# Patient Record
Sex: FEMALE | Race: WHITE | NOT HISPANIC OR LATINO | Employment: OTHER | URBAN - METROPOLITAN AREA
[De-identification: names, ages, dates, MRNs, and addresses within clinical notes are randomized per-mention and may not be internally consistent; named-entity substitution may affect disease eponyms.]

---

## 2017-02-20 PROBLEM — M76.829 POSTERIOR TIBIAL TENDON DYSFUNCTION: Status: ACTIVE | Noted: 2017-02-20

## 2019-08-27 ENCOUNTER — TELEPHONE (OUTPATIENT)
Dept: RHEUMATOLOGY | Facility: CLINIC | Age: 59
End: 2019-08-27

## 2019-08-27 NOTE — TELEPHONE ENCOUNTER
----- Message from Kavin Monson sent at 8/27/2019  2:09 PM CDT -----  Type: Needs Medical Advice    Who Called: Dr Genevieve Haywood office- [place]   Symptoms (please be specific):    How long has patient had these symptoms:    Pharmacy name and phone #:    Best Call Back Number: [phone] Additional Information: The office called asking if the doctor's office received the referral for the pt.

## 2019-11-14 ENCOUNTER — HOSPITAL ENCOUNTER (OUTPATIENT)
Dept: RADIOLOGY | Facility: HOSPITAL | Age: 59
Discharge: HOME | End: 2019-11-14
Attending: INTERNAL MEDICINE
Payer: MEDICAID

## 2019-11-14 ENCOUNTER — INITIAL CONSULT (OUTPATIENT)
Dept: RHEUMATOLOGY | Facility: CLINIC | Age: 59
End: 2019-11-14
Payer: MEDICAID

## 2019-11-14 VITALS
BODY MASS INDEX: 37.11 KG/M2 | SYSTOLIC BLOOD PRESSURE: 130 MMHG | HEART RATE: 71 BPM | WEIGHT: 209.44 LBS | DIASTOLIC BLOOD PRESSURE: 72 MMHG | HEIGHT: 63 IN

## 2019-11-14 DIAGNOSIS — R76.8 ANA POSITIVE: ICD-10-CM

## 2019-11-14 DIAGNOSIS — M17.0 PRIMARY OSTEOARTHRITIS OF BOTH KNEES: ICD-10-CM

## 2019-11-14 DIAGNOSIS — R76.8 ANA POSITIVE: Primary | ICD-10-CM

## 2019-11-14 DIAGNOSIS — M25.532 PAIN IN BOTH WRISTS: ICD-10-CM

## 2019-11-14 DIAGNOSIS — L65.9 ALOPECIA: ICD-10-CM

## 2019-11-14 DIAGNOSIS — M25.531 PAIN IN BOTH WRISTS: ICD-10-CM

## 2019-11-14 PROCEDURE — 99999 PR PBB SHADOW E&M-EST. PATIENT-LVL III: CPT | Mod: PBBFAC,,, | Performed by: INTERNAL MEDICINE

## 2019-11-14 PROCEDURE — 77077 JOINT SURVEY SINGLE VIEW: CPT | Mod: 26,,, | Performed by: RADIOLOGY

## 2019-11-14 PROCEDURE — 99205 OFFICE O/P NEW HI 60 MIN: CPT | Mod: 25,S$PBB,, | Performed by: INTERNAL MEDICINE

## 2019-11-14 PROCEDURE — 77077 XR ARTHRITIS SURVEY: ICD-10-PCS | Mod: 26,,, | Performed by: RADIOLOGY

## 2019-11-14 PROCEDURE — 99213 OFFICE O/P EST LOW 20 MIN: CPT | Mod: PBBFAC,25,PO | Performed by: INTERNAL MEDICINE

## 2019-11-14 PROCEDURE — 99999 PR PBB SHADOW E&M-EST. PATIENT-LVL III: ICD-10-PCS | Mod: PBBFAC,,, | Performed by: INTERNAL MEDICINE

## 2019-11-14 PROCEDURE — 96372 THER/PROPH/DIAG INJ SC/IM: CPT | Mod: PBBFAC,PO

## 2019-11-14 PROCEDURE — 99205 PR OFFICE/OUTPT VISIT, NEW, LEVL V, 60-74 MIN: ICD-10-PCS | Mod: 25,S$PBB,, | Performed by: INTERNAL MEDICINE

## 2019-11-14 PROCEDURE — 77077 JOINT SURVEY SINGLE VIEW: CPT | Mod: TC,PO

## 2019-11-14 RX ORDER — BUPROPION HYDROCHLORIDE 150 MG/1
TABLET, EXTENDED RELEASE ORAL
Refills: 1 | COMMUNITY
Start: 2019-10-17 | End: 2020-01-27

## 2019-11-14 RX ORDER — BLOOD SUGAR DIAGNOSTIC
STRIP MISCELLANEOUS
Refills: 2 | COMMUNITY
Start: 2019-10-25 | End: 2020-05-07

## 2019-11-14 RX ORDER — CYANOCOBALAMIN 1000 UG/ML
1000 INJECTION, SOLUTION INTRAMUSCULAR; SUBCUTANEOUS
Status: COMPLETED | OUTPATIENT
Start: 2019-11-14 | End: 2019-11-14

## 2019-11-14 RX ORDER — MEDICAL SUPPLY, MISCELLANEOUS
MISCELLANEOUS MISCELLANEOUS
COMMUNITY
Start: 2017-05-10

## 2019-11-14 RX ORDER — CELECOXIB 200 MG/1
200 CAPSULE ORAL DAILY
Qty: 30 CAPSULE | Refills: 3 | Status: SHIPPED | OUTPATIENT
Start: 2019-11-14 | End: 2019-12-14

## 2019-11-14 RX ORDER — FLUTICASONE PROPIONATE 50 MCG
2 SPRAY, SUSPENSION (ML) NASAL DAILY
Refills: 2 | COMMUNITY
Start: 2019-08-21

## 2019-11-14 RX ORDER — KETOROLAC TROMETHAMINE 30 MG/ML
60 INJECTION, SOLUTION INTRAMUSCULAR; INTRAVENOUS
Status: COMPLETED | OUTPATIENT
Start: 2019-11-14 | End: 2019-11-14

## 2019-11-14 RX ADMIN — KETOROLAC TROMETHAMINE 60 MG: 30 INJECTION, SOLUTION INTRAMUSCULAR at 11:11

## 2019-11-14 RX ADMIN — CYANOCOBALAMIN 1000 MCG: 1000 INJECTION, SOLUTION INTRAMUSCULAR at 11:11

## 2019-11-14 ASSESSMENT — ROUTINE ASSESSMENT OF PATIENT INDEX DATA (RAPID3)
FATIGUE SCORE: 1.1
PSYCHOLOGICAL DISTRESS SCORE: 0
PAIN SCORE: 6
TOTAL RAPID3 SCORE: 4.89
MDHAQ FUNCTION SCORE: 1.1
PATIENT GLOBAL ASSESSMENT SCORE: 5

## 2019-11-14 NOTE — LETTER
November 24, 2019      ERICK Peralta  [address] [place] [address]           [facility] [address] [place] [address]  Phone: [phone]  Fax: [phone]          Patient: Patricia Fournier   MR Number: [id number]   YOB: 1960   Date of Visit: 11/14/2019       Dear Genevieve Haywood:    Thank you for referring Patricia Fournier to me for evaluation. Attached you will find relevant portions of my assessment and plan of care.    If you have questions, please do not hesitate to call me. I look forward to following Patricia Fournier along with you.    Sincerely,    Murali Wilson MD    Enclosure  CC:  No Recipients    If you would like to receive this communication electronically, please contact [email or web address] or [phone] to request more information on [email or web address] access.    For providers and/or their staff who would like to refer a patient to [facility], please contact us through our one-stop-shop provider referral line, [facility] , at [phone].    If you feel you have received this communication in error or would no longer like to receive these types of communications, please e-mail [email or web address]

## 2019-11-14 NOTE — PROGRESS NOTES
Administered 1 cc ( 1000 mcg/ml ) of b12 to the right upper outer gluteal. Informed of s/s to report verbalized understanding. No adverse reactions noted.    Lot # [id number]  Expiration feb 21    Administered 2 cc ( 30 mg/ml ) of toradol to the left upper outer gluteal. Informed of s/s to report verbalized understanding. No adverse reactions noted.    Lot # [id number]  Expiration 07/2021

## 2019-11-14 NOTE — PROGRESS NOTES
Subjective:       Patient ID: Patricia Fournier is a 59 y.o. female.    Chief Complaint: Positive ROHIT     HPI:  60 yo female with a h/o positive rohit joint pain B ankles and  B wrist R> L,   She has neck and lower back pain, pt is  Type 2 DM poorly controlled.  She hair loss am gelling 60 min. Patient complains of arthralgias and myalgias for which has been present for a few years. Pain is located in multiple joints, both shoulder(s), both elbow(s), both wrist(s), both MCP(s): 1st, 2nd, 3rd, 4th and 5th, both PIP(s): 1st, 2nd, 3rd, 4th and 5th, both DIP(s): 1st and 2nd, both hip(s), both knee(s) and both MTP(s): 1st, 2nd, 3rd, 4th and 5th, is described as aching, pulsating, shooting and throbbing, and is constant, moderate .  Associated symptoms include: crepitation, decreased range of motion, edema, effusion, tenderness and warmth.  No family h/o RA or oa longevity in family.            She complains of pain, stiffness, joint swelling and joint warmth. Affected locations include the neck, right wrist, left wrist and left ankle. Associated symptoms include fatigue, pain at night, pain while resting, myalgias and dry eyes. Pertinent negatives include no dysuria, fever, trouble swallowing, pleurisy, headaches, stress or weight loss. She complains of morning stiffness lasting between 30 and 60 minutes after awakening.The right wrist, left wrist, left hand, right hand, left ankle and right ankle presents with Arthralgia.    Past treatments include NSAIDs. Factors aggravating her arthritis include activity.     Review of Systems   Constitutional: Positive for fatigue. Negative for activity change, appetite change, chills, diaphoresis, fever, unexpected weight change and weight loss.   HENT: Negative for congestion, dental problem, ear discharge, ear pain, facial swelling, mouth sores, nosebleeds, postnasal drip, rhinorrhea, sinus pressure, sneezing, sore throat, tinnitus, trouble swallowing and voice change.    Eyes: Negative  "for photophobia, pain, discharge, redness and itching.   Respiratory: Negative for apnea, cough, chest tightness, shortness of breath and wheezing.    Cardiovascular: Positive for leg swelling. Negative for chest pain and palpitations.   Gastrointestinal: Negative for abdominal distention, abdominal pain, constipation, diarrhea, nausea and vomiting.   Endocrine: Negative for cold intolerance, heat intolerance, polydipsia and polyuria.   Genitourinary: Negative for decreased urine volume, difficulty urinating, dysuria, flank pain, frequency, hematuria and urgency.   Musculoskeletal: Positive for arthralgias, back pain, gait problem, joint swelling, myalgias, neck pain, neck stiffness and stiffness.   Skin: Negative for pallor, rash and wound.   Allergic/Immunologic: Negative for immunocompromised state.   Neurological: Negative for dizziness, tremors, weakness, numbness and headaches.   Hematological: Negative for adenopathy. Does not bruise/bleed easily.   Psychiatric/Behavioral: Negative for sleep disturbance. The patient is not nervous/anxious.          Objective:   /72 (BP Location: Left arm, Patient Position: Sitting, BP Method: Medium (Automatic))   Pulse 71   Ht 5' 3" (1.6 m)   Wt 95 kg (209 lb 7 oz)   BMI 37.10 kg/m²      Physical Exam   Vitals reviewed.  Constitutional: She is oriented to person, place, and time. No distress.   HENT:   Head: Normocephalic and atraumatic.   Eyes: EOM are normal. Pupils are equal, round, and reactive to light. Right eye exhibits no discharge. Left eye exhibits no discharge.   Neck: Neck supple. No thyromegaly present.   Cardiovascular: Normal rate, regular rhythm and normal heart sounds.  Exam reveals no gallop and no friction rub.    No murmur heard.  Pulmonary/Chest: Breath sounds normal. She has no wheezes. She has no rales. She exhibits no tenderness.   Abdominal: There is no tenderness. There is no rebound and no guarding.       Right Side Rheumatological Exam "     Examination finds the elbow normal.    The patient is tender to palpation of the shoulder, wrist, knee, 1st PIP, 1st MCP, 2nd PIP, 2nd MCP, 3rd PIP, 3rd MCP, 4th PIP, 4th MCP, 5th PIP and 5th MCP    She has swelling of the 1st PIP, 1st MCP, 2nd PIP, 2nd MCP, 3rd PIP, 3rd MCP, 4th PIP, 4th MCP, 5th PIP and 5th MCP    Shoulder Exam   Tenderness Location: no tenderness    Range of Motion   Active abduction: abnormal   Adduction: abnormal  Sensation: normal    Knee Exam   Patellofemoral Crepitus: positive  Effusion: positive  Sensation: normal    Hip Exam   Tenderness Location: posterior  Sensation: normal    Elbow/Wrist Exam   Tenderness Location: no tenderness  Sensation: normal    Left Side Rheumatological Exam     The patient is tender to palpation of the shoulder, elbow, wrist, knee, 1st PIP, 1st MCP, 2nd PIP, 2nd MCP, 3rd PIP, 3rd MCP, 4th PIP, 4th MCP, 5th PIP and 5th MCP.    She has swelling of the 1st PIP, 1st MCP, 2nd PIP, 2nd MCP, 3rd PIP, 3rd MCP, 4th PIP, 4th MCP, 5th PIP and 5th MCP    Shoulder Exam   Tenderness Location: no tenderness    Range of Motion   Active abduction: abnormal   Sensation: normal    Knee Exam     Patellofemoral Crepitus: positive  Effusion: positive  Sensation: normal    Hip Exam   Tenderness Location: posterior  Sensation: normal    Elbow/Wrist Exam   Sensation: normal      Back/Neck Exam   General Inspection   Gait: normal         Lymphadenopathy:     She has no cervical adenopathy.   Neurological: She is alert and oriented to person, place, and time. Gait normal.   Skin: Skin is dry. No rash noted. No erythema. There is pallor.     Psychiatric: Mood and affect normal.   Musculoskeletal: She exhibits tenderness and deformity.        HEMOGLOBIN A1C (09/26/2019 1:16 PM CDT)  HEMOGLOBIN A1C (09/26/2019 1:16 PM CDT)   Component Value Ref Range Performed At Pathologist Signature   HEMOGLOBIN A1C 9.0 (H) 4.7 - 5.6 % [facility]     EST. AVG GLUCOSE, A1C 212 (H) <=115  mg/dL [facility]       HEMOGLOBIN A1C (09/26/2019 1:16 PM CDT)   Specimen   Blood     HEMOGLOBIN A1C (09/26/2019 1:16 PM CDT)   Narrative Performed At   HGB A1C INTERPRETATION    NORMAL:       <5.7%    PRE-DIABETES: 5.7 - 6.4%    DIABETES:     6.5% OR GREATER        Reference Ranges HgbA1C    4.7%-5.6% Normal     5.7%-6.4% Increased Risk for Diabetes    >6.4% (Confirmed) Diagnostic of Diabetes    <7.0%  Adult Glycemic Control Target    [facility]       HEMOGLOBIN A1C (09/26/2019 1:16 PM CDT)   Performing Organization Address City/[facility]/Zipcode Phone Number   [facility]   CLIA #[id number], [address] [place] [address]      Back to top of Lab Results       C-PEPTIDE (09/26/2019 1:16 PM CDT)  C-PEPTIDE (09/26/2019 1:16 PM CDT)   Component Value Ref Range Performed At Pathologist Signature   C-PEPTIDE 4.3  Comment:   Test Performed by:  [facility]  [address] [place] [address]  : Elvis Morris M.D. Ph.D.; CLIA# [id number] 1.1 - 4.4 ng/mL [facility]       C-PEPTIDE (09/26/2019 1:16 PM CDT)   Specimen   Blood     C-PEPTIDE (09/26/2019 1:16 PM CDT)   Performing Organization Address City/[facility] Phone Number   [facility]        [phone]     [facility]             VITAMIN D 25 HYDROXY (08/21/2019 10:35 AM CDT)  VITAMIN D 25 HYDROXY (08/21/2019 10:35 AM CDT)   Component Value Ref Range Performed At Pathologist Signature   VITAMIN D TOTAL (25OH) 30 30 - 100 ng/mL [facility]       VITAMIN D 25 HYDROXY (08/21/2019 10:35 AM CDT)   Specimen   Blood     VITAMIN D 25 HYDROXY (08/21/2019 10:35 AM CDT)   Narrative Performed At   Interpretive Data Chart:        Deficient:                         0 - 20 ng/mL    Insufficient:                      21 - 29 ng/mL    Sufficient:                        30 - 100 ng/mL     Increased Risk of Hypercalciuria:  >100 ng/ml    Toxic:                             >150 ng/ml                                       [facility]       VITAMIN D 25 HYDROXY (08/21/2019 10:35 AM CDT)   Performing Organization Address City/State/Zipcode Phone Number   [facility]   CLIA #[id number], [address] [place] [address]      Back to top of Lab Results       CONNECTIVE TISSUE DISEASE CASCADE (08/21/2019 10:35 AM CDT)  CONNECTIVE TISSUE DISEASE CASCADE (08/21/2019 10:35 AM CDT)   Component Value Ref Range Performed At Pathologist Signature   GRACIELA 0.5 <=1.0 (Negative) U [facility]     CYCLIC CITRULLINATED PEPTIDE AB IGG <15.6 <20.0 (Negative) U [facility]     CONNECTIVE TISSUE DISEASE INTERP SEE COMMENTS  Comment:   Tests for antibodies to dsDNA and ANAIS antigens are not  performed automatically unless the GRACIELA result is > or =  3.0 U.  Studies performed at [facility] indicate that   positive GRACIELA results <3.0 U are rarely accompanied by   positive second order tests.  Test Performed by:  [facility]  [address]       CONNECTIVE TISSUE DISEASE CASCADE (08/21/2019 10:35 AM CDT)   Specimen   Blood     CONNECTIVE TISSUE DISEASE CASCADE (08/21/2019 10:35 AM CDT)   Performing Organization Address City/State/Zipcode Phone Number   [facility]        [phone]     [facility]             Back to top of Lab Results       SEDIMENTATION RATE (08/21/2019 10:35 AM CDT)  SEDIMENTATION RATE (08/21/2019 10:35 AM CDT)   Component Value Ref Range Performed At Pathologist Signature   ESR (SEDIMENTATION RATE) 26 <40 mm/Hr [facility]       SEDIMENTATION RATE (08/21/2019 10:35 AM CDT)   Specimen   Blood     SEDIMENTATION RATE (08/21/2019 10:35 AM CDT)   Performing Organization Address  City/State/Zipcode Phone Number   [facility]   CLIA #[id number], [address] [place] [address]      Back to top of Lab Results       RHEUMATOID FACTOR (08/21/2019 10:35 AM CDT)  RHEUMATOID FACTOR (08/21/2019 10:35 AM CDT)   Component Value Ref Range Performed At Pathologist Signature   RHEUMATOID FACTOR <15  Comment:   Perform at [facility]  [address] [facility] [place] [address]  [phone] - 30 IU/mL [facility]       RHEUMATOID FACTOR (08/21/2019 10:35 AM CDT)   Specimen   Blood     RHEUMATOID FACTOR (08/21/2019 10:35 AM CDT)   Performing Organization Address City/[facility] Phone [facility]   [facility]             Back to top of Lab Results       ROHIT SCREEN W/REFLEX (08/21/2019 10:35 AM CDT)  ROHIT SCREEN W/REFLEX (08/21/2019 10:35 AM CDT)   Component Value Ref Range Performed At Pathologist Signature   ROHIT Positive 1:320 (A) <1:80 (Negative) [facility]     ROHIT TITER 1:320   [facility]     ROHIT PATTERN Homogeneous  Comment:   Test Performed by:  [facility]  [address]       ROHIT SCREEN W/REFLEX (08/21/2019 10:35 AM CDT)   Specimen   Blood     ROHIT SCREEN W/REFLEX (08/21/2019 10:35 AM CDT)   Performing Organization Address City/[facility] Phone Number   [facility]        [phone]     [facility]             Back to top of Lab Results  Back to top of Lab Results     reviewed labs with patient during this visit   Assessment:       1. ROHIT positive    2. Alopecia    3. Primary osteoarthritis of both knees    4. Pain in both wrists            Plan:       Patricia was seen today for positive rohit.    Diagnoses and all orders for this visit:    ROHIT positive  -     ROHIT Screen w/Reflex; Future  -     Anti Sm/RNP Antibody; Future  -     Anti-DNA antibody,  double-stranded; Future  -     Anti-Histone Antibody; Future  -     Antimitochondrial antibody; Future  -     Anti-scleroderma antibody; Future  -     Anti-Smith antibody; Future  -     Anti-smooth muscle antibody; Future  -     Anti-thyroglobulin antibody; Future  -     CBC auto differential; Future  -     Comprehensive metabolic panel; Future  -     C-reactive protein; Future  -     Sjogrens syndrome-B extractable nuclear antibody; Future  -     Sjogrens syndrome-A extractable nuclear antibody; Future  -     Sedimentation rate; Future  -     Cyclic citrul peptide antibody, IgG; Future  -     PTH, intact; Future  -     TSH; Future  -     Thyroid peroxidase antibody; Future  -     T4, free; Future  -     T3, free; Future  -     Hemoglobin A1c; Future  -     Insulin, random; Future  -     celecoxib (CELEBREX) 200 MG capsule; Take 1 capsule (200 mg total) by mouth once daily.  -     Ambulatory referral/consult to Endocrinology; Future  -     XR Arthritis Survey; Future  -     ketorolac injection 60 mg    Alopecia  -     GRACIELA Screen w/Reflex; Future  -     Anti Sm/RNP Antibody; Future  -     Anti-DNA antibody, double-stranded; Future  -     Anti-Histone Antibody; Future  -     Antimitochondrial antibody; Future  -     Anti-scleroderma antibody; Future  -     Anti-Smith antibody; Future  -     Anti-smooth muscle antibody; Future  -     Anti-thyroglobulin antibody; Future  -     CBC auto differential; Future  -     Comprehensive metabolic panel; Future  -     C-reactive protein; Future  -     Sjogrens syndrome-B extractable nuclear antibody; Future  -     Sjogrens syndrome-A extractable nuclear antibody; Future  -     Sedimentation rate; Future  -     Cyclic citrul peptide antibody, IgG; Future  -     PTH, intact; Future  -     TSH; Future  -     Thyroid peroxidase antibody; Future  -     T4, free; Future  -     T3, free; Future  -     Hemoglobin A1c; Future  -     Insulin, random; Future  -     celecoxib (CELEBREX) 200  MG capsule; Take 1 capsule (200 mg total) by mouth once daily.  -     Ambulatory referral/consult to Endocrinology; Future  -     XR Arthritis Survey; Future  -     ketorolac injection 60 mg    Primary osteoarthritis of both knees  -     GRACIELA Screen w/Reflex; Future  -     Anti Sm/RNP Antibody; Future  -     Anti-DNA antibody, double-stranded; Future  -     Anti-Histone Antibody; Future  -     Antimitochondrial antibody; Future  -     Anti-scleroderma antibody; Future  -     Anti-Smith antibody; Future  -     Anti-smooth muscle antibody; Future  -     Anti-thyroglobulin antibody; Future  -     CBC auto differential; Future  -     Comprehensive metabolic panel; Future  -     C-reactive protein; Future  -     Sjogrens syndrome-B extractable nuclear antibody; Future  -     Sjogrens syndrome-A extractable nuclear antibody; Future  -     Sedimentation rate; Future  -     Cyclic citrul peptide antibody, IgG; Future  -     PTH, intact; Future  -     TSH; Future  -     Thyroid peroxidase antibody; Future  -     T4, free; Future  -     T3, free; Future  -     Hemoglobin A1c; Future  -     Insulin, random; Future  -     celecoxib (CELEBREX) 200 MG capsule; Take 1 capsule (200 mg total) by mouth once daily.  -     Ambulatory referral/consult to Endocrinology; Future  -     XR Arthritis Survey; Future  -     ketorolac injection 60 mg    Pain in both wrists  -     XR Arthritis Survey; Future    Other orders  -     cyanocobalamin injection 1,000 mcg    will discuss with opthof plaquenil is an option

## 2019-12-06 ENCOUNTER — TELEPHONE (OUTPATIENT)
Dept: RHEUMATOLOGY | Facility: CLINIC | Age: 59
End: 2019-12-06

## 2019-12-06 NOTE — TELEPHONE ENCOUNTER
----- Message from Gale Louise sent at 12/6/2019 10:36 AM CST -----  Contact: self  Type:  Test Results    Who Called:  self  Name of Test (Lab/Mammo/Etc):  Labs and Xrays  Date of Test:  11/14/19  Ordering Provider:  Dr Wilson  Where the test was performed:  [facility]  Best Call Back Number:  [phone] (work)  Additional Information:  Patient is still waiting on the results. Please call patient. Thanks!

## 2020-01-27 ENCOUNTER — OFFICE VISIT (OUTPATIENT)
Dept: RHEUMATOLOGY | Facility: CLINIC | Age: 60
End: 2020-01-27
Payer: MEDICAID

## 2020-01-27 VITALS
SYSTOLIC BLOOD PRESSURE: 137 MMHG | RESPIRATION RATE: 18 BRPM | DIASTOLIC BLOOD PRESSURE: 76 MMHG | WEIGHT: 219.25 LBS | HEART RATE: 77 BPM | BODY MASS INDEX: 38.85 KG/M2 | HEIGHT: 63 IN

## 2020-01-27 DIAGNOSIS — M35.00 SJOGREN'S SYNDROME, WITH UNSPECIFIED ORGAN INVOLVEMENT: ICD-10-CM

## 2020-01-27 DIAGNOSIS — M25.531 PAIN IN BOTH WRISTS: ICD-10-CM

## 2020-01-27 DIAGNOSIS — M25.532 PAIN IN BOTH WRISTS: ICD-10-CM

## 2020-01-27 DIAGNOSIS — M06.00 SERONEGATIVE RHEUMATOID ARTHRITIS: Primary | ICD-10-CM

## 2020-01-27 DIAGNOSIS — L65.9 ALOPECIA: ICD-10-CM

## 2020-01-27 DIAGNOSIS — R76.8 ANA POSITIVE: ICD-10-CM

## 2020-01-27 PROCEDURE — 99999 PR PBB SHADOW E&M-EST. PATIENT-LVL IV: ICD-10-PCS | Mod: PBBFAC,,, | Performed by: INTERNAL MEDICINE

## 2020-01-27 PROCEDURE — 99214 PR OFFICE/OUTPT VISIT, EST, LEVL IV, 30-39 MIN: ICD-10-PCS | Mod: S$PBB,,, | Performed by: INTERNAL MEDICINE

## 2020-01-27 PROCEDURE — 99999 PR PBB SHADOW E&M-EST. PATIENT-LVL IV: CPT | Mod: PBBFAC,,, | Performed by: INTERNAL MEDICINE

## 2020-01-27 PROCEDURE — 99214 OFFICE O/P EST MOD 30 MIN: CPT | Mod: S$PBB,,, | Performed by: INTERNAL MEDICINE

## 2020-01-27 PROCEDURE — 99214 OFFICE O/P EST MOD 30 MIN: CPT | Mod: PBBFAC,PO | Performed by: INTERNAL MEDICINE

## 2020-01-27 RX ORDER — HYDROXYCHLOROQUINE SULFATE 200 MG/1
200 TABLET, FILM COATED ORAL 2 TIMES DAILY
Qty: 60 TABLET | Refills: 6 | Status: SHIPPED | OUTPATIENT
Start: 2020-01-27 | End: 2020-02-26

## 2020-01-27 RX ORDER — CELECOXIB 200 MG/1
200 CAPSULE ORAL DAILY
Qty: 90 CAPSULE | Refills: 2 | Status: SHIPPED | OUTPATIENT
Start: 2020-01-27 | End: 2020-01-27 | Stop reason: SDUPTHER

## 2020-01-27 RX ORDER — CELECOXIB 200 MG/1
200 CAPSULE ORAL DAILY
Qty: 90 CAPSULE | Refills: 2 | Status: SHIPPED | OUTPATIENT
Start: 2020-01-27 | End: 2020-04-16

## 2020-01-27 ASSESSMENT — ROUTINE ASSESSMENT OF PATIENT INDEX DATA (RAPID3)
PAIN SCORE: 5
MDHAQ FUNCTION SCORE: .9
PSYCHOLOGICAL DISTRESS SCORE: 1.1
PATIENT GLOBAL ASSESSMENT SCORE: 8
TOTAL RAPID3 SCORE: 5.33

## 2020-01-27 NOTE — PROGRESS NOTES
Subjective:       Patient ID: Patricia Fournier is a 59 y.o. female.    Chief Complaint: Disease Management    Follow up: 60 yo female  rohit joint pain B ankles and  B wrist R> L,  She has neck and lower back pain, pt is  Type 2 DM poorly controlled she still has her pancrease is working..  She hair loss am gelling 60 min. Patient complains of arthralgias and myalgias for which has been present for a few years. Pain is located in multiple joints, both shoulder(s), both elbow(s), both wrist(s), both MCP(s): 1st, 2nd, 3rd, 4th and 5th, both PIP(s): 1st, 2nd, 3rd, 4th and 5th, both DIP(s): 1st and 2nd, both hip(s), both knee(s) and both MTP(s): 1st, 2nd, 3rd, 4th and 5th, is described as aching, pulsating, shooting and throbbing, and is constant, moderate .      Review of Systems   Constitutional: Negative for activity change, appetite change, chills, diaphoresis and unexpected weight change.   HENT: Negative for congestion, dental problem, ear discharge, ear pain, facial swelling, mouth sores, nosebleeds, postnasal drip, rhinorrhea, sinus pressure, sneezing, sore throat, tinnitus and voice change.    Eyes: Negative for photophobia, pain, discharge, redness and itching.   Respiratory: Negative for apnea, cough, chest tightness, shortness of breath and wheezing.    Cardiovascular: Positive for leg swelling. Negative for chest pain and palpitations.   Gastrointestinal: Negative for abdominal distention, abdominal pain, constipation, diarrhea, nausea and vomiting.   Endocrine: Negative for cold intolerance, heat intolerance, polydipsia and polyuria.   Genitourinary: Negative for decreased urine volume, difficulty urinating, flank pain, frequency, hematuria and urgency.   Musculoskeletal: Positive for arthralgias, back pain, gait problem, joint swelling, myalgias, neck pain and neck stiffness.   Skin: Negative for pallor, rash and wound.   Allergic/Immunologic: Negative for immunocompromised state.   Neurological: Negative for  "dizziness, tremors, weakness and numbness.   Hematological: Negative for adenopathy. Does not bruise/bleed easily.   Psychiatric/Behavioral: Negative for sleep disturbance. The patient is not nervous/anxious.          Objective:   /76   Pulse 77   Resp 18   Ht 5' 3" (1.6 m)   Wt 99.5 kg (219 lb 4 oz)   BMI 38.84 kg/m²      Physical Exam   Vitals reviewed.  Constitutional: She is oriented to person, place, and time. No distress.   HENT:   Head: Normocephalic and atraumatic.   Eyes: EOM are normal. Pupils are equal, round, and reactive to light. Right eye exhibits no discharge. Left eye exhibits no discharge.   Neck: Neck supple. No thyromegaly present.   Cardiovascular: Normal rate, regular rhythm and normal heart sounds.  Exam reveals no gallop and no friction rub.    No murmur heard.  Pulmonary/Chest: Breath sounds normal. She has no wheezes. She has no rales. She exhibits no tenderness.   Abdominal: There is no tenderness. There is no rebound and no guarding.       Right Side Rheumatological Exam     Examination finds the elbow normal.    The patient is tender to palpation of the shoulder, wrist, knee, 1st PIP, 1st MCP, 2nd PIP, 2nd MCP, 3rd PIP, 3rd MCP, 4th PIP, 4th MCP, 5th PIP and 5th MCP    She has swelling of the 1st PIP, 1st MCP, 2nd PIP, 2nd MCP, 3rd PIP, 3rd MCP, 4th PIP, 4th MCP, 5th PIP and 5th MCP    Shoulder Exam   Tenderness Location: no tenderness    Range of Motion   Active abduction: abnormal   Adduction: abnormal  Sensation: normal    Knee Exam   Patellofemoral Crepitus: positive  Effusion: positive  Sensation: normal    Hip Exam   Tenderness Location: posterior  Sensation: normal    Elbow/Wrist Exam   Tenderness Location: no tenderness  Sensation: normal    Left Side Rheumatological Exam     The patient is tender to palpation of the shoulder, elbow, wrist, knee, 1st PIP, 1st MCP, 2nd PIP, 2nd MCP, 3rd PIP, 3rd MCP, 4th PIP, 4th MCP, 5th PIP and 5th MCP.    She has swelling of the 1st " PIP, 1st MCP, 2nd PIP, 2nd MCP, 3rd PIP, 3rd MCP, 4th PIP, 4th MCP, 5th PIP and 5th MCP    Shoulder Exam   Tenderness Location: no tenderness    Range of Motion   Active abduction: abnormal   Sensation: normal    Knee Exam     Patellofemoral Crepitus: positive  Effusion: positive  Sensation: normal    Hip Exam   Tenderness Location: posterior  Sensation: normal    Elbow/Wrist Exam   Sensation: normal      Back/Neck Exam   General Inspection   Gait: normal         Lymphadenopathy:     She has no cervical adenopathy.   Neurological: She is alert and oriented to person, place, and time. Gait normal.   Skin: Skin is dry. No rash noted. No erythema. There is pallor.     Psychiatric: Mood and affect normal.   Musculoskeletal: She exhibits tenderness and deformity.        Results for orders placed or performed in visit on 11/14/19   GRACIELA Screen w/Reflex   Result Value Ref Range    GRACIELA Screen Positive (A) Negative <1:160   Anti Sm/RNP Antibody   Result Value Ref Range    Anti Sm/RNP Antibody 2.50 0.00 - 19.99 EU    Anti-Sm/RNP Interpretation Negative Negative   Anti-DNA antibody, double-stranded   Result Value Ref Range    ds DNA Ab Negative 1:10 Negative 1:10   Anti-Histone Antibody   Result Value Ref Range    Anti-Histone Antibody 0.4 0.0 - 0.9 Units   Antimitochondrial antibody   Result Value Ref Range    Anti-Mitochon Ab IFA Negative 1:40 Negative   Anti-scleroderma antibody   Result Value Ref Range    Scleroderma SCL- 5 <20 UNITS   Anti-Smith antibody   Result Value Ref Range    Anti Sm Antibody 2.19 0.00 - 19.99 EU    Anti-Sm Interpretation Negative Negative   Anti-smooth muscle antibody   Result Value Ref Range    Smooth Muscle Ab Negative 1:40 Negative   Anti-thyroglobulin antibody   Result Value Ref Range    Thyroglobulin Ab Screen <4.0 0.0 - 3.9 IU/mL   CBC auto differential   Result Value Ref Range    WBC 4.11 3.90 - 12.70 K/uL    RBC 4.19 4.00 - 5.40 M/uL    Hemoglobin 11.3 (L) 12.0 - 16.0 g/dL    Hematocrit 36.8  (L) 37.0 - 48.5 %    Mean Corpuscular Volume 88 82 - 98 fL    Mean Corpuscular Hemoglobin 27.0 27.0 - 31.0 pg    Mean Corpuscular Hemoglobin Conc 30.7 (L) 32.0 - 36.0 g/dL    RDW 13.8 11.5 - 14.5 %    Platelets 238 150 - 350 K/uL    MPV 11.2 9.2 - 12.9 fL    Immature Granulocytes 0.2 0.0 - 0.5 %    Gran # (ANC) 2.1 1.8 - 7.7 K/uL    Immature Grans (Abs) 0.01 0.00 - 0.04 K/uL    Lymph # 1.4 1.0 - 4.8 K/uL    Mono # 0.3 0.3 - 1.0 K/uL    Eos # 0.3 0.0 - 0.5 K/uL    Baso # 0.03 0.00 - 0.20 K/uL    nRBC 0 0 /100 WBC    Gran% 50.7 38.0 - 73.0 %    Lymph% 34.5 18.0 - 48.0 %    Mono% 7.1 4.0 - 15.0 %    Eosinophil% 6.8 0.0 - 8.0 %    Basophil% 0.7 0.0 - 1.9 %    Differential Method Automated    Comprehensive metabolic panel   Result Value Ref Range    Sodium 139 136 - 145 mmol/L    Potassium 4.4 3.5 - 5.1 mmol/L    Chloride 104 95 - 110 mmol/L    CO2 26 23 - 29 mmol/L    Glucose 166 (H) 70 - 110 mg/dL    BUN, Bld 21 8 - 23 mg/dL    Creatinine 1.0 0.5 - 1.4 mg/dL    Calcium 10.0 8.7 - 10.5 mg/dL    Total Protein 7.9 6.0 - 8.4 g/dL    Albumin 4.2 3.5 - 5.2 g/dL    Total Bilirubin 0.3 0.1 - 1.0 mg/dL    Alkaline Phosphatase 58 55 - 135 U/L    AST 13 10 - 40 U/L    ALT 16 10 - 44 U/L    Anion Gap 9 8 - 16 mmol/L    eGFR if African American >60.0 >60 mL/min/1.73 m^2    eGFR if non African American >60.0 >60 mL/min/1.73 m^2   C-reactive protein   Result Value Ref Range    CRP 1.4 0.0 - 8.2 mg/L   Sjogrens syndrome-B extractable nuclear antibody   Result Value Ref Range    Anti-SSB Antibody 2.78 0.00 - 19.99 EU    Anti-SSB Interpretation Negative Negative   Sjogrens syndrome-A extractable nuclear antibody   Result Value Ref Range    Anti-SSA Antibody 1.17 0.00 - 19.99 EU    Anti-SSA Interpretation Negative Negative   Sedimentation rate   Result Value Ref Range    Sed Rate 18 0 - 20 mm/Hr   Cyclic citrul peptide antibody, IgG   Result Value Ref Range    CCP Antibodies <0.5 <5.0 U/mL   PTH, intact   Result Value Ref Range    PTH,  Intact 48.0 9.0 - 77.0 pg/mL   TSH   Result Value Ref Range    TSH 2.382 0.400 - 4.000 uIU/mL   Thyroid peroxidase antibody   Result Value Ref Range    Thyroperoxidase Antibodies <6.0 <6.0 IU/mL   T4, free   Result Value Ref Range    Free T4 0.91 0.71 - 1.51 ng/dL   T3, free   Result Value Ref Range    T3, Free 2.8 2.3 - 4.2 pg/mL   Hemoglobin A1c   Result Value Ref Range    Hemoglobin A1C 8.7 (H) 4.0 - 5.6 %    Estimated Avg Glucose 203 (H) 68 - 131 mg/dL   Insulin, random   Result Value Ref Range    Insulin 37.5 (H) <25.0 uU/mL    Insulin Collection Interval fasting    GRACIELA Titer   Result Value Ref Range    GRACIELA HEP-2 Titer Positive 1:160 Speckled       reviewed labs with patient during this visit   Assessment:       1. Seronegative rheumatoid arthritis    2. GRACIELA positive    3. Alopecia    4. Pain in both wrists    5. Sjogren's syndrome, with unspecified organ involvement            Plan:       Patricia was seen today for disease management.    Diagnoses and all orders for this visit:    Seronegative rheumatoid arthritis  -     hydroxychloroquine (PLAQUENIL) 200 mg tablet; Take 1 tablet (200 mg total) by mouth 2 (two) times daily. for 60 doses  -     CBC auto differential; Future  -     Comprehensive metabolic panel; Future  -     C-reactive protein; Future  -     Sedimentation rate; Future  -     GRACIELA Screen w/Reflex; Future  -     Vitamin D; Future  -     Discontinue: celecoxib (CELEBREX) 200 MG capsule; Take 1 capsule (200 mg total) by mouth once daily.  -     Discontinue: celecoxib (CELEBREX) 200 MG capsule; Take 1 capsule (200 mg total) by mouth once daily.  -     Discontinue: celecoxib (CELEBREX) 200 MG capsule; Take 1 capsule (200 mg total) by mouth once daily.  -     celecoxib (CELEBREX) 200 MG capsule; Take 1 capsule (200 mg total) by mouth once daily.    GRACIELA positive  -     hydroxychloroquine (PLAQUENIL) 200 mg tablet; Take 1 tablet (200 mg total) by mouth 2 (two) times daily. for 60 doses  -     CBC auto  differential; Future  -     Comprehensive metabolic panel; Future  -     C-reactive protein; Future  -     Sedimentation rate; Future  -     GRACIELA Screen w/Reflex; Future  -     Vitamin D; Future  -     Discontinue: celecoxib (CELEBREX) 200 MG capsule; Take 1 capsule (200 mg total) by mouth once daily.  -     Discontinue: celecoxib (CELEBREX) 200 MG capsule; Take 1 capsule (200 mg total) by mouth once daily.  -     Discontinue: celecoxib (CELEBREX) 200 MG capsule; Take 1 capsule (200 mg total) by mouth once daily.  -     celecoxib (CELEBREX) 200 MG capsule; Take 1 capsule (200 mg total) by mouth once daily.    Alopecia  -     hydroxychloroquine (PLAQUENIL) 200 mg tablet; Take 1 tablet (200 mg total) by mouth 2 (two) times daily. for 60 doses  -     CBC auto differential; Future  -     Comprehensive metabolic panel; Future  -     C-reactive protein; Future  -     Sedimentation rate; Future  -     GRACIELA Screen w/Reflex; Future  -     Vitamin D; Future  -     Discontinue: celecoxib (CELEBREX) 200 MG capsule; Take 1 capsule (200 mg total) by mouth once daily.  -     Discontinue: celecoxib (CELEBREX) 200 MG capsule; Take 1 capsule (200 mg total) by mouth once daily.  -     Discontinue: celecoxib (CELEBREX) 200 MG capsule; Take 1 capsule (200 mg total) by mouth once daily.  -     celecoxib (CELEBREX) 200 MG capsule; Take 1 capsule (200 mg total) by mouth once daily.    Pain in both wrists  -     hydroxychloroquine (PLAQUENIL) 200 mg tablet; Take 1 tablet (200 mg total) by mouth 2 (two) times daily. for 60 doses  -     CBC auto differential; Future  -     Comprehensive metabolic panel; Future  -     C-reactive protein; Future  -     Sedimentation rate; Future  -     GRACIELA Screen w/Reflex; Future  -     Vitamin D; Future  -     Discontinue: celecoxib (CELEBREX) 200 MG capsule; Take 1 capsule (200 mg total) by mouth once daily.  -     Discontinue: celecoxib (CELEBREX) 200 MG capsule; Take 1 capsule (200 mg total) by mouth once  daily.  -     Discontinue: celecoxib (CELEBREX) 200 MG capsule; Take 1 capsule (200 mg total) by mouth once daily.  -     celecoxib (CELEBREX) 200 MG capsule; Take 1 capsule (200 mg total) by mouth once daily.    Sjogren's syndrome, with unspecified organ involvement  -     hydroxychloroquine (PLAQUENIL) 200 mg tablet; Take 1 tablet (200 mg total) by mouth 2 (two) times daily. for 60 doses  -     CBC auto differential; Future  -     Comprehensive metabolic panel; Future  -     C-reactive protein; Future  -     Sedimentation rate; Future  -     GRACIELA Screen w/Reflex; Future  -     Vitamin D; Future  -     Discontinue: celecoxib (CELEBREX) 200 MG capsule; Take 1 capsule (200 mg total) by mouth once daily.  -     Discontinue: celecoxib (CELEBREX) 200 MG capsule; Take 1 capsule (200 mg total) by mouth once daily.  -     Discontinue: celecoxib (CELEBREX) 200 MG capsule; Take 1 capsule (200 mg total) by mouth once daily.  -     celecoxib (CELEBREX) 200 MG capsule; Take 1 capsule (200 mg total) by mouth once daily.      We will start plaqueni keep celebrex she will work on diet

## 2020-04-13 ENCOUNTER — LAB VISIT (OUTPATIENT)
Dept: LAB | Facility: HOSPITAL | Age: 60
End: 2020-04-13
Attending: INTERNAL MEDICINE
Payer: MEDICAID

## 2020-04-13 DIAGNOSIS — M25.531 PAIN IN BOTH WRISTS: ICD-10-CM

## 2020-04-13 DIAGNOSIS — M06.00 SERONEGATIVE RHEUMATOID ARTHRITIS: ICD-10-CM

## 2020-04-13 DIAGNOSIS — M25.532 PAIN IN BOTH WRISTS: ICD-10-CM

## 2020-04-13 DIAGNOSIS — L65.9 ALOPECIA: ICD-10-CM

## 2020-04-13 DIAGNOSIS — R76.8 ANA POSITIVE: ICD-10-CM

## 2020-04-13 DIAGNOSIS — M35.00 SJOGREN'S SYNDROME, WITH UNSPECIFIED ORGAN INVOLVEMENT: ICD-10-CM

## 2020-04-13 LAB
25(OH)D3+25(OH)D2 SERPL-MCNC: 31 NG/ML (ref 30–96)
ALBUMIN SERPL BCP-MCNC: 3.7 G/DL (ref 3.5–5.2)
ALP SERPL-CCNC: 54 U/L (ref 55–135)
ALT SERPL W/O P-5'-P-CCNC: 22 U/L (ref 10–44)
ANION GAP SERPL CALC-SCNC: 9 MMOL/L (ref 8–16)
AST SERPL-CCNC: 16 U/L (ref 10–40)
BASOPHILS # BLD AUTO: 0.03 K/UL (ref 0–0.2)
BASOPHILS NFR BLD: 0.6 % (ref 0–1.9)
BILIRUB SERPL-MCNC: 0.3 MG/DL (ref 0.1–1)
BUN SERPL-MCNC: 24 MG/DL (ref 8–23)
CALCIUM SERPL-MCNC: 9.5 MG/DL (ref 8.7–10.5)
CHLORIDE SERPL-SCNC: 100 MMOL/L (ref 95–110)
CO2 SERPL-SCNC: 28 MMOL/L (ref 23–29)
CREAT SERPL-MCNC: 1.1 MG/DL (ref 0.5–1.4)
CRP SERPL-MCNC: 1.1 MG/L (ref 0–8.2)
DIFFERENTIAL METHOD: NORMAL
EOSINOPHIL # BLD AUTO: 0.4 K/UL (ref 0–0.5)
EOSINOPHIL NFR BLD: 6.9 % (ref 0–8)
ERYTHROCYTE [DISTWIDTH] IN BLOOD BY AUTOMATED COUNT: 12.7 % (ref 11.5–14.5)
ERYTHROCYTE [SEDIMENTATION RATE] IN BLOOD BY WESTERGREN METHOD: 8 MM/HR (ref 0–20)
EST. GFR  (AFRICAN AMERICAN): >60 ML/MIN/1.73 M^2
EST. GFR  (NON AFRICAN AMERICAN): 54.3 ML/MIN/1.73 M^2
GLUCOSE SERPL-MCNC: 284 MG/DL (ref 70–110)
HCT VFR BLD AUTO: 37.6 % (ref 37–48.5)
HGB BLD-MCNC: 12.1 G/DL (ref 12–16)
IMM GRANULOCYTES # BLD AUTO: 0.01 K/UL (ref 0–0.04)
IMM GRANULOCYTES NFR BLD AUTO: 0.2 % (ref 0–0.5)
LYMPHOCYTES # BLD AUTO: 1.4 K/UL (ref 1–4.8)
LYMPHOCYTES NFR BLD: 27.3 % (ref 18–48)
MCH RBC QN AUTO: 29.6 PG (ref 27–31)
MCHC RBC AUTO-ENTMCNC: 32.2 G/DL (ref 32–36)
MCV RBC AUTO: 92 FL (ref 82–98)
MONOCYTES # BLD AUTO: 0.6 K/UL (ref 0.3–1)
MONOCYTES NFR BLD: 10.5 % (ref 4–15)
NEUTROPHILS # BLD AUTO: 2.9 K/UL (ref 1.8–7.7)
NEUTROPHILS NFR BLD: 54.5 % (ref 38–73)
NRBC BLD-RTO: 0 /100 WBC
PLATELET # BLD AUTO: 189 K/UL (ref 150–350)
PMV BLD AUTO: 11.1 FL (ref 9.2–12.9)
POTASSIUM SERPL-SCNC: 4.1 MMOL/L (ref 3.5–5.1)
PROT SERPL-MCNC: 7.2 G/DL (ref 6–8.4)
RBC # BLD AUTO: 4.09 M/UL (ref 4–5.4)
SODIUM SERPL-SCNC: 137 MMOL/L (ref 136–145)
WBC # BLD AUTO: 5.24 K/UL (ref 3.9–12.7)

## 2020-04-13 PROCEDURE — 85025 COMPLETE CBC W/AUTO DIFF WBC: CPT

## 2020-04-13 PROCEDURE — 85651 RBC SED RATE NONAUTOMATED: CPT | Mod: PO

## 2020-04-13 PROCEDURE — 36415 COLL VENOUS BLD VENIPUNCTURE: CPT | Mod: PO

## 2020-04-13 PROCEDURE — 86235 NUCLEAR ANTIGEN ANTIBODY: CPT | Mod: 59

## 2020-04-13 PROCEDURE — 86140 C-REACTIVE PROTEIN: CPT

## 2020-04-13 PROCEDURE — 86038 ANTINUCLEAR ANTIBODIES: CPT

## 2020-04-13 PROCEDURE — 86039 ANTINUCLEAR ANTIBODIES (ANA): CPT

## 2020-04-13 PROCEDURE — 82306 VITAMIN D 25 HYDROXY: CPT

## 2020-04-13 PROCEDURE — 80053 COMPREHEN METABOLIC PANEL: CPT

## 2020-04-14 LAB
ANA SER QL IF: POSITIVE
ANA TITR SER IF: NORMAL {TITER}

## 2020-04-16 ENCOUNTER — OFFICE VISIT (OUTPATIENT)
Dept: RHEUMATOLOGY | Facility: CLINIC | Age: 60
End: 2020-04-16
Payer: MEDICAID

## 2020-04-16 VITALS — BODY MASS INDEX: 38.8 KG/M2 | HEIGHT: 63 IN | WEIGHT: 219 LBS

## 2020-04-16 DIAGNOSIS — M06.00 SERONEGATIVE RHEUMATOID ARTHRITIS: Primary | ICD-10-CM

## 2020-04-16 DIAGNOSIS — M47.812 CERVICAL ARTHRITIS: ICD-10-CM

## 2020-04-16 DIAGNOSIS — R76.8 ANA POSITIVE: ICD-10-CM

## 2020-04-16 DIAGNOSIS — M35.00 SJOGREN'S SYNDROME, WITH UNSPECIFIED ORGAN INVOLVEMENT: ICD-10-CM

## 2020-04-16 LAB
ANTI SM ANTIBODY: 0.14 RATIO (ref 0–0.99)
ANTI SM/RNP ANTIBODY: 0.16 RATIO (ref 0–0.99)
ANTI-SM INTERPRETATION: NEGATIVE
ANTI-SM/RNP INTERPRETATION: NEGATIVE
ANTI-SSA ANTIBODY: 0.06 RATIO (ref 0–0.99)
ANTI-SSA INTERPRETATION: NEGATIVE
ANTI-SSB ANTIBODY: 0.07 RATIO (ref 0–0.99)
ANTI-SSB INTERPRETATION: NEGATIVE
DSDNA AB SER-ACNC: NORMAL [IU]/ML

## 2020-04-16 PROCEDURE — 99443 PR PHYSICIAN TELEPHONE EVALUATION 21-30 MIN: ICD-10-PCS | Mod: 95,,, | Performed by: PHYSICIAN ASSISTANT

## 2020-04-16 PROCEDURE — 99443 PR PHYSICIAN TELEPHONE EVALUATION 21-30 MIN: CPT | Mod: 95,,, | Performed by: PHYSICIAN ASSISTANT

## 2020-04-16 RX ORDER — CHLORTHALIDONE 50 MG/1
50 TABLET ORAL DAILY
COMMUNITY
Start: 2020-03-11 | End: 2021-11-18 | Stop reason: SDUPTHER

## 2020-04-16 RX ORDER — PRAVASTATIN SODIUM 40 MG/1
40 TABLET ORAL DAILY
COMMUNITY
Start: 2020-02-27 | End: 2021-04-06 | Stop reason: SDUPTHER

## 2020-04-16 RX ORDER — CYCLOBENZAPRINE HCL 5 MG
5 TABLET ORAL DAILY PRN
Qty: 10 TABLET | Refills: 0 | Status: SHIPPED | OUTPATIENT
Start: 2020-04-16 | End: 2020-04-26

## 2020-04-16 RX ORDER — FLUOXETINE HYDROCHLORIDE 20 MG/1
20 CAPSULE ORAL
COMMUNITY
Start: 2020-02-19 | End: 2023-08-14 | Stop reason: SDUPTHER

## 2020-04-16 RX ORDER — HYDROXYCHLOROQUINE SULFATE 200 MG/1
TABLET, FILM COATED ORAL
COMMUNITY
Start: 2020-02-28 | End: 2020-07-17 | Stop reason: SDUPTHER

## 2020-04-16 RX ORDER — MELOXICAM 7.5 MG/1
7.5 TABLET ORAL DAILY
Qty: 30 TABLET | Refills: 2 | Status: SHIPPED | OUTPATIENT
Start: 2020-04-16 | End: 2020-05-16

## 2020-04-16 NOTE — PROGRESS NOTES
The patient location is: home  The chief complaint leading to consultation is: RA, Sjogren's  Visit type: audio only  Total time spent with patient: 21 minutes  Each patient to whom he or she provides medical services by telemedicine is:  (1) informed of the relationship between the physician and patient and the respective role of any other health care provider with respect to management of the patient; and (2) notified that he or she may decline to receive medical services by telemedicine and may withdraw from such care at any time.  Notes:   Subjective:       Patient ID: Patricia Fournier is a 59 y.o. female.    Chief Complaint: Disease Management and Rheumatoid Arthritis    Mrs. Fournier is a 59 year old female who presents to audio visit for follow up on seronegative RA, osteoarthritis, sjogren's syndrome. She is a new patient to me. She has been doing fair since her last visit and has noticed a significant improvement in her hand and wrist pain with addition of plaquenil. Her main concern is constant, aching, neck pain. No radicular sx or weakness. She never started celebrex because it was not covered by her insurance company. She complains of fatigue and is sleeping well overall with exception of the last few days. Uses a CPAP. Mood is good on fluoxetine. Recent cardiac work up including nuclear stress was negative for ischemia. She is followed by Pulmonary for pulmonary nodules and recently had PFTs, but is awaiting results. We reviewed recent labs.    Current tx:  1.  mg bid    Review of Systems   Constitutional: Positive for fatigue. Negative for activity change, appetite change, chills and fever.   Eyes: Negative for visual disturbance.   Respiratory: Negative for cough and shortness of breath.    Cardiovascular: Negative for chest pain, palpitations and leg swelling.   Gastrointestinal: Negative for abdominal pain, constipation, diarrhea, nausea and vomiting.   Musculoskeletal: Positive for arthralgias,  joint swelling and neck pain.   Neurological: Negative for dizziness, weakness, light-headedness and headaches.         Objective:   There were no vitals filed for this visit.    Past Medical History:   Diagnosis Date    Diabetes mellitus, type 2     Hypertension      Past Surgical History:   Procedure Laterality Date     SECTION      gallbladder removed      TUBAL LIGATION            Physical Exam   Constitutional: She is oriented to person, place, and time.   Neurological: She is alert and oriented to person, place, and time.       Recent labs reviewed:  Component      Latest Ref Rng & Units 2020   WBC      3.90 - 12.70 K/uL 5.24   RBC      4.00 - 5.40 M/uL 4.09   Hemoglobin      12.0 - 16.0 g/dL 12.1   Hematocrit      37.0 - 48.5 % 37.6   MCV      82 - 98 fL 92   MCH      27.0 - 31.0 pg 29.6   MCHC      32.0 - 36.0 g/dL 32.2   RDW      11.5 - 14.5 % 12.7   Platelets      150 - 350 K/uL 189   MPV      9.2 - 12.9 fL 11.1   Immature Granulocytes      0.0 - 0.5 % 0.2   Gran # (ANC)      1.8 - 7.7 K/uL 2.9   Immature Grans (Abs)      0.00 - 0.04 K/uL 0.01   Lymph #      1.0 - 4.8 K/uL 1.4   Mono #      0.3 - 1.0 K/uL 0.6   Eos #      0.0 - 0.5 K/uL 0.4   Baso #      0.00 - 0.20 K/uL 0.03   nRBC      0 /100 WBC 0   Gran%      38.0 - 73.0 % 54.5   Lymph%      18.0 - 48.0 % 27.3   Mono%      4.0 - 15.0 % 10.5   Eosinophil%      0.0 - 8.0 % 6.9   Basophil%      0.0 - 1.9 % 0.6   Differential Method       Automated   Sodium      136 - 145 mmol/L 137   Potassium      3.5 - 5.1 mmol/L 4.1   Chloride      95 - 110 mmol/L 100   CO2      23 - 29 mmol/L 28   Glucose      70 - 110 mg/dL 284 (H)   BUN, Bld      8 - 23 mg/dL 24 (H)   Creatinine      0.5 - 1.4 mg/dL 1.1   Calcium      8.7 - 10.5 mg/dL 9.5   PROTEIN TOTAL      6.0 - 8.4 g/dL 7.2   Albumin      3.5 - 5.2 g/dL 3.7   BILIRUBIN TOTAL      0.1 - 1.0 mg/dL 0.3   Alkaline Phosphatase      55 - 135 U/L 54 (L)   AST      10 - 40 U/L 16   ALT      10 - 44 U/L  22   Anion Gap      8 - 16 mmol/L 9   eGFR if African American      >60 mL/min/1.73 m:2 >60.0   eGFR if non African American      >60 mL/min/1.73 m:2 54.3 (A)   CRP      0.0 - 8.2 mg/L 1.1   Sed Rate      0 - 20 mm/Hr 8   GRACIELA Screen      Negative <1:80 Positive (A)   Vit D, 25-Hydroxy      30 - 96 ng/mL 31   GRACIELA HEP-2 Titer       Positive 1:320 Homogeneous   ds DNA Ab      Negative 1:10 Negative 1:10     CT chest reviewed:  Interface, Ev[facility] Incoming Radiology Results - 03/05/2020  9:45 AM CST  CT of the chest without contrast only    Clinical history is nodules    This is compared to a previous study from 8/27/2019    There are scattered small nodes in the mediastinum. The thoracic aorta appears intact. There is a lap   band in place. There is fullness of the left adrenal gland stable from the prior study.    There is again a small bleb in the right upper lobe stable from the prior study there are 2 small nodules   in the superior segment of the right lower lobe stable from the prior exam there are faint increased   markings in the lingula felt to represent scarring there is a faint density in the superior segment of   the left lower lobe also stable from the previous study seen best on series 3 image 23      IMPRESSION: Small nodules bilaterally stable when compared to the previous study. No acute abnormalities   are seen.  Assessment:       1. Seronegative rheumatoid arthritis    2. Sjogren's syndrome, with unspecified organ involvement    3. GRACIELA positive    4. Cervical arthritis            Plan:       Seronegative rheumatoid arthritis    Sjogren's syndrome, with unspecified organ involvement    GRACIELA positive    Cervical arthritis  -     meloxicam (MOBIC) 7.5 MG tablet; Take 1 tablet (7.5 mg total) by mouth once daily.  Dispense: 30 tablet; Refill: 2  -     cyclobenzaprine (FLEXERIL) 5 MG tablet; Take 1 tablet (5 mg total) by mouth daily as needed for Muscle spasms.  Dispense: 10 tablet; Refill: 0         Assessment:  59 year old female with  Seronegative RA, Sjogren's syndrome, GRACIELA 1:320 homogenous on plaquenil  --osteoarthritis  --renal insufficiency  --uncontrolled type 2 diabetes with hyperglycemia, last HgbA1c 8%-endocrine eval scheduled  --pulmonary nodules, restrictive lung disease followed by Pulmonary  --hx of lap band    Plan:  1. Cont. Plaquenil 200 mg bid  2. Add mobic 7.5 mg daily. Avoid additional otc nsaids and follow renal function in 12 weeks if impaired then consider d/c nsaids all together  3. Add flexeril 5 mg daily PRN for neck pain. Consider PT once covid is controlled  4. Follow GRACIELA profile once resulted    The patient is aware that there is a COVID-19 pandemic and that the immunosuppressants being taken to treat arthritis can increased the  risk for infection/Viruses.  This patient understands  to hold (not to take) all DMARD/Immunsuppressants with the exception of Plaquenil,  if having fever chills, cough, shortness of breath loss of sense of smell and or myalgias.  It is understood that the patient is to call the office as well as call their primary care physician and observe  social isolation    Follow up:  3 months w/routine labs prior

## 2020-05-07 ENCOUNTER — OFFICE VISIT (OUTPATIENT)
Dept: ENDOCRINOLOGY | Facility: CLINIC | Age: 60
End: 2020-05-07
Payer: MEDICAID

## 2020-05-07 DIAGNOSIS — N18.30 CKD (CHRONIC KIDNEY DISEASE) STAGE 3, GFR 30-59 ML/MIN: ICD-10-CM

## 2020-05-07 DIAGNOSIS — D35.02 ADENOMA OF LEFT ADRENAL GLAND: ICD-10-CM

## 2020-05-07 DIAGNOSIS — M17.0 PRIMARY OSTEOARTHRITIS OF BOTH KNEES: ICD-10-CM

## 2020-05-07 DIAGNOSIS — I10 ESSENTIAL HYPERTENSION: ICD-10-CM

## 2020-05-07 DIAGNOSIS — E78.2 MIXED HYPERLIPIDEMIA: ICD-10-CM

## 2020-05-07 PROCEDURE — 99204 PR OFFICE/OUTPT VISIT, NEW, LEVL IV, 45-59 MIN: ICD-10-PCS | Mod: 95,,, | Performed by: INTERNAL MEDICINE

## 2020-05-07 PROCEDURE — 99204 OFFICE O/P NEW MOD 45 MIN: CPT | Mod: 95,,, | Performed by: INTERNAL MEDICINE

## 2020-05-07 RX ORDER — BLOOD SUGAR DIAGNOSTIC
STRIP MISCELLANEOUS
Qty: 400 STRIP | Refills: 3 | Status: SHIPPED | OUTPATIENT
Start: 2020-05-07 | End: 2021-07-06

## 2020-05-07 RX ORDER — METFORMIN HYDROCHLORIDE EXTENDED-RELEASE TABLETS 1000 MG/1
1000 TABLET, FILM COATED, EXTENDED RELEASE ORAL 2 TIMES DAILY WITH MEALS
Qty: 180 TABLET | Refills: 3 | Status: SHIPPED | OUTPATIENT
Start: 2020-05-07 | End: 2020-10-23

## 2020-05-07 RX ORDER — LISINOPRIL 20 MG/1
20 TABLET ORAL DAILY
Qty: 90 TABLET | Refills: 3
Start: 2020-05-07 | End: 2021-04-06 | Stop reason: SDUPTHER

## 2020-05-07 NOTE — LETTER
May 7, 2020      Murali Wilson MD  [address] [facility] [address] [place] [address]           [facility]  [address] [place] [address]  Phone: [phone]  Fax: [phone]          Patient: Patricia Fournier   MR Number: [id number]   YOB: 1960   Date of Visit: 5/7/2020       Dear Dr. Murali Wilson:    Thank you for referring Patricia Fournier to me for evaluation. Attached you will find relevant portions of my assessment and plan of care.    If you have questions, please do not hesitate to call me. I look forward to following Patricia Fournier along with you.    Sincerely,    Juliette L. Sandifer Kum-Nji, MD    Enclosure  CC:  No Recipients    If you would like to receive this communication electronically, please contact [email or web address] or [phone] to request more information on [employer] access.    For providers and/or their staff who would like to refer a patient to [facility], please contact us through our one-stop-shop provider referral line, [facility], at [phone].    If you feel you have received this communication in error or would no longer like to receive these types of communications, please e-mail [email or web address]

## 2020-05-07 NOTE — ASSESSMENT & PLAN NOTE
"Reviewed outside records CT from our [facility] on May 7, 2019 with left benign adrenal adenoma."  The report did not describe the size or characteristics of the nodule.  Will attempt to reach out to their radiology to get more information.  Check biochemical workup.  "

## 2020-05-07 NOTE — PROGRESS NOTES
Subjective:    Patient ID:  Patricia Fournier is a 59 y.o. female.    Chief Complaint:  Diabetes and Adrenal Adenoma      Pt presents to establish care for T2 DM and L adrenal adenoma. Visit conducted over telephone. Start time 8:28 am.  End time 9:04 am.    The patient location is: [place], [place] (home)  Visit type: audio only  Each patient to whom he or she provides medical services by telemedicine is:  (1) informed of the relationship between the physician and patient and the respective role of any other health care provider with respect to management of the patient; and (2) notified that he or she may decline to receive medical services by telemedicine and may withdraw from such care at any time.    With regards to the diabetes:    Onset of Type 2 diabetes mellitus was 20 years ago. Started off with Metformin. Also has RA. No recent steroids.    Known diabetic complications: none  History of DKA: no  Cardiovascular risk factors: advanced age (older than 55 for men, 65 for women), diabetes mellitus, dyslipidemia and hypertension  Currently takin) Novolog 12 with dinner only  2) Lantus 56 daily  3) Januvia 100 mg q d  4) Metfomin 1000 mg BID. Has loose stool     PCP is Dr. Genevieve Haywood. Took Trulicity in the past and had hypoglycemia. BG was 40. She passed out, had ? seizure and went to the hospital. She was also taking insulin at the time. No history of pancreatitis.    Home blood sugar records:   Fasting 132-327 (on average around 200's)  Pre-lunch does not check  Pre-dinner does not check  Bedtime does not check    Current diet: on average, 2-3 meals per day. Sometimes skips breakfast. Notes she has bad cravings for sugars. Smoked in the past and now has cravings for sugar, especially at night. Snacks a lot at night- sandwich on white bread, fruit (grapes). Notes when her morning sugars are high, she was baking cookies. Craves chocolate. Has bad habit of eating both bread and sugar.  Current exercise: no  regular exercise, sometimes does yard work  Any episodes of hypoglycemia? no  Last eye exam: 2-3 months ago. No DR per pt.    Patient notes she was having GI symptoms and had CT of her abdomen May of last year.  She was found to have a left adrenal adenoma.  The report does not describe the size or characteristics of the nodule.  She does have hypertension but symptoms are well controlled with 2 medications lisinopril and hydrochlorothiazide.  No violaceous striae or buffalo hump. She denies voice deepening, clitoralmegaly episodic hypertension/diaphoresis/ flushing.            Diabetes Management Status    Statin: Taking  ACE/ARB: Not taking    Screening or Prevention Patient's value Goal Complete/Controlled?   HgA1C Testing and Control   Lab Results   Component Value Date    HGBA1C 8.7 (H) 11/14/2019      Annually/Less than 8% No   Lipid profile Most Recent Lipid Panel Health Maintenance Topic Completion: Not Found Annually No   LDL control Lab Results   Component Value Date    LDLCALC 117 08/25/2015    Annually/Less than 100 mg/dl  No   Nephropathy screening Lab Results   Component Value Date    LABMICR < 0.6 08/25/2015     No results found for: PROTEINUA Annually No   Blood pressure BP Readings from Last 1 Encounters:   01/27/20 137/76    Less than 140/90 Yes   Dilated retinal exam Most Recent Eye Exam Date: Not Found Annually No   Foot exam   Most Recent Foot Exam Date: Not Found Annually No       Review of Systems   Constitutional: Negative for unexpected weight change.   Eyes: Negative for visual disturbance.   Respiratory: Positive for shortness of breath.    Cardiovascular: Positive for leg swelling. Negative for chest pain.   Gastrointestinal: Negative for abdominal pain, constipation and diarrhea.   Endocrine: Negative for polydipsia, polyphagia and polyuria.   Musculoskeletal: Negative for myalgias.   Skin: Negative for wound.   Neurological: Positive for numbness. Negative for headaches.        In  toes     Psychiatric/Behavioral: Negative for sleep disturbance.        Past Medical History:   Diagnosis Date    Diabetes mellitus, type 2     Hypertension       Social History     Tobacco Use    Smoking status: Former Smoker     Packs/day: 1.50     Years: 20.00     Pack years: 30.00    Smokeless tobacco: Former User     Quit date: 2002   Substance Use Topics    Alcohol use: No     Alcohol/week: 0.0 standard drinks    Drug use: No     Family History   Problem Relation Age of Onset    Heart disease Mother     Diabetes Mother     Cancer Father     Diabetes Sister     Diabetes Brother     Diabetes Maternal Grandmother       Past Surgical History:   Procedure Laterality Date     SECTION      gallbladder removed      TUBAL LIGATION            Current Outpatient Medications:     ACCU-CHEK GUIDE Strp, Check blood sugar four times daily., Disp: 400 strip, Rfl: 3    chlorthalidone (HYGROTEN) 50 MG Tab, Take 50 mg by mouth once daily. , Disp: , Rfl:     FLUoxetine 20 MG capsule, Take 20 mg by mouth., Disp: , Rfl:     fluticasone propionate (FLONASE) 50 mcg/actuation nasal spray, 2 sprays by Each Nostril route once daily., Disp: , Rfl: 2    hydroxychloroquine (PLAQUENIL) 200 mg tablet, TAKE 1 TABLET BY MOUTH TWICE DAILY, Disp: , Rfl:     insulin glargine (LANTUS) 100 unit/mL injection, 20 units daily in AM, Disp: , Rfl:     IRON-MFOLATE-C-Q-P77-[id number]-SWAY-CUTZ ORAL, Take 1 tablet by mouth once daily., Disp: , Rfl:     liraglutide 0.6 mg/0.1 mL, 18 mg/3 mL, subq PNIJ (VICTOZA 2-BENJAMIN) 0.6 mg/0.1 mL (18 mg/3 mL) PnIj, Inject 0.6 mg into the skin daily for 2 weeks. If tolerating medication without side effects, increase to 1.2 mg thereafter., Disp: 3 mL, Rfl: 11    lisinopriL (PRINIVIL,ZESTRIL) 20 MG tablet, Take 1 tablet (20 mg total) by mouth once daily., Disp: 90 tablet, Rfl: 3    meloxicam (MOBIC) 7.5 MG tablet, Take 1 tablet (7.5 mg total) by mouth once daily., Disp: 30 tablet, Rfl: 2     metFORMIN (FORTAMET) 1,000 mg 24hr tablet, Take 1 tablet (1,000 mg total) by mouth 2 (two) times daily with meals., Disp: 180 tablet, Rfl: 3    miscellaneous medical supply (C-TUB) Misc, Diabetic shoes with full-length semi-rigid accommadative  Orthotics posting medial heel DX E11.9, [place]. Pes planus., Disp: , Rfl:     pravastatin (PRAVACHOL) 40 MG tablet, Take 40 mg by mouth once daily. , Disp: , Rfl:      Review of patient's allergies indicates:   Allergen Reactions    Allspice Hives     Pt states allergic to pimento( which is part of allspice family)    Trulicity [dulaglutide] Other (See Comments)     Severe hypoglycemic and hospitalized        Objective:   There were no vitals taken for this visit.  BP Readings from Last 3 Encounters:   01/27/20 137/76   11/14/19 130/72   12/30/15 (!) 141/70     Wt Readings from Last 3 Encounters:   04/16/20 0706 99.3 kg (219 lb)   01/27/20 1325 99.5 kg (219 lb 4 oz)   11/14/19 0922 95 kg (209 lb 7 oz)          Physical Exam  - not performed    Lab Results   Component Value Date     04/13/2020     (L) 08/25/2015    K 4.1 04/13/2020    K 4.3 08/25/2015     04/13/2020     08/25/2015    CO2 28 04/13/2020    BUN 24 (H) 04/13/2020    CREATININE 1.1 04/13/2020    CREATININE 0.52 08/25/2015     (H) 04/13/2020    HGBA1C 8.7 (H) 11/14/2019    AST 16 04/13/2020    ALT 22 04/13/2020    ALBUMIN 3.7 04/13/2020    ALBUMIN 3.9 08/25/2015    PROT 7.2 04/13/2020    BILITOT 0.3 04/13/2020    WBC 5.24 04/13/2020    HGB 12.1 04/13/2020    HCT 37.6 04/13/2020    MCV 92 04/13/2020    MCH 29.6 04/13/2020     04/13/2020    MPV 11.1 04/13/2020    GRAN 2.9 04/13/2020    GRAN 54.5 04/13/2020    LYMPH 1.4 04/13/2020    LYMPH 27.3 04/13/2020    CHOL 211 08/25/2015    HDL 43 08/25/2015    LDLCALC 117 08/25/2015    TRIG 256 (H) 08/25/2015       Lab Results   Component Value Date    TSH 2.382 11/14/2019    FREET4 0.91 11/14/2019        Thyroid Labs Latest Ref Rng &  Units 8/25/2015 11/14/2019 4/13/2020   TSH 0.400 - 4.000 uIU/mL 1.540 2.382 -   Free T4 0.71 - 1.51 ng/dL - 0.91 -   Sodium 136 - 145 mmol/L 135(L) 139 137   Potassium 3.5 - 5.1 mmol/L 4.3 4.4 4.1   Chloride 95 - 110 mmol/L 100 104 100   Carbon Dioxide 23 - 29 mmol/L 26 26 28   Glucose 70 - 110 mg/dL 346(H) 166(H) 284(H)   Blood Urea Nitrogen 8 - 23 mg/dL 16 21 24(H)   Creatinine 0.5 - 1.4 mg/dL 0.52 1.0 1.1   Calcium 8.7 - 10.5 mg/dL 9.3 10.0 9.5   Total Protein 6.0 - 8.4 g/dL 7.0 7.9 7.2   Albumin 3.5 - 5.2 g/dL 3.9 4.2 3.7   Total Bilirubin 0.1 - 1.0 mg/dL - 0.3 0.3   AST 10 - 40 U/L 20 13 16   ALT 10 - 44 U/L 35 16 22   Anion Gap 8 - 16 mmol/L 9 9 9   eGFR (African American) >60 mL/min/1.73 m:2 - >60.0 >60.0   eGFR (Non-African American) >60 mL/min/1.73 m:2 - >60.0 54.3(A)   WBC 3.90 - 12.70 K/uL - 4.11 5.24   RBC 4.00 - 5.40 M/uL - 4.19 4.09   Hemoglobin 12.0 - 16.0 g/dL - 11.3(L) 12.1   Hematocrit 37.0 - 48.5 % - 36.8(L) 37.6   MCV 82 - 98 fL - 88 92   MCH 27.0 - 31.0 pg - 27.0 29.6   MCHC 32.0 - 36.0 g/dL - 30.7(L) 32.2   RDW 11.5 - 14.5 % - 13.8 12.7   Platelets 150 - 350 K/uL - 238 189   MPV 9.2 - 12.9 fL - 11.2 11.1   Gran # 1.8 - 7.7 K/uL - 2.1 2.9   Lymph # 1.0 - 4.8 K/uL - 1.4 1.4   Mono # 0.3 - 1.0 K/uL - 0.3 0.6   Eos # 0.0 - 0.5 K/uL - 0.3 0.4   Baso # 0.00 - 0.20 K/uL - 0.03 0.03   Gran % 38.0 - 73.0 % - 50.7 54.5   Lymph % 18.0 - 48.0 % - 34.5 27.3   Mono% 4.0 - 15.0 % - 7.1 10.5   Eos % 0.0 - 8.0 % - 6.8 6.9   Baso % 0.0 - 1.9 % - 0.7 0.6   Thyroglobulin, Antibody Screen 0.0 - 3.9 IU/mL - <4.0 -   Thyroperoxidase Antibodies <6.0 IU/mL - <6.0 -           Hemoglobin A1C   Date Value Ref Range Status   11/14/2019 8.7 (H) 4.0 - 5.6 % Final     Comment:     ADA Screening Guidelines:  5.7-6.4%  Consistent with prediabetes  >or=6.5%  Consistent with diabetes  High levels of fetal hemoglobin interfere with the HbA1C  assay. Heterozygous hemoglobin variants (HbS, HgC, etc)do  not significantly interfere  "with this assay.   However, presence of multiple variants may affect accuracy.     08/25/2015 12.8 (H) 0.0 - 5.6 % Final     Comment:     Reference Interval:_5.0 - 5.6 Normal  5.7 - 6.4 High Risk  > 6.5 Diabetic  Hgb A1c results are standardized based on the (NGSP) National  Glycohemoglobin Standardization Program.  Hemoglobin A1C levels are related to mean serum/plasma glucose during the  preceding 2-3 months.     01/23/2004 11.7 (H) 4.5 - 6.2 % Final         Assessment and plan:       Problem List Items Addressed This Visit        Cardiac/Vascular    Hyperlipidemia    Current Assessment & Plan     Had lipid profile done with PCP. Bring record to next visit.  Continue statin.         Essential hypertension    Current Assessment & Plan     Continue lisinopril and hydrochlorothiazide.  Notify MD if blood pressure greater than 140/90.         Relevant Medications    lisinopriL (PRINIVIL,ZESTRIL) 20 MG tablet       Renal/    CKD (chronic kidney disease) stage 3, GFR 30-59 ml/min    Current Assessment & Plan     Most recent kidney function slightly reduced at GFR of 54.  Continue to monitor.  Avoid nephrotoxics.         Relevant Orders    Basic metabolic panel       Oncology    Adenoma of left adrenal gland    Current Assessment & Plan     Reviewed outside records CT from [facility] on May 7, 2019 with left benign adrenal adenoma."  The report did not describe the size or characteristics of the nodule.  Will attempt to reach out to their radiology to get more information.  Check biochemical workup.         Relevant Orders    Aldosterone    DHEA-sulfate    Metanephrines, Plasma Free    Renin    Cortisol, Saliva    Cortisol, Saliva       Endocrine    Diabetes mellitus type 2 with complications, uncontrolled - Primary    Current Assessment & Plan     BG not at goal.  HbA1C 8.7% Nov 2019  Continue Lantus 56, Novolog 12 with dinner.  Change Metformin to XR. Continue 1000 mg BID, take with largest 2 " meals.  Resume GLP-1. Will try Victoza. Pt to notify MD if hypoglycemia occurs. Stop Januvia once starting Victoza.  Check BG before meals and bedtime.   Call MD in 2 weeks with blood glucose log.  Up to date with eye exam   On Ace.  Discussed proper foot care.  Counseled pt on low carb/low fat diet and to increase physical activity. Really needs to reduce snacking as this causes vast fluctuations in her blood sugars.  On statin           Relevant Medications    liraglutide 0.6 mg/0.1 mL, 18 mg/3 mL, subq PNIJ (VICTOZA 2-BENJAMIN) 0.6 mg/0.1 mL (18 mg/3 mL) PnIj    metFORMIN (FORTAMET) 1,000 mg 24hr tablet    ACCU-CHEK GUIDE Strp    lisinopriL (PRINIVIL,ZESTRIL) 20 MG tablet    Other Relevant Orders    Hemoglobin A1C      Other Visit Diagnoses     Primary osteoarthritis of both knees                RTC in 3 months with all labs prior to visit  Get latest lipid panel and urine microalbumin from PCP (see HPI)

## 2020-05-07 NOTE — ASSESSMENT & PLAN NOTE
BG not at goal.  HbA1C 8.7% Nov 2019  Continue Lantus 56, Novolog 12 with dinner.  Change Metformin to XR. Continue 1000 mg BID, take with largest 2 meals.  Resume GLP-1. Will try Victoza. Pt to notify MD if hypoglycemia occurs. Stop Januvia once starting Victoza.  Check BG before meals and bedtime.   Call MD in 2 weeks with blood glucose log.  Up to date with eye exam   On Ace.  Discussed proper foot care.  Counseled pt on low carb/low fat diet and to increase physical activity. Really needs to reduce snacking as this causes vast fluctuations in her blood sugars.  On statin

## 2020-05-07 NOTE — ASSESSMENT & PLAN NOTE
Most recent kidney function slightly reduced at GFR of 54.  Continue to monitor.  Avoid nephrotoxics.

## 2020-05-14 ENCOUNTER — LAB VISIT (OUTPATIENT)
Dept: LAB | Facility: HOSPITAL | Age: 60
End: 2020-05-14
Payer: MEDICAID

## 2020-05-14 DIAGNOSIS — D35.02 ADENOMA OF LEFT ADRENAL GLAND: ICD-10-CM

## 2020-05-14 PROCEDURE — 82533 TOTAL CORTISOL: CPT

## 2020-05-19 LAB
CORTIS 12 AM SAL-MCNC: <50 NG/DL
CORTIS 8 AM SAL-MCNC: NORMAL NG/ML
CORTIS 8 PM SAL-MCNC: NORMAL NG/ML
CORTIS SAL-MCNC: NORMAL UG/DL

## 2020-05-19 PROCEDURE — 82533 TOTAL CORTISOL: CPT

## 2020-05-20 ENCOUNTER — TELEPHONE (OUTPATIENT)
Dept: ENDOCRINOLOGY | Facility: CLINIC | Age: 60
End: 2020-05-20

## 2020-05-20 ENCOUNTER — LAB VISIT (OUTPATIENT)
Dept: LAB | Facility: HOSPITAL | Age: 60
End: 2020-05-20
Attending: INTERNAL MEDICINE
Payer: MEDICAID

## 2020-05-20 DIAGNOSIS — D35.02 ADENOMA OF LEFT ADRENAL GLAND: ICD-10-CM

## 2020-05-20 NOTE — TELEPHONE ENCOUNTER
Please inform pt the first salivary cortisol test was normal. Still waiting on the second one to come back.

## 2020-05-27 ENCOUNTER — TELEPHONE (OUTPATIENT)
Dept: ENDOCRINOLOGY | Facility: CLINIC | Age: 60
End: 2020-05-27

## 2020-05-27 NOTE — TELEPHONE ENCOUNTER
S/w pt, informed of Dr. Sandifer's message below. Verbalized understanding.    Dr. Sandifer- pt states she has been tolerating the 1.2mg of Victoza. Please write new Rx for the 1.2mg if appropriate.

## 2020-06-25 DIAGNOSIS — M06.00 SERONEGATIVE RHEUMATOID ARTHRITIS: Primary | ICD-10-CM

## 2020-06-25 RX ORDER — MELOXICAM 7.5 MG/1
7.5 TABLET ORAL DAILY
Qty: 30 TABLET | Refills: 5 | Status: SHIPPED | OUTPATIENT
Start: 2020-06-25 | End: 2020-07-17

## 2020-07-09 ENCOUNTER — LAB VISIT (OUTPATIENT)
Dept: LAB | Facility: HOSPITAL | Age: 60
End: 2020-07-09
Attending: PHYSICIAN ASSISTANT
Payer: MEDICAID

## 2020-07-09 DIAGNOSIS — M35.00 SJOGREN'S SYNDROME, WITH UNSPECIFIED ORGAN INVOLVEMENT: ICD-10-CM

## 2020-07-09 DIAGNOSIS — R76.8 ANA POSITIVE: ICD-10-CM

## 2020-07-09 DIAGNOSIS — M06.00 SERONEGATIVE RHEUMATOID ARTHRITIS: ICD-10-CM

## 2020-07-09 LAB
ALBUMIN SERPL BCP-MCNC: 3.9 G/DL (ref 3.5–5.2)
ALP SERPL-CCNC: 57 U/L (ref 55–135)
ALT SERPL W/O P-5'-P-CCNC: 25 U/L (ref 10–44)
ANION GAP SERPL CALC-SCNC: 11 MMOL/L (ref 8–16)
AST SERPL-CCNC: 20 U/L (ref 10–40)
BASOPHILS # BLD AUTO: 0.03 K/UL (ref 0–0.2)
BASOPHILS NFR BLD: 0.5 % (ref 0–1.9)
BILIRUB SERPL-MCNC: 0.3 MG/DL (ref 0.1–1)
BUN SERPL-MCNC: 24 MG/DL (ref 8–23)
CALCIUM SERPL-MCNC: 10.1 MG/DL (ref 8.7–10.5)
CHLORIDE SERPL-SCNC: 103 MMOL/L (ref 95–110)
CO2 SERPL-SCNC: 23 MMOL/L (ref 23–29)
CREAT SERPL-MCNC: 1.1 MG/DL (ref 0.5–1.4)
CRP SERPL-MCNC: 2.2 MG/L (ref 0–8.2)
DIFFERENTIAL METHOD: ABNORMAL
EOSINOPHIL # BLD AUTO: 0.5 K/UL (ref 0–0.5)
EOSINOPHIL NFR BLD: 7.7 % (ref 0–8)
ERYTHROCYTE [DISTWIDTH] IN BLOOD BY AUTOMATED COUNT: 12.4 % (ref 11.5–14.5)
ERYTHROCYTE [SEDIMENTATION RATE] IN BLOOD BY WESTERGREN METHOD: 3 MM/HR (ref 0–20)
EST. GFR  (AFRICAN AMERICAN): >60 ML/MIN/1.73 M^2
EST. GFR  (NON AFRICAN AMERICAN): 53.9 ML/MIN/1.73 M^2
GLUCOSE SERPL-MCNC: 291 MG/DL (ref 70–110)
HCT VFR BLD AUTO: 37.7 % (ref 37–48.5)
HGB BLD-MCNC: 12 G/DL (ref 12–16)
IMM GRANULOCYTES # BLD AUTO: 0.03 K/UL (ref 0–0.04)
IMM GRANULOCYTES NFR BLD AUTO: 0.5 % (ref 0–0.5)
LYMPHOCYTES # BLD AUTO: 1.3 K/UL (ref 1–4.8)
LYMPHOCYTES NFR BLD: 22.3 % (ref 18–48)
MCH RBC QN AUTO: 30.5 PG (ref 27–31)
MCHC RBC AUTO-ENTMCNC: 31.8 G/DL (ref 32–36)
MCV RBC AUTO: 96 FL (ref 82–98)
MONOCYTES # BLD AUTO: 0.5 K/UL (ref 0.3–1)
MONOCYTES NFR BLD: 9 % (ref 4–15)
NEUTROPHILS # BLD AUTO: 3.6 K/UL (ref 1.8–7.7)
NEUTROPHILS NFR BLD: 60 % (ref 38–73)
NRBC BLD-RTO: 0 /100 WBC
PLATELET # BLD AUTO: 184 K/UL (ref 150–350)
PMV BLD AUTO: 11.8 FL (ref 9.2–12.9)
POTASSIUM SERPL-SCNC: 4.6 MMOL/L (ref 3.5–5.1)
PROT SERPL-MCNC: 7.3 G/DL (ref 6–8.4)
RBC # BLD AUTO: 3.93 M/UL (ref 4–5.4)
SODIUM SERPL-SCNC: 137 MMOL/L (ref 136–145)
WBC # BLD AUTO: 5.97 K/UL (ref 3.9–12.7)

## 2020-07-09 PROCEDURE — 86140 C-REACTIVE PROTEIN: CPT

## 2020-07-09 PROCEDURE — 85651 RBC SED RATE NONAUTOMATED: CPT | Mod: PO

## 2020-07-09 PROCEDURE — 80053 COMPREHEN METABOLIC PANEL: CPT

## 2020-07-09 PROCEDURE — 36415 COLL VENOUS BLD VENIPUNCTURE: CPT | Mod: PO

## 2020-07-09 PROCEDURE — 85025 COMPLETE CBC W/AUTO DIFF WBC: CPT

## 2020-07-14 ENCOUNTER — TELEPHONE (OUTPATIENT)
Dept: NEUROLOGY | Facility: CLINIC | Age: 60
End: 2020-07-14

## 2020-07-14 NOTE — TELEPHONE ENCOUNTER
----- Message from Areli Angulo PA-C sent at 7/10/2020  4:27 PM CDT -----  Blood sugar is elevated. Kidney function is down slightly. Inflammatory markers are normal. Please keep your follow up visit with Dr. Wilson as scheduled to review results in further details.

## 2020-07-14 NOTE — TELEPHONE ENCOUNTER
Contacted patient regarding lab results. Patient stated she was fasting for labs and blood sugar has been being elevated. Patient has appointment on 7/17 with Dr Wilson. Patient will keep this appointment

## 2020-07-17 ENCOUNTER — HOSPITAL ENCOUNTER (OUTPATIENT)
Dept: RADIOLOGY | Facility: HOSPITAL | Age: 60
Discharge: HOME | End: 2020-07-17
Attending: INTERNAL MEDICINE
Payer: MEDICAID

## 2020-07-17 ENCOUNTER — OFFICE VISIT (OUTPATIENT)
Dept: RHEUMATOLOGY | Facility: CLINIC | Age: 60
End: 2020-07-17
Payer: MEDICAID

## 2020-07-17 VITALS
DIASTOLIC BLOOD PRESSURE: 77 MMHG | HEART RATE: 82 BPM | BODY MASS INDEX: 38.79 KG/M2 | SYSTOLIC BLOOD PRESSURE: 120 MMHG | HEIGHT: 63 IN

## 2020-07-17 DIAGNOSIS — M17.0 PRIMARY OSTEOARTHRITIS OF BOTH KNEES: ICD-10-CM

## 2020-07-17 DIAGNOSIS — M54.9 DORSALGIA, UNSPECIFIED: ICD-10-CM

## 2020-07-17 DIAGNOSIS — M35.00 SJOGREN'S SYNDROME, WITH UNSPECIFIED ORGAN INVOLVEMENT: ICD-10-CM

## 2020-07-17 DIAGNOSIS — M54.50 LUMBAR BACK PAIN: ICD-10-CM

## 2020-07-17 DIAGNOSIS — M06.00 SERONEGATIVE RHEUMATOID ARTHRITIS: Primary | ICD-10-CM

## 2020-07-17 DIAGNOSIS — M06.00 SERONEGATIVE RHEUMATOID ARTHRITIS: ICD-10-CM

## 2020-07-17 PROCEDURE — 99999 PR PBB SHADOW E&M-EST. PATIENT-LVL III: CPT | Mod: PBBFAC,,, | Performed by: INTERNAL MEDICINE

## 2020-07-17 PROCEDURE — 99213 OFFICE O/P EST LOW 20 MIN: CPT | Mod: PBBFAC,25,PO | Performed by: INTERNAL MEDICINE

## 2020-07-17 PROCEDURE — 99214 OFFICE O/P EST MOD 30 MIN: CPT | Mod: S$PBB,,, | Performed by: INTERNAL MEDICINE

## 2020-07-17 PROCEDURE — 72100 X-RAY EXAM L-S SPINE 2/3 VWS: CPT | Mod: 26,,, | Performed by: RADIOLOGY

## 2020-07-17 PROCEDURE — 72100 XR LUMBAR SPINE AP AND LATERAL: ICD-10-PCS | Mod: 26,,, | Performed by: RADIOLOGY

## 2020-07-17 PROCEDURE — 72100 X-RAY EXAM L-S SPINE 2/3 VWS: CPT | Mod: TC,FY,PO

## 2020-07-17 PROCEDURE — 99214 PR OFFICE/OUTPT VISIT, EST, LEVL IV, 30-39 MIN: ICD-10-PCS | Mod: S$PBB,,, | Performed by: INTERNAL MEDICINE

## 2020-07-17 PROCEDURE — 99999 PR PBB SHADOW E&M-EST. PATIENT-LVL III: ICD-10-PCS | Mod: PBBFAC,,, | Performed by: INTERNAL MEDICINE

## 2020-07-17 RX ORDER — MELOXICAM 15 MG/1
15 TABLET ORAL DAILY
Qty: 30 TABLET | Refills: 5 | Status: SHIPPED | OUTPATIENT
Start: 2020-07-17 | End: 2020-08-16

## 2020-07-17 RX ORDER — CYCLOBENZAPRINE HCL 5 MG
5 TABLET ORAL 3 TIMES DAILY PRN
COMMUNITY
End: 2020-11-18

## 2020-07-17 RX ORDER — HYDROXYCHLOROQUINE SULFATE 200 MG/1
200 TABLET, FILM COATED ORAL 2 TIMES DAILY
Qty: 60 TABLET | Refills: 5 | Status: SHIPPED | OUTPATIENT
Start: 2020-07-17 | End: 2020-08-16

## 2020-07-17 ASSESSMENT — ROUTINE ASSESSMENT OF PATIENT INDEX DATA (RAPID3)
TOTAL RAPID3 SCORE: 4.05
MDHAQ FUNCTION SCORE: 1.1
PSYCHOLOGICAL DISTRESS SCORE: 0
PATIENT GLOBAL ASSESSMENT SCORE: 4
FATIGUE SCORE: 1.1
PAIN SCORE: 4.5

## 2020-07-17 NOTE — PROGRESS NOTES
Subjective:       Patient ID: Patricia Fournier is a 60 y.o. female.    Chief Complaint: Disease Management and Rheumatoid Arthritis (seronegative rheumatoid arthritis )    Follow up:  59 year old female  seronegative RA, osteoarthritis, sjogren's syndrome. New onset lumbar pain radiates around her waste. Started having back pain added flexeril.,Her main concern is constant, aching, neck pain. No radicular sx or weakness. She complains of fatigue and is sleeping well overall with exception of the last few days. Uses a CPAP. Mood is good on fluoxetine. Current tx:  1.  mg bid            She complains of joint swelling. Associated symptoms include fatigue and myalgias. Pertinent negatives include no dysuria, fever, trouble swallowing or headaches.         Review of Systems   Constitutional: Positive for activity change and fatigue. Negative for appetite change, chills, diaphoresis, fever and unexpected weight change.   HENT: Negative for congestion, dental problem, ear discharge, ear pain, facial swelling, mouth sores, nosebleeds, postnasal drip, rhinorrhea, sinus pressure, sneezing, sore throat, tinnitus, trouble swallowing and voice change.    Eyes: Negative for photophobia, pain, discharge, redness, itching and visual disturbance.   Respiratory: Negative for apnea, cough, chest tightness, shortness of breath and wheezing.    Cardiovascular: Negative for chest pain, palpitations and leg swelling.   Gastrointestinal: Negative for abdominal distention, abdominal pain, constipation, diarrhea, nausea and vomiting.   Endocrine: Negative for cold intolerance, heat intolerance, polydipsia and polyuria.   Genitourinary: Negative for decreased urine volume, difficulty urinating, dysuria, flank pain, frequency, hematuria and urgency.   Musculoskeletal: Positive for arthralgias, back pain, gait problem, joint swelling, myalgias, neck pain and neck stiffness.   Skin: Negative for pallor, rash and wound.    Allergic/Immunologic: Negative for immunocompromised state.   Neurological: Negative for dizziness, tremors, weakness, light-headedness, numbness and headaches.   Hematological: Negative for adenopathy. Does not bruise/bleed easily.   Psychiatric/Behavioral: Negative for sleep disturbance. The patient is not nervous/anxious.          Objective:     Vitals:    20 0934   BP: 120/77   Pulse: 82       Past Medical History:   Diagnosis Date    Diabetes mellitus, type 2     Hypertension      Past Surgical History:   Procedure Laterality Date     SECTION      gallbladder removed      TUBAL LIGATION            Physical Exam   Vitals reviewed.  Constitutional: She is oriented to person, place, and time and well-developed, well-nourished, and in no distress.   HENT:   Head: Normocephalic and atraumatic.   Mouth/Throat: Oropharynx is clear and moist.   Eyes: EOM are normal. Pupils are equal, round, and reactive to light.   Neck: Neck supple. No thyromegaly present.   Cardiovascular: Normal rate, regular rhythm and normal heart sounds.  Exam reveals no gallop and no friction rub.    No murmur heard.  Pulmonary/Chest: Breath sounds normal. She has no wheezes. She has no rales. She exhibits no tenderness.   Abdominal: There is no abdominal tenderness. There is no rebound and no guarding.       Right Side Rheumatological Exam     Examination finds the elbow normal.    The patient is tender to palpation of the shoulder, wrist, knee, 1st PIP, 1st MCP, 2nd PIP, 2nd MCP, 3rd PIP, 3rd MCP, 4th PIP, 4th MCP, 5th PIP and 5th MCP    Shoulder Exam   Tenderness Location: acromion    Range of Motion   Active abduction: abnormal   Adduction: abnormal  Sensation: normal    Knee Exam   Tenderness Location: medial joint line and LCL  Patellofemoral Crepitus: positive  Effusion: positive  Sensation: normal    Hip Exam   Tenderness Location: posterior and lateral  Sensation: normal    Elbow/Wrist Exam   Tenderness Location: no  tenderness  Sensation: normal    Left Side Rheumatological Exam     The patient is tender to palpation of the shoulder, elbow, wrist, knee, 1st PIP, 1st MCP, 2nd PIP, 2nd MCP, 3rd PIP, 3rd MCP, 4th PIP, 4th MCP, 5th PIP and 5th MCP.    Shoulder Exam   Tenderness Location: acromion    Range of Motion   Active abduction: abnormal   Sensation: normal    Knee Exam   Tenderness Location: lateral joint line and medial joint line    Patellofemoral Crepitus: positive  Effusion: positive  Sensation: normal    Hip Exam   Tenderness Location: posterior and lateral  Sensation: normal    Elbow/Wrist Exam   Sensation: normal      Back/Neck Exam   Tenderness Right paramedian tenderness of the Lower L-Spine.Left paramedian tenderness of the Lower L-Spine.    Back Range of Motion   Extension: abnormal  Flexion: abnormal    Neck Range of Motion   Flexion: Moderate  Extension: Moderate  Lymphadenopathy:     She has no cervical adenopathy.   Neurological: She is alert and oriented to person, place, and time. Gait normal.   Skin: Rash noted. There is erythema. There is pallor.     Psychiatric: Mood, memory, affect and judgment normal.   Musculoskeletal: Deformity present.         Results for orders placed or performed in visit on 07/09/20   CBC auto differential   Result Value Ref Range    WBC 5.97 3.90 - 12.70 K/uL    RBC 3.93 (L) 4.00 - 5.40 M/uL    Hemoglobin 12.0 12.0 - 16.0 g/dL    Hematocrit 37.7 37.0 - 48.5 %    Mean Corpuscular Volume 96 82 - 98 fL    Mean Corpuscular Hemoglobin 30.5 27.0 - 31.0 pg    Mean Corpuscular Hemoglobin Conc 31.8 (L) 32.0 - 36.0 g/dL    RDW 12.4 11.5 - 14.5 %    Platelets 184 150 - 350 K/uL    MPV 11.8 9.2 - 12.9 fL    Immature Granulocytes 0.5 0.0 - 0.5 %    Gran # (ANC) 3.6 1.8 - 7.7 K/uL    Immature Grans (Abs) 0.03 0.00 - 0.04 K/uL    Lymph # 1.3 1.0 - 4.8 K/uL    Mono # 0.5 0.3 - 1.0 K/uL    Eos # 0.5 0.0 - 0.5 K/uL    Baso # 0.03 0.00 - 0.20 K/uL    nRBC 0 0 /100 WBC    Gran% 60.0 38.0 - 73.0 %     Lymph% 22.3 18.0 - 48.0 %    Mono% 9.0 4.0 - 15.0 %    Eosinophil% 7.7 0.0 - 8.0 %    Basophil% 0.5 0.0 - 1.9 %    Differential Method Automated    Comprehensive metabolic panel   Result Value Ref Range    Sodium 137 136 - 145 mmol/L    Potassium 4.6 3.5 - 5.1 mmol/L    Chloride 103 95 - 110 mmol/L    CO2 23 23 - 29 mmol/L    Glucose 291 (H) 70 - 110 mg/dL    BUN, Bld 24 (H) 8 - 23 mg/dL    Creatinine 1.1 0.5 - 1.4 mg/dL    Calcium 10.1 8.7 - 10.5 mg/dL    Total Protein 7.3 6.0 - 8.4 g/dL    Albumin 3.9 3.5 - 5.2 g/dL    Total Bilirubin 0.3 0.1 - 1.0 mg/dL    Alkaline Phosphatase 57 55 - 135 U/L    AST 20 10 - 40 U/L    ALT 25 10 - 44 U/L    Anion Gap 11 8 - 16 mmol/L    eGFR if African American >60.0 >60 mL/min/1.73 m^2    eGFR if non  53.9 (A) >60 mL/min/1.73 m^2   C-Reactive Protein   Result Value Ref Range    CRP 2.2 0.0 - 8.2 mg/L   Sedimentation rate   Result Value Ref Range    Sed Rate 3 0 - 20 mm/Hr         Assessment:       1. Seronegative rheumatoid arthritis    2. Sjogren's syndrome, with unspecified organ involvement    3. Lumbar back pain    4. Dorsalgia, unspecified    5. Primary osteoarthritis of both knees            Plan:       Seronegative rheumatoid arthritis  -     meloxicam (MOBIC) 15 MG tablet; Take 1 tablet (15 mg total) by mouth once daily.  Dispense: 30 tablet; Refill: 5  -     hydroxychloroquine (PLAQUENIL) 200 mg tablet; Take 1 tablet (200 mg total) by mouth 2 (two) times daily.  Dispense: 60 tablet; Refill: 5  -     X-Ray Lumbar Spine AP And Lateral; Future; Expected date: 07/17/2020  -     GRACIELA Screen w/Reflex; Future; Expected date: 07/17/2020  -     Sjogrens syndrome-A extractable nuclear antibody; Future; Expected date: 07/17/2020  -     Sjogrens syndrome-B extractable nuclear antibody; Future; Expected date: 07/17/2020  -     TSH; Future; Expected date: 07/17/2020  -     T4, free; Future; Expected date: 07/17/2020  -     T3, free; Future; Expected date: 07/17/2020  -      CBC auto differential; Future; Expected date: 07/17/2020  -     Comprehensive metabolic panel; Future; Expected date: 07/17/2020  -     C-Reactive Protein; Future; Expected date: 07/17/2020  -     Sedimentation rate; Future; Expected date: 07/17/2020    Sjogren's syndrome, with unspecified organ involvement  -     meloxicam (MOBIC) 15 MG tablet; Take 1 tablet (15 mg total) by mouth once daily.  Dispense: 30 tablet; Refill: 5  -     hydroxychloroquine (PLAQUENIL) 200 mg tablet; Take 1 tablet (200 mg total) by mouth 2 (two) times daily.  Dispense: 60 tablet; Refill: 5  -     X-Ray Lumbar Spine AP And Lateral; Future; Expected date: 07/17/2020  -     GRACIELA Screen w/Reflex; Future; Expected date: 07/17/2020  -     Sjogrens syndrome-A extractable nuclear antibody; Future; Expected date: 07/17/2020  -     Sjogrens syndrome-B extractable nuclear antibody; Future; Expected date: 07/17/2020  -     TSH; Future; Expected date: 07/17/2020  -     T4, free; Future; Expected date: 07/17/2020  -     T3, free; Future; Expected date: 07/17/2020  -     CBC auto differential; Future; Expected date: 07/17/2020  -     Comprehensive metabolic panel; Future; Expected date: 07/17/2020  -     C-Reactive Protein; Future; Expected date: 07/17/2020  -     Sedimentation rate; Future; Expected date: 07/17/2020    Lumbar back pain  -     meloxicam (MOBIC) 15 MG tablet; Take 1 tablet (15 mg total) by mouth once daily.  Dispense: 30 tablet; Refill: 5  -     hydroxychloroquine (PLAQUENIL) 200 mg tablet; Take 1 tablet (200 mg total) by mouth 2 (two) times daily.  Dispense: 60 tablet; Refill: 5  -     X-Ray Lumbar Spine AP And Lateral; Future; Expected date: 07/17/2020  -     GRACIELA Screen w/Reflex; Future; Expected date: 07/17/2020  -     Sjogrens syndrome-A extractable nuclear antibody; Future; Expected date: 07/17/2020  -     Sjogrens syndrome-B extractable nuclear antibody; Future; Expected date: 07/17/2020  -     TSH; Future; Expected date:  07/17/2020  -     T4, free; Future; Expected date: 07/17/2020  -     T3, free; Future; Expected date: 07/17/2020  -     CBC auto differential; Future; Expected date: 07/17/2020  -     Comprehensive metabolic panel; Future; Expected date: 07/17/2020  -     C-Reactive Protein; Future; Expected date: 07/17/2020  -     Sedimentation rate; Future; Expected date: 07/17/2020    Dorsalgia, unspecified  -     X-Ray Lumbar Spine AP And Lateral; Future; Expected date: 07/17/2020  -     GRACIELA Screen w/Reflex; Future; Expected date: 07/17/2020  -     Sjogrens syndrome-A extractable nuclear antibody; Future; Expected date: 07/17/2020  -     Sjogrens syndrome-B extractable nuclear antibody; Future; Expected date: 07/17/2020  -     TSH; Future; Expected date: 07/17/2020  -     T4, free; Future; Expected date: 07/17/2020  -     T3, free; Future; Expected date: 07/17/2020  -     CBC auto differential; Future; Expected date: 07/17/2020  -     Comprehensive metabolic panel; Future; Expected date: 07/17/2020  -     C-Reactive Protein; Future; Expected date: 07/17/2020  -     Sedimentation rate; Future; Expected date: 07/17/2020    Primary osteoarthritis of both knees  -     GRACIELA Screen w/Reflex; Future; Expected date: 07/17/2020  -     Sjogrens syndrome-A extractable nuclear antibody; Future; Expected date: 07/17/2020  -     Sjogrens syndrome-B extractable nuclear antibody; Future; Expected date: 07/17/2020  -     TSH; Future; Expected date: 07/17/2020  -     T4, free; Future; Expected date: 07/17/2020  -     T3, free; Future; Expected date: 07/17/2020  -     CBC auto differential; Future; Expected date: 07/17/2020  -     Comprehensive metabolic panel; Future; Expected date: 07/17/2020  -     C-Reactive Protein; Future; Expected date: 07/17/2020  -     Sedimentation rate; Future; Expected date: 07/17/2020          Adding mobic 15 from 7.5 increase water 3 bottles a day  Adding tylenol 1000 mg twice a day  Continue her plaquenil

## 2020-07-17 NOTE — PATIENT INSTRUCTIONS
Increased her mobic 15 mg daily that is your Nsaid  Add tylenol extra strength twice a day ok to take 1000 mg twice a day as needed  Flexeril at night. Xray.

## 2020-08-06 ENCOUNTER — LAB VISIT (OUTPATIENT)
Dept: LAB | Facility: HOSPITAL | Age: 60
End: 2020-08-06
Attending: INTERNAL MEDICINE
Payer: MEDICAID

## 2020-08-06 DIAGNOSIS — N18.30 CKD (CHRONIC KIDNEY DISEASE) STAGE 3, GFR 30-59 ML/MIN: ICD-10-CM

## 2020-08-06 DIAGNOSIS — D35.02 ADENOMA OF LEFT ADRENAL GLAND: ICD-10-CM

## 2020-08-06 LAB
ANION GAP SERPL CALC-SCNC: 5 MMOL/L (ref 8–16)
BUN SERPL-MCNC: 30 MG/DL (ref 8–23)
CALCIUM SERPL-MCNC: 9.5 MG/DL (ref 8.7–10.5)
CHLORIDE SERPL-SCNC: 106 MMOL/L (ref 95–110)
CO2 SERPL-SCNC: 28 MMOL/L (ref 23–29)
CREAT SERPL-MCNC: 1.2 MG/DL (ref 0.5–1.4)
EST. GFR  (AFRICAN AMERICAN): 56 ML/MIN/1.73 M^2
EST. GFR  (NON AFRICAN AMERICAN): 48.6 ML/MIN/1.73 M^2
GLUCOSE SERPL-MCNC: 189 MG/DL (ref 70–110)
POTASSIUM SERPL-SCNC: 4.9 MMOL/L (ref 3.5–5.1)
SODIUM SERPL-SCNC: 139 MMOL/L (ref 136–145)

## 2020-08-06 PROCEDURE — 80048 BASIC METABOLIC PNL TOTAL CA: CPT

## 2020-08-06 PROCEDURE — 84244 ASSAY OF RENIN: CPT

## 2020-08-06 PROCEDURE — 83835 ASSAY OF METANEPHRINES: CPT

## 2020-08-06 PROCEDURE — 82627 DEHYDROEPIANDROSTERONE: CPT

## 2020-08-06 PROCEDURE — 82088 ASSAY OF ALDOSTERONE: CPT

## 2020-08-06 PROCEDURE — 36415 COLL VENOUS BLD VENIPUNCTURE: CPT | Mod: PO

## 2020-08-06 PROCEDURE — 83036 HEMOGLOBIN GLYCOSYLATED A1C: CPT

## 2020-08-07 ENCOUNTER — TELEPHONE (OUTPATIENT)
Dept: ENDOCRINOLOGY | Facility: CLINIC | Age: 60
End: 2020-08-07

## 2020-08-07 LAB
DHEA-S SERPL-MCNC: 36.5 UG/DL (ref 29.7–182.2)
ESTIMATED AVG GLUCOSE: 194 MG/DL (ref 68–131)
HBA1C MFR BLD HPLC: 8.4 % (ref 4–5.6)

## 2020-08-07 NOTE — TELEPHONE ENCOUNTER
S/w pt, informed of Dr. Sandifer's message below. Verbalized understanding. Pt will bring BG at appt on Thursday.

## 2020-08-07 NOTE — TELEPHONE ENCOUNTER
Please inform patient of recent labs:    Her most recent hemoglobin A1c was 8.4%.  Eight months ago it was 8.7%.  Patient should send me her blood sugar log to review so that I can see how we can further optimize her blood sugars.    Her kidney function has trended down slightly from gfr of 50's to now 48. She should have her PCP closely monitor her renal function or get referral to nephrology if not already seeing one. Needs better bg control which might also help to slow down further decline in her kidney function.     The blood test looking for excess hormones due to the adrenal nodule was normal.

## 2020-08-10 LAB — ALDOST SERPL-MCNC: 7.6 NG/DL

## 2020-08-11 LAB — RENIN PLAS-CCNC: 2.8 NG/ML/H

## 2020-08-13 LAB
METANEPH FREE SERPL-MCNC: <25 PG/ML
METANEPHS SERPL-MCNC: 40 PG/ML
NORMETANEPH FREE SERPL-MCNC: 40 PG/ML

## 2020-08-17 ENCOUNTER — TELEPHONE (OUTPATIENT)
Dept: RHEUMATOLOGY | Facility: CLINIC | Age: 60
End: 2020-08-17

## 2020-08-17 NOTE — TELEPHONE ENCOUNTER
Incoming return call from pt, reviewed results with pt. No questions, pt verbalized understanding.

## 2020-08-17 NOTE — TELEPHONE ENCOUNTER
----- Message from Murali Wilson MD sent at 8/8/2020  1:36 PM CDT -----  oa changes of lumbar spine mostly near the lower spine

## 2020-09-05 ENCOUNTER — OFFICE VISIT (OUTPATIENT)
Dept: URGENT CARE | Facility: CLINIC | Age: 60
End: 2020-09-05
Payer: MEDICAID

## 2020-09-05 VITALS
RESPIRATION RATE: 17 BRPM | WEIGHT: 219 LBS | BODY MASS INDEX: 38.8 KG/M2 | HEIGHT: 63 IN | DIASTOLIC BLOOD PRESSURE: 57 MMHG | TEMPERATURE: 99 F | SYSTOLIC BLOOD PRESSURE: 104 MMHG | OXYGEN SATURATION: 98 % | HEART RATE: 81 BPM

## 2020-09-05 DIAGNOSIS — R11.0 NAUSEA: ICD-10-CM

## 2020-09-05 DIAGNOSIS — R51.9 NONINTRACTABLE HEADACHE, UNSPECIFIED CHRONICITY PATTERN, UNSPECIFIED HEADACHE TYPE: Primary | ICD-10-CM

## 2020-09-05 DIAGNOSIS — R50.9 FEVER, UNSPECIFIED FEVER CAUSE: ICD-10-CM

## 2020-09-05 LAB
BILIRUB UR QL STRIP: POSITIVE
CTP QC/QA: YES
CTP QC/QA: YES
FLUAV AG NPH QL: NEGATIVE
FLUBV AG NPH QL: NEGATIVE
GLUCOSE UR QL STRIP: POSITIVE
KETONES UR QL STRIP: NEGATIVE
LEUKOCYTE ESTERASE UR QL STRIP: NEGATIVE
PH, POC UA: 7 (ref 5–8)
POC BLOOD, URINE: NEGATIVE
POC NITRATES, URINE: NEGATIVE
PROT UR QL STRIP: NEGATIVE
SARS-COV-2 RDRP RESP QL NAA+PROBE: NEGATIVE
SP GR UR STRIP: 1.01 (ref 1–1.03)
UROBILINOGEN UR STRIP-ACNC: ABNORMAL (ref 0.1–1.1)

## 2020-09-05 PROCEDURE — U0003 INFECTIOUS AGENT DETECTION BY NUCLEIC ACID (DNA OR RNA); SEVERE ACUTE RESPIRATORY SYNDROME CORONAVIRUS 2 (SARS-COV-2) (CORONAVIRUS DISEASE [COVID-19]), AMPLIFIED PROBE TECHNIQUE, MAKING USE OF HIGH THROUGHPUT TECHNOLOGIES AS DESCRIBED BY CMS-2020-01-R: HCPCS

## 2020-09-05 PROCEDURE — 87804 INFLUENZA ASSAY W/OPTIC: CPT | Mod: QW,S$GLB,, | Performed by: NURSE PRACTITIONER

## 2020-09-05 PROCEDURE — 99203 PR OFFICE/OUTPT VISIT, NEW, LEVL III, 30-44 MIN: ICD-10-PCS | Mod: 25,S$GLB,, | Performed by: NURSE PRACTITIONER

## 2020-09-05 PROCEDURE — 87635: ICD-10-PCS | Mod: QW,S$GLB,, | Performed by: NURSE PRACTITIONER

## 2020-09-05 PROCEDURE — 87635 SARS-COV-2 COVID-19 AMP PRB: CPT | Mod: QW,S$GLB,, | Performed by: NURSE PRACTITIONER

## 2020-09-05 PROCEDURE — 81003 POCT URINALYSIS, DIPSTICK, AUTOMATED, W/O SCOPE: ICD-10-PCS | Mod: QW,S$GLB,, | Performed by: NURSE PRACTITIONER

## 2020-09-05 PROCEDURE — 99203 OFFICE O/P NEW LOW 30 MIN: CPT | Mod: 25,S$GLB,, | Performed by: NURSE PRACTITIONER

## 2020-09-05 PROCEDURE — 87804 POCT INFLUENZA A/B: ICD-10-PCS | Mod: QW,S$GLB,, | Performed by: NURSE PRACTITIONER

## 2020-09-05 PROCEDURE — 81003 URINALYSIS AUTO W/O SCOPE: CPT | Mod: QW,S$GLB,, | Performed by: NURSE PRACTITIONER

## 2020-09-05 RX ORDER — ONDANSETRON 4 MG/1
4 TABLET, ORALLY DISINTEGRATING ORAL EVERY 6 HOURS PRN
Qty: 30 TABLET | Refills: 0 | Status: SHIPPED | OUTPATIENT
Start: 2020-09-05 | End: 2023-06-05

## 2020-09-05 RX ORDER — ONDANSETRON 2 MG/ML
4 INJECTION INTRAMUSCULAR; INTRAVENOUS
Status: DISCONTINUED | OUTPATIENT
Start: 2020-09-05 | End: 2020-09-05

## 2020-09-05 RX ORDER — ONDANSETRON 2 MG/ML
4 INJECTION INTRAMUSCULAR; INTRAVENOUS
Status: COMPLETED | OUTPATIENT
Start: 2020-09-05 | End: 2020-09-05

## 2020-09-05 RX ORDER — MELOXICAM 7.5 MG/1
7.5 TABLET ORAL DAILY
COMMUNITY
End: 2020-11-18 | Stop reason: SDUPTHER

## 2020-09-05 RX ORDER — HYDROXYCHLOROQUINE SULFATE 200 MG/1
200 TABLET, FILM COATED ORAL DAILY
COMMUNITY
End: 2020-11-18

## 2020-09-05 RX ADMIN — ONDANSETRON 4 MG: 2 INJECTION INTRAMUSCULAR; INTRAVENOUS at 02:09

## 2020-09-05 NOTE — PATIENT INSTRUCTIONS
PLEASE READ YOUR DISCHARGE INSTRUCTIONS ENTIRELY AS IT CONTAINS IMPORTANT INFORMATION.    Take the zofran for nausea (it dissolves under your tongue).    Use gatorade/pedialyte or rehydration packets to help stay hydrated. Vitamin water and plain water do not contain rehydrating electrolytes.  Increase clear liquids (water, gatorade, pedialyte, broths, jello, etc) Hold off on solids for 12-18 hours. Then advance to BRAT diet (banana, rice, applesauce, tea, toast/crackers), then advance further as tolerated. Avoid spicy or fatty foods.     Use Peptobismol to help alleviate your diarrhea symptoms.     Avoid imodium unless you have more than 6 loose stools in 24 hours.     Wash hands frequently while sick. Avoid ibuprofen or other NSAIDS until you are well.     Please go to the ER if you experience worsening pain, blood in your vomit or stool, high fever, dizziness, fainting, swelling of your abdomen, inability to pass gas or stool.       Please arrange follow up with your primary medical clinic as soon as possible. You must understand that you've received an Urgent Care treatment only and that you may be released before all of your medical problems are known or treated. You, the patient, will arrange for follow up as instructed. If your symptoms worsen or fail to improve you should go to the Emergency Room.  WE CANNOT RULE OUT ALL POSSIBLE CAUSES OF YOUR SYMPTOMS IN THE URGENT CARE SETTING PLEASE GO TO THE ER IF YOU FEELS YOUR CONDITION IS WORSENING OR YOU WOULD LIKE EMERGENT EVALUATION.      Viral Gastroenteritis (Adult)    Gastroenteritis is commonly called the stomach flu. It is most often caused by a virus that affects the stomach and intestinal tract and usually lasts from 2 to 7 days. Common viruses causing gastroenteritis include norovirus, rotavirus, and hepatitis A. Non-viral causes of gastroenteritis include bacteria, parasites, and toxins.  The danger from repeated vomiting or diarrhea is dehydration. This  is the loss of too much fluid from the body. When this occurs, body fluids must be replaced. Antibiotics do not help with this illness because it is usually viral.Simple home treatment will be helpful.  Symptoms of viral gastroenteritis may include:  · Watery, loose stools  · Stomach pain or abdominal cramps  · Fever and chills  · Nausea and vomiting  · Loss of bowel control  · Headache  Home care  Gastroenteritis is transmitted by contact with the stool or vomit of an infected person. This can occur from person to person or from contact with a contaminated surface.  Follow these guidelines when caring for yourself at home:  · If symptoms are severe, rest at home for the next 24 hours or until you are feeling better.  · Wash your hands with soap and water or use alcohol-based  to prevent the spread of infection. Wash your hands after touching anyone who is sick.  · Wash your hands or use alcohol-based  after using the toilet and before meals. Clean the toilet after each use.  Remember these tips when preparing food:  · People with diarrhea should not prepare or serve food to others. When preparing foods, wash your hands before and after.  · Wash your hands after using cutting boards, countertops, knives, or utensils that have been in contact with raw food.  · Keep uncooked meats away from cooked and ready-to-eat foods.  Medicine  You may use acetaminophen or NSAID medicines like ibuprofen or naproxen to control fever unless another medicine was given. If you have chronic liver or kidney disease, talk with your healthcare provider before using these medicines. Also talk with your provider if you've had a stomach ulcer or gastrointestinal bleeding. Don't give aspirin to anyone under 18 years of age who is ill with a fever. It may cause severe liver damage. Don't use NSAIDS is you are already taking one for another condition (like arthritis) or are on aspirin (such as for heart disease or after a  stroke).  If medicine for vomiting or diarrhea are prescribed, take these only as directed. Do not take over-the-counter medicines for vomiting or diarrhea unless instructed by your healthcare provider.  Diet  Follow these guidelines for food:  · Water and liquids are important so you don't get dehydrated. Drink a small amount at a time or suck on ice chips if you are vomiting.  · If you eat, avoid fatty, greasy, spicy, or fried foods.  · Don't eat dairy if you have diarrhea. This can make diarrhea worse.  · Avoid tobacco, alcohol, and caffeine which may worsen symptoms.  During the first 24 hours (the first full day), follow the diet below:  · Beverages. Sports drinks, soft drinks without caffeine, ginger ale, mineral water (plain or flavored), decaffeinated tea and coffee. If you are very dehydrated, sports drinks aren't a good choice. They have too much sugar and not enough electrolytes. In this case, commercially available products called oral rehydration solutions, are best.  · Soups. Eat clear broth, consommé, and bouillon.  · Desserts. Eat gelatin, popsicles, and fruit juice bars.  During the next 24 hours (the second day), you may add the following to the above:  · Hot cereal, plain toast, bread, rolls, and crackers  · Plain noodles, rice, mashed potatoes, chicken noodle or rice soup  · Unsweetened canned fruit (avoid pineapple), bananas  · Limit fat intake to less than 15 grams per day. Do this by avoiding margarine, butter, oils, mayonnaise, sauces, gravies, fried foods, peanut butter, meat, poultry, and fish.  · Limit fiber and avoid raw or cooked vegetables, fresh fruits (except bananas), and bran cereals.  · Limit caffeine and chocolate. Don't use spices or seasonings other than salt.  · Limit dairy products.  · Avoid alcohol.  During the next 24 hours:  · Gradually resume a normal diet as you feel better and your symptoms improve.  · If at any time it starts getting worse again, go back to clear  liquids until you feel better.  Follow-up care  Follow up with your healthcare provider, or as advised. Call your provider if you don't get better within 24 hours or if diarrhea lasts more than a week. Also follow up if you are unable to keep down liquids and get dehydrated. If a stool (diarrhea) sample was taken, call as directed for the results.  Call 911  Call 911 if any of these occur:  · Trouble breathing  · Chest pain  · Confused  · Severe drowsiness or trouble awakening  · Fainting or loss of consciousness  · Rapid heart rate  · Seizure  · Stiff neck  When to seek medical advice  Call your healthcare provider right away if any of these occur:  · Abdominal pain that gets worse  · Continued vomiting (unable to keep liquids down)  · Frequent diarrhea (more than 5 times a day)  · Blood in vomit or stool (black or red color)  · Dark urine, reduced urine output, or extreme thirst  · Weakness or dizziness  · Drowsiness  · Fever of 100.4°F (38°C) oral or higher that does not get better with fever medicine  · New rash  Date Last Reviewed: 1/3/2016  © 3128-4236 [employer]. [address] [place] [id number]. All rights reserved. This information is not intended as a substitute for professional medical care. Always follow your healthcare professional's instructions.

## 2020-09-05 NOTE — PROGRESS NOTES
"Subjective:       Patient ID: Patricia Fournier is a 60 y.o. female.    Vitals:  height is 5' 3" (1.6 m) and weight is 99.3 kg (219 lb). Her temperature is 99.3 °F (37.4 °C). Her blood pressure is 104/57 (abnormal) and her pulse is 81. Her respiration is 17 and oxygen saturation is 98%.     Chief Complaint: Headache    Pt presents to clinic today for evaluation of headache, fever (tmax 102), nausea, chills, and fatigue x 6 days. Pt states symptoms have improved as well as fever, however she feels like she has the flu. She is tolerating PO fluids and following bland diet including bananas, toast, and ramen noodles. Last BM 2 days ago was formed and brown. PMHx significant for DM II. Pt states her CBG has been elevated recently.    Headache   This is a new problem. The current episode started in the past 7 days. The problem has been gradually worsening. The pain is located in the frontal region. The pain does not radiate. The pain quality is not similar to prior headaches. The quality of the pain is described as throbbing. The pain is at a severity of 5/10. The pain is mild. Associated symptoms include a fever, muscle aches, nausea and vomiting. Pertinent negatives include no abdominal pain, abnormal behavior, anorexia, back pain, blurred vision, coughing, dizziness, drainage, ear pain, eye pain, eye redness, eye watering, facial sweating, hearing loss, insomnia, loss of balance, neck pain, numbness, phonophobia, photophobia, rhinorrhea, scalp tenderness, seizures, sinus pressure, sore throat, swollen glands, tingling, tinnitus, visual change, weakness or weight loss. The symptoms are aggravated by activity. She has tried NSAIDs and acetaminophen for the symptoms. The treatment provided no relief. There is no history of cancer, cluster headaches, hypertension, immunosuppression, migraine headaches, migraines in the family, obesity, pseudotumor cerebri, recent head traumas, sinus disease or TMJ.       Constitution: Positive " for appetite change, chills, fatigue and fever. Negative for sweating and generalized weakness.   HENT: Negative for ear pain, tinnitus, hearing loss, sinus pressure and sore throat.    Neck: Negative for neck pain.   Cardiovascular: Negative for chest pain.   Eyes: Negative for eye pain, eye redness, photophobia and blurred vision.   Respiratory: Negative for chest tightness, cough, shortness of breath, stridor and wheezing.    Gastrointestinal: Positive for nausea and vomiting. Negative for abdominal pain, constipation and diarrhea.   Musculoskeletal: Negative for back pain.   Neurological: Positive for light-headedness and headaches. Negative for dizziness, loss of balance, history of migraines, numbness and seizures.   Psychiatric/Behavioral: The patient does not have insomnia.        Objective:      Physical Exam   Constitutional: She is oriented to person, place, and time. She appears well-developed.  Non-toxic appearance. She appears ill. No distress.      Comments:Pt calm and cooperative though ill appearing in clinic today. NAD noted. No skin tenting noted.   HENT:   Head: Normocephalic and atraumatic.   Ears:   Right Ear: External ear normal.   Left Ear: External ear normal.   Nose: Nose normal.   Mouth/Throat: Oropharynx is clear and moist and mucous membranes are normal.   Mucous membranes moist.      Comments: Mucous membranes moist.  Eyes: Conjunctivae and lids are normal.   Neck: Trachea normal and full passive range of motion without pain. Neck supple.   Cardiovascular: Normal rate, regular rhythm and normal heart sounds.   Pulmonary/Chest: Effort normal and breath sounds normal. No stridor. No respiratory distress. She has no decreased breath sounds. She has no wheezes. She has no rhonchi. She has no rales.   Respirations even and unlabored. No s/sx of respiratory distress noted.    Comments: Respirations even and unlabored. No s/sx of respiratory distress noted.    Abdominal: Soft. Normal  appearance and bowel sounds are normal. She exhibits no distension, no abdominal bruit, no pulsatile midline mass and no mass. There is no abdominal tenderness. There is no rebound, no guarding, no left CVA tenderness and no right CVA tenderness.      Comments: Obese abdomen is nondistended and nontender to palpation. No CVA tenderness. Pt with emesis x1 while in clinic today. Symptoms alleviated with Zofran IM x1.   Musculoskeletal: Normal range of motion.   Neurological: She is alert and oriented to person, place, and time. She has normal strength.   Skin: Skin is warm, dry, intact, not diaphoretic and not pale. Psychiatric: Her speech is normal and behavior is normal. Judgment and thought content normal.   Nursing note and vitals reviewed.        Results for orders placed or performed in visit on 09/05/20   POCT COVID-19 Rapid Screening   Result Value Ref Range    POC Rapid COVID Negative Negative     Acceptable Yes    POCT Influenza A/B   Result Value Ref Range    Rapid Influenza A Ag Negative Negative    Rapid Influenza B Ag Negative Negative     Acceptable Yes    POCT Urinalysis, Dipstick, Automated, W/O Scope   Result Value Ref Range    POC Blood, Urine Negative Negative    POC Bilirubin, Urine Positive (A) Negative    POC Urobilinogen, Urine norm 0.1 - 1.1    POC Ketones, Urine Negative Negative    POC Protein, Urine Negative Negative    POC Nitrates, Urine Negative Negative    POC Glucose, Urine Positive (A) Negative    pH, UA 7.0 5 - 8    POC Specific Gravity, Urine 1.015 1.003 - 1.029    POC Leukocytes, Urine Negative Negative     Assessment:       1. Nonintractable headache, unspecified chronicity pattern, unspecified headache type    2. Nausea    3. Fever, unspecified fever cause        Plan:         Nonintractable headache, unspecified chronicity pattern, unspecified headache type  -     POCT COVID-19 Rapid Screening  -     POCT Influenza A/B  -     COVID-19 Routine  Screening    Nausea  -     POCT COVID-19 Rapid Screening  -     POCT Influenza A/B  -     POCT Urinalysis, Dipstick, Automated, W/O Scope  -     Discontinue: ondansetron injection 4 mg  -     ondansetron injection 4 mg  -     ondansetron (ZOFRAN-ODT) 4 MG TbDL; Take 1 tablet (4 mg total) by mouth every 6 (six) hours as needed.  Dispense: 30 tablet; Refill: 0  -     COVID-19 Routine Screening    Fever, unspecified fever cause  -     POCT COVID-19 Rapid Screening  -     POCT Influenza A/B  -     COVID-19 Routine Screening    Discussed negative COVID-19 and Influenza A/B test results, urinalysis findings, diagnosis, and treatment plan today. Advised strict ER precautions should symptoms fail to improve or worsen. Advised on importance of rest and hydration with oral electrolyte solution as well as BRAT diet when feeling better. Will send COVID-19 PCR test to lab to confirm negative COVID-19 test result. All applicable EKG, medical records, labs, imaging reviewed in Epic and discussed with patient. Instructed to follow up with PCP or go to ER if symptoms fail to improve or worsen. Pt verbalizes understanding.       Patient Instructions   PLEASE READ YOUR DISCHARGE INSTRUCTIONS ENTIRELY AS IT CONTAINS IMPORTANT INFORMATION.    Take the zofran for nausea (it dissolves under your tongue).    Use gatorade/pedialyte or rehydration packets to help stay hydrated. Vitamin water and plain water do not contain rehydrating electrolytes.  Increase clear liquids (water, gatorade, pedialyte, broths, jello, etc) Hold off on solids for 12-18 hours. Then advance to BRAT diet (banana, rice, applesauce, tea, toast/crackers), then advance further as tolerated. Avoid spicy or fatty foods.     Use Peptobismol to help alleviate your diarrhea symptoms.     Avoid imodium unless you have more than 6 loose stools in 24 hours.     Wash hands frequently while sick. Avoid ibuprofen or other NSAIDS until you are well.     Please go to the ER if you  experience worsening pain, blood in your vomit or stool, high fever, dizziness, fainting, swelling of your abdomen, inability to pass gas or stool.       Please arrange follow up with your primary medical clinic as soon as possible. You must understand that you've received an Urgent Care treatment only and that you may be released before all of your medical problems are known or treated. You, the patient, will arrange for follow up as instructed. If your symptoms worsen or fail to improve you should go to the Emergency Room.  WE CANNOT RULE OUT ALL POSSIBLE CAUSES OF YOUR SYMPTOMS IN THE URGENT CARE SETTING PLEASE GO TO THE ER IF YOU FEELS YOUR CONDITION IS WORSENING OR YOU WOULD LIKE EMERGENT EVALUATION.      Viral Gastroenteritis (Adult)    Gastroenteritis is commonly called the stomach flu. It is most often caused by a virus that affects the stomach and intestinal tract and usually lasts from 2 to 7 days. Common viruses causing gastroenteritis include norovirus, rotavirus, and hepatitis A. Non-viral causes of gastroenteritis include bacteria, parasites, and toxins.  The danger from repeated vomiting or diarrhea is dehydration. This is the loss of too much fluid from the body. When this occurs, body fluids must be replaced. Antibiotics do not help with this illness because it is usually viral.Simple home treatment will be helpful.  Symptoms of viral gastroenteritis may include:  · Watery, loose stools  · Stomach pain or abdominal cramps  · Fever and chills  · Nausea and vomiting  · Loss of bowel control  · Headache  Home care  Gastroenteritis is transmitted by contact with the stool or vomit of an infected person. This can occur from person to person or from contact with a contaminated surface.  Follow these guidelines when caring for yourself at home:  · If symptoms are severe, rest at home for the next 24 hours or until you are feeling better.  · Wash your hands with soap and water or use alcohol-based   to prevent the spread of infection. Wash your hands after touching anyone who is sick.  · Wash your hands or use alcohol-based  after using the toilet and before meals. Clean the toilet after each use.  Remember these tips when preparing food:  · People with diarrhea should not prepare or serve food to others. When preparing foods, wash your hands before and after.  · Wash your hands after using cutting boards, countertops, knives, or utensils that have been in contact with raw food.  · Keep uncooked meats away from cooked and ready-to-eat foods.  Medicine  You may use acetaminophen or NSAID medicines like ibuprofen or naproxen to control fever unless another medicine was given. If you have chronic liver or kidney disease, talk with your healthcare provider before using these medicines. Also talk with your provider if you've had a stomach ulcer or gastrointestinal bleeding. Don't give aspirin to anyone under 18 years of age who is ill with a fever. It may cause severe liver damage. Don't use NSAIDS is you are already taking one for another condition (like arthritis) or are on aspirin (such as for heart disease or after a stroke).  If medicine for vomiting or diarrhea are prescribed, take these only as directed. Do not take over-the-counter medicines for vomiting or diarrhea unless instructed by your healthcare provider.  Diet  Follow these guidelines for food:  · Water and liquids are important so you don't get dehydrated. Drink a small amount at a time or suck on ice chips if you are vomiting.  · If you eat, avoid fatty, greasy, spicy, or fried foods.  · Don't eat dairy if you have diarrhea. This can make diarrhea worse.  · Avoid tobacco, alcohol, and caffeine which may worsen symptoms.  During the first 24 hours (the first full day), follow the diet below:  · Beverages. Sports drinks, soft drinks without caffeine, ginger ale, mineral water (plain or flavored), decaffeinated tea and coffee. If you are very  dehydrated, sports drinks aren't a good choice. They have too much sugar and not enough electrolytes. In this case, commercially available products called oral rehydration solutions, are best.  · Soups. Eat clear broth, consommé, and bouillon.  · Desserts. Eat gelatin, popsicles, and fruit juice bars.  During the next 24 hours (the second day), you may add the following to the above:  · Hot cereal, plain toast, bread, rolls, and crackers  · Plain noodles, rice, mashed potatoes, chicken noodle or rice soup  · Unsweetened canned fruit (avoid pineapple), bananas  · Limit fat intake to less than 15 grams per day. Do this by avoiding margarine, butter, oils, mayonnaise, sauces, gravies, fried foods, peanut butter, meat, poultry, and fish.  · Limit fiber and avoid raw or cooked vegetables, fresh fruits (except bananas), and bran cereals.  · Limit caffeine and chocolate. Don't use spices or seasonings other than salt.  · Limit dairy products.  · Avoid alcohol.  During the next 24 hours:  · Gradually resume a normal diet as you feel better and your symptoms improve.  · If at any time it starts getting worse again, go back to clear liquids until you feel better.  Follow-up care  Follow up with your healthcare provider, or as advised. Call your provider if you don't get better within 24 hours or if diarrhea lasts more than a week. Also follow up if you are unable to keep down liquids and get dehydrated. If a stool (diarrhea) sample was taken, call as directed for the results.  Call 911  Call 911 if any of these occur:  · Trouble breathing  · Chest pain  · Confused  · Severe drowsiness or trouble awakening  · Fainting or loss of consciousness  · Rapid heart rate  · Seizure  · Stiff neck  When to seek medical advice  Call your healthcare provider right away if any of these occur:  · Abdominal pain that gets worse  · Continued vomiting (unable to keep liquids down)  · Frequent diarrhea (more than 5 times a day)  · Blood in vomit  or stool (black or red color)  · Dark urine, reduced urine output, or extreme thirst  · Weakness or dizziness  · Drowsiness  · Fever of 100.4°F (38°C) oral or higher that does not get better with fever medicine  · New rash  Date Last Reviewed: 1/3/2016  © 2863-1899 [employer]. [address] [place] [address], [place] [address]. All rights reserved. This information is not intended as a substitute for professional medical care. Always follow your healthcare professional's instructions.

## 2020-09-06 LAB — SARS-COV-2 RNA RESP QL NAA+PROBE: NOT DETECTED

## 2020-09-08 ENCOUNTER — TELEPHONE (OUTPATIENT)
Dept: URGENT CARE | Facility: CLINIC | Age: 60
End: 2020-09-08

## 2020-09-08 NOTE — TELEPHONE ENCOUNTER
2/3 attempts    ----- Message from Cm Esteban MD sent at 9/7/2020  8:07 AM CDT -----  Please call the patient regarding her normal result. If patient has been tested for COVID19:    --IF Test results are NEGATIVE and NO known high risk exposure to covid-19 virus, can exclude from work/school until:  o MINIMUM OF 24-48 hours fever-free without the use of fever-reducing medications AND  o Improvement in symptoms (e.g. cough, shortness of breath, fatigue, GI symptoms, etc)     --IF YOU ARE BEING TESTED BECAUSE OF A HIGH RISK EXPOSURE, which the CDC defines as direct contact 6 feet or less for >15 minutes with a known positive person, you should follow CDC guidelines as well as your employer/school protocols for safely returning to work/school. Please be aware that there are False Negative possibilities with testing and you should return to work based upon CDC guidelines, not simply a negative result, unless your employer/school has a different RTW protocol/guidance for you.

## 2020-09-10 ENCOUNTER — TELEPHONE (OUTPATIENT)
Dept: URGENT CARE | Facility: CLINIC | Age: 60
End: 2020-09-10

## 2020-09-10 NOTE — TELEPHONE ENCOUNTER
----- Message from Cm Esteban MD sent at 9/7/2020  8:07 AM CDT -----  Please call the patient regarding her normal result. If patient has been tested for COVID19:    --IF Test results are NEGATIVE and NO known high risk exposure to covid-19 virus, can exclude from work/school until:  o MINIMUM OF 24-48 hours fever-free without the use of fever-reducing medications AND  o Improvement in symptoms (e.g. cough, shortness of breath, fatigue, GI symptoms, etc)     --IF YOU ARE BEING TESTED BECAUSE OF A HIGH RISK EXPOSURE, which the CDC defines as direct contact 6 feet or less for >15 minutes with a known positive person, you should follow CDC guidelines as well as your employer/school protocols for safely returning to work/school. Please be aware that there are False Negative possibilities with testing and you should return to work based upon CDC guidelines, not simply a negative result, unless your employer/school has a different RTW protocol/guidance for you.

## 2020-10-23 ENCOUNTER — OFFICE VISIT (OUTPATIENT)
Dept: ENDOCRINOLOGY | Facility: CLINIC | Age: 60
End: 2020-10-23
Payer: MEDICAID

## 2020-10-23 VITALS
SYSTOLIC BLOOD PRESSURE: 120 MMHG | HEART RATE: 69 BPM | BODY MASS INDEX: 40.25 KG/M2 | WEIGHT: 227.19 LBS | HEIGHT: 63 IN | DIASTOLIC BLOOD PRESSURE: 68 MMHG

## 2020-10-23 DIAGNOSIS — E78.2 MIXED HYPERLIPIDEMIA: ICD-10-CM

## 2020-10-23 DIAGNOSIS — I10 ESSENTIAL HYPERTENSION: ICD-10-CM

## 2020-10-23 DIAGNOSIS — D35.02 ADENOMA OF LEFT ADRENAL GLAND: ICD-10-CM

## 2020-10-23 DIAGNOSIS — N18.30 STAGE 3 CHRONIC KIDNEY DISEASE, UNSPECIFIED WHETHER STAGE 3A OR 3B CKD: ICD-10-CM

## 2020-10-23 PROCEDURE — 99214 OFFICE O/P EST MOD 30 MIN: CPT | Mod: PBBFAC,PO | Performed by: INTERNAL MEDICINE

## 2020-10-23 PROCEDURE — 99214 PR OFFICE/OUTPT VISIT, EST, LEVL IV, 30-39 MIN: ICD-10-PCS | Mod: S$PBB,,, | Performed by: INTERNAL MEDICINE

## 2020-10-23 PROCEDURE — 99214 OFFICE O/P EST MOD 30 MIN: CPT | Mod: S$PBB,,, | Performed by: INTERNAL MEDICINE

## 2020-10-23 PROCEDURE — 99999 PR PBB SHADOW E&M-EST. PATIENT-LVL IV: ICD-10-PCS | Mod: PBBFAC,,, | Performed by: INTERNAL MEDICINE

## 2020-10-23 PROCEDURE — 99999 PR PBB SHADOW E&M-EST. PATIENT-LVL IV: CPT | Mod: PBBFAC,,, | Performed by: INTERNAL MEDICINE

## 2020-10-23 RX ORDER — INSULIN LISPRO 100 [IU]/ML
INJECTION, SOLUTION INTRAVENOUS; SUBCUTANEOUS
Qty: 4 BOX | Refills: 4 | Status: SHIPPED | OUTPATIENT
Start: 2020-10-23 | End: 2020-12-21

## 2020-10-23 RX ORDER — INSULIN GLARGINE 100 [IU]/ML
60 INJECTION, SOLUTION SUBCUTANEOUS DAILY
Qty: 3 BOX | Refills: 3
Start: 2020-10-23 | End: 2021-07-07 | Stop reason: SDUPTHER

## 2020-10-23 RX ORDER — METFORMIN HYDROCHLORIDE 500 MG/1
1000 TABLET, EXTENDED RELEASE ORAL 2 TIMES DAILY WITH MEALS
Qty: 360 TABLET | Refills: 3 | Status: SHIPPED | OUTPATIENT
Start: 2020-10-23 | End: 2021-10-26

## 2020-10-23 RX ORDER — PEN NEEDLE, DIABETIC 31 GX5/16"
NEEDLE, DISPOSABLE MISCELLANEOUS
Qty: 400 EACH | Refills: 3 | Status: SHIPPED | OUTPATIENT
Start: 2020-10-23 | End: 2021-08-20 | Stop reason: SDUPTHER

## 2020-10-23 RX ORDER — INSULIN LISPRO 100 [IU]/ML
12 INJECTION, SOLUTION INTRAVENOUS; SUBCUTANEOUS
COMMUNITY
Start: 2020-08-26 | End: 2020-10-23 | Stop reason: SDUPTHER

## 2020-10-23 RX ORDER — INSULIN GLARGINE 100 [IU]/ML
65 INJECTION, SOLUTION SUBCUTANEOUS
COMMUNITY
Start: 2020-10-22 | End: 2020-10-23

## 2020-10-23 RX ORDER — PANTOPRAZOLE SODIUM 40 MG/1
40 TABLET, DELAYED RELEASE ORAL
COMMUNITY
Start: 2020-10-08 | End: 2024-02-26 | Stop reason: SDUPTHER

## 2020-10-23 NOTE — ASSESSMENT & PLAN NOTE
"Reviewed outside records CT from [facility] on May 7, 2019 with left benign adrenal adenoma."  The report did not describe the size or characteristics of the nodule. Biochemical workup neg. Repeat CT in 1-2 years.  "

## 2020-10-23 NOTE — PROGRESS NOTES
Subjective:    Patient ID:  Patricia Fournier is a 60 y.o. female.    Chief Complaint:  Diabetes (here for f/u on diabetes. c/o Bg being high all the time. Fasting BG this )      Pt presents to follow up for T2 DM and L adrenal adenoma.     PCP is Dr. Genevieve Haywood    With regards to the diabetes:     Onset of Type 2 diabetes mellitus was 20 years ago. Started off with Metformin. Also has RA. No recent steroids.     Known diabetic complications: none  History of DKA: no  Cardiovascular risk factors: advanced age (older than 55 for men, 65 for women), diabetes mellitus, dyslipidemia and hypertension    Currently taking:  Continue Lantus 60  Novolog 15 with dinner  Metformin XR 1000 mg BID. No GI side effects  Victoza. Took for about one week. She felt weak but did not check blood sugar. Stopped taking and thinks she threw with medication away.    Took Trulicity in the past and had hypoglycemia. BG was 40. She passed out, had ? seizure and went to the hospital. She was also taking insulin at the time. No history of pancreatitis.     Did not meet with diabetic education yet.     Home blood sugar records:   Fasting 140-295  Pre-lunch does not check  Pre-dinner does not check  Bedtime does not check     Current diet: on average, 2-3 meals per day. Sometimes skips breakfast. Notes she has bad cravings for sugars. Smoked in the past and now has cravings for sugar, especially at night. Previously snacked a lot at night- sandwich on white bread, fruit (grapes). Has changed snacks to sugar free jello and pudding, and popcorn at night.   Current exercise: no regular exercise, sometimes does yard work  Any episodes of hypoglycemia? no  Last eye exam: 2-3 months ago. No DR per pt.     Patient notes she was having GI symptoms and had CT of her abdomen May of last year.  She was found to have a left adrenal adenoma.  The report does not describe the size or characteristics of the nodule.  She does have hypertension but  symptoms are well controlled with 2 medications lisinopril and hydrochlorothiazide.  No violaceous striae or buffalo hump. She denies voice deepening, clitoralmegaly episodic hypertension/diaphoresis/ flushing. Biochemical work up was normal.       Reduced kidney function.stable. had trended down slightly from gfr of 50's to now 48.        Diabetes Management Status    Statin: Taking  ACE/ARB: Taking    Screening or Prevention Patient's value Goal Complete/Controlled?   HgA1C Testing and Control   Lab Results   Component Value Date    HGBA1C 8.4 (H) 08/06/2020      Annually/Less than 8% No   Lipid profile : 06/15/2020 Annually No   LDL control Lab Results   Component Value Date    LDLCALC 117 08/25/2015    Annually/Less than 100 mg/dl  No   Nephropathy screening Lab Results   Component Value Date    LABMICR < 0.6 08/25/2015     No results found for: PROTEINUA Annually No   Blood pressure BP Readings from Last 1 Encounters:   10/23/20 120/68    Less than 140/90 Yes   Dilated retinal exam Most Recent Eye Exam Date: Not Found Annually No   Foot exam   : 10/23/2020 Annually Yes     Review of Systems   Constitutional: Negative for fatigue and unexpected weight change.   Eyes: Negative for visual disturbance.   Respiratory: Negative for shortness of breath.    Cardiovascular: Negative for chest pain and leg swelling.   Gastrointestinal: Negative for abdominal pain.   Endocrine: Negative for polydipsia, polyphagia and polyuria.   Musculoskeletal: Negative for myalgias.   Skin: Positive for wound.   Neurological: Positive for numbness. Negative for headaches.   Psychiatric/Behavioral: Positive for sleep disturbance.        Past Medical History:   Diagnosis Date    Diabetes mellitus, type 2     GERD (gastroesophageal reflux disease)     Hypertension       Social History     Tobacco Use    Smoking status: Former Smoker     Packs/day: 1.50     Years: 20.00     Pack years: 30.00    Smokeless tobacco: Former User     Quit  date: 2002   Substance Use Topics    Alcohol use: No     Alcohol/week: 0.0 standard drinks    Drug use: No     Family History   Problem Relation Age of Onset    Heart disease Mother     Diabetes Mother     Cancer Father     Diabetes Sister     Diabetes Brother     Diabetes Maternal Grandmother       Past Surgical History:   Procedure Laterality Date     SECTION      gallbladder removed      TUBAL LIGATION            Current Outpatient Medications:     ACCU-CHEK GUIDE Strp, Check blood sugar four times daily., Disp: 400 strip, Rfl: 3    chlorthalidone (HYGROTEN) 50 MG Tab, Take 50 mg by mouth once daily. , Disp: , Rfl:     cyclobenzaprine (FLEXERIL) 5 MG tablet, Take 5 mg by mouth 3 (three) times daily as needed for Muscle spasms., Disp: , Rfl:     FLUoxetine 20 MG capsule, Take 20 mg by mouth., Disp: , Rfl:     fluticasone propionate (FLONASE) 50 mcg/actuation nasal spray, 2 sprays by Each Nostril route once daily., Disp: , Rfl: 2    hydrOXYchloroQUINE (PLAQUENIL) 200 mg tablet, Take 200 mg by mouth once daily., Disp: , Rfl:     insulin (LANTUS SOLOSTAR U-100 INSULIN) glargine 100 units/mL (3mL) SubQ pen, Inject 60 Units into the skin once daily., Disp: 3 Box, Rfl: 3    insulin lispro 100 unit/mL pen, Take 20 units subq TID with meals. Reduce by 8-10 units if premeal blood glucose less than 100. Add SSI 2:50 if premeal BG >150. TDD not to exceed 80 units, Disp: 4 Box, Rfl: 4    IRON-MFOLATE-C-J-[id number]-[id number] ORAL, Take 1 tablet by mouth once daily., Disp: , Rfl:     liraglutide 0.6 mg/0.1 mL, 18 mg/3 mL, subq PNIJ (VICTOZA 2-BENJAMIN) 0.6 mg/0.1 mL (18 mg/3 mL) PnIj pen, Inject 0.6 mg into the skin daily for 2 weeks. If tolerating medication without side effects, increase to 1.2 mg thereafter., Disp: 6 mL, Rfl: 5    lisinopriL (PRINIVIL,ZESTRIL) 20 MG tablet, Take 1 tablet (20 mg total) by mouth once daily., Disp: 90 tablet, Rfl: 3    meloxicam (MOBIC) 7.5 MG tablet, Take 7.5 mg by  "mouth once daily., Disp: , Rfl:     miscellaneous medical supply (C-TUB) Misc, Diabetic shoes with full-length semi-rigid accommadative  Orthotics posting medial heel DX E11.9, [place]. Pes planus., Disp: , Rfl:     ondansetron (ZOFRAN-ODT) 4 MG TbDL, Take 1 tablet (4 mg total) by mouth every 6 (six) hours as needed., Disp: 30 tablet, Rfl: 0    pantoprazole (PROTONIX) 40 MG tablet, Take 40 mg by mouth., Disp: , Rfl:     pravastatin (PRAVACHOL) 40 MG tablet, Take 40 mg by mouth once daily. , Disp: , Rfl:     BD ULTRA-FINE GEENA PEN NEEDLE 32 gauge x 5/32" Ndle, Use 4x daily with insulin., Disp: 400 each, Rfl: 3    metFORMIN (GLUCOPHAGE-XR) 500 MG ER 24hr tablet, Take 2 tablets (1,000 mg total) by mouth 2 (two) times daily with meals., Disp: 360 tablet, Rfl: 3     Review of patient's allergies indicates:   Allergen Reactions    Allspice Hives     Pt states allergic to pimento( which is part of [employer] family)    Trulicity [dulaglutide] Other (See Comments)     Severe hypoglycemic and hospitalized        Objective:   /68   Pulse 69   Ht 5' 3" (1.6 m)   Wt 103 kg (227 lb 2.9 oz)   BMI 40.24 kg/m²   BP Readings from Last 3 Encounters:   10/23/20 120/68   09/05/20 (!) 104/57   07/17/20 120/77     Wt Readings from Last 3 Encounters:   10/23/20 1004 103 kg (227 lb 2.9 oz)   09/05/20 1334 99.3 kg (219 lb)   04/16/20 0706 99.3 kg (219 lb)          Physical Exam  Vitals signs reviewed.   Constitutional:       General: She is not in acute distress.     Appearance: She is well-developed.   HENT:      Head: Normocephalic and atraumatic.      Nose: Nose normal.   Eyes:      General: No scleral icterus.  Neck:      Thyroid: No thyromegaly.      Trachea: No tracheal deviation.      Comments:  No thyromegaly or palpable thyroid nodules    Cardiovascular:      Rate and Rhythm: Normal rate and regular rhythm.      Heart sounds: Normal heart sounds. No murmur.   Pulmonary:      Effort: Pulmonary effort is normal. No " respiratory distress.      Breath sounds: Normal breath sounds. No wheezing or rales.   Abdominal:      General: Bowel sounds are normal. There is no distension.      Palpations: Abdomen is soft.      Tenderness: There is no abdominal tenderness.      Comments: Obese, no No lipohypertrophy     Musculoskeletal:         General: No swelling.   Skin:     General: Skin is warm.      Comments: Foot exam:  12DP/PT on R and +2 on L. R foot with callus on heel and dry skin, no lesions or ulcers on L foot , nl microfilament bilat, mycotic toe nails bilat   Neurological:      Mental Status: She is alert and oriented to person, place, and time.      Cranial Nerves: No cranial nerve deficit.   Psychiatric:         Thought Content: Thought content normal.           Lab Results   Component Value Date     08/06/2020     (L) 08/25/2015    K 4.9 08/06/2020    K 4.3 08/25/2015     08/06/2020     08/25/2015    CO2 28 08/06/2020    BUN 30 (H) 08/06/2020    CREATININE 1.2 08/06/2020    CREATININE 0.52 08/25/2015     (H) 08/06/2020    HGBA1C 8.4 (H) 08/06/2020    AST 20 07/09/2020    ALT 25 07/09/2020    ALBUMIN 3.9 07/09/2020    ALBUMIN 3.9 08/25/2015    PROT 7.3 07/09/2020    BILITOT 0.3 07/09/2020    WBC 5.97 07/09/2020    HGB 12.0 07/09/2020    HCT 37.7 07/09/2020    MCV 96 07/09/2020    MCH 30.5 07/09/2020     07/09/2020    MPV 11.8 07/09/2020    GRAN 3.6 07/09/2020    GRAN 60.0 07/09/2020    LYMPH 1.3 07/09/2020    LYMPH 22.3 07/09/2020    CHOL 211 08/25/2015    HDL 43 08/25/2015    LDLCALC 117 08/25/2015    TRIG 256 (H) 08/25/2015       Lab Results   Component Value Date    TSH 2.382 11/14/2019    FREET4 0.91 11/14/2019        Thyroid Labs Latest Ref Rng & Units 4/13/2020 7/9/2020 8/6/2020   TSH 0.400 - 4.000 uIU/mL - - -   Free T4 0.71 - 1.51 ng/dL - - -   Sodium 136 - 145 mmol/L 137 137 139   Potassium 3.5 - 5.1 mmol/L 4.1 4.6 4.9   Chloride 95 - 110 mmol/L 100 103 106   Carbon Dioxide 23 -  29 mmol/L 28 23 28   Glucose 70 - 110 mg/dL 284(H) 291(H) 189(H)   Blood Urea Nitrogen 8 - 23 mg/dL 24(H) 24(H) 30(H)   Creatinine 0.5 - 1.4 mg/dL 1.1 1.1 1.2   Calcium 8.7 - 10.5 mg/dL 9.5 10.1 9.5   Total Protein 6.0 - 8.4 g/dL 7.2 7.3 -   Albumin 3.5 - 5.2 g/dL 3.7 3.9 -   Total Bilirubin 0.1 - 1.0 mg/dL 0.3 0.3 -   AST 10 - 40 U/L 16 20 -   ALT 10 - 44 U/L 22 25 -   Anion Gap 8 - 16 mmol/L 9 11 5(L)   eGFR (African American) >60 mL/min/1.73 m:2 >60.0 >60.0 56.0(A)   eGFR (Non-African American) >60 mL/min/1.73 m:2 54.3(A) 53.9(A) 48.6(A)   WBC 3.90 - 12.70 K/uL 5.24 5.97 -   RBC 4.00 - 5.40 M/uL 4.09 3.93(L) -   Hemoglobin 12.0 - 16.0 g/dL 12.1 12.0 -   Hematocrit 37.0 - 48.5 % 37.6 37.7 -   MCV 82 - 98 fL 92 96 -   MCH 27.0 - 31.0 pg 29.6 30.5 -   MCHC 32.0 - 36.0 g/dL 32.2 31.8(L) -   RDW 11.5 - 14.5 % 12.7 12.4 -   Platelets 150 - 350 K/uL 189 184 -   MPV 9.2 - 12.9 fL 11.1 11.8 -   Gran # 1.8 - 7.7 K/uL 2.9 3.6 -   Lymph # 1.0 - 4.8 K/uL 1.4 1.3 -   Mono # 0.3 - 1.0 K/uL 0.6 0.5 -   Eos # 0.0 - 0.5 K/uL 0.4 0.5 -   Baso # 0.00 - 0.20 K/uL 0.03 0.03 -   Gran % 38.0 - 73.0 % 54.5 60.0 -   Lymph % 18.0 - 48.0 % 27.3 22.3 -   Mono% 4.0 - 15.0 % 10.5 9.0 -   Eos % 0.0 - 8.0 % 6.9 7.7 -   Baso % 0.0 - 1.9 % 0.6 0.5 -   Thyroglobulin, Antibody Screen 0.0 - 3.9 IU/mL - - -   Thyroperoxidase Antibodies <6.0 IU/mL - - -           Hemoglobin A1C   Date Value Ref Range Status   08/06/2020 8.4 (H) 4.0 - 5.6 % Final     Comment:     ADA Screening Guidelines:  5.7-6.4%  Consistent with prediabetes  >or=6.5%  Consistent with diabetes  High levels of fetal hemoglobin interfere with the HbA1C  assay. Heterozygous hemoglobin variants (HbS, HgC, etc)do  not significantly interfere with this assay.   However, presence of multiple variants may affect accuracy.     11/14/2019 8.7 (H) 4.0 - 5.6 % Final     Comment:     ADA Screening Guidelines:  5.7-6.4%  Consistent with prediabetes  >or=6.5%  Consistent with diabetes  High levels  "of fetal hemoglobin interfere with the HbA1C  assay. Heterozygous hemoglobin variants (HbS, HgC, etc)do  not significantly interfere with this assay.   However, presence of multiple variants may affect accuracy.     08/25/2015 12.8 (H) 0.0 - 5.6 % Final     Comment:     Reference Interval:_5.0 - 5.6 Normal  5.7 - 6.4 High Risk  > 6.5 Diabetic  Hgb A1c results are standardized based on the (NGSP) National  Glycohemoglobin Standardization Program.  Hemoglobin A1C levels are related to mean serum/plasma glucose during the  preceding 2-3 months.           Assessment and plan:       Problem List Items Addressed This Visit        Cardiac/Vascular    Hyperlipidemia    Current Assessment & Plan     Lipid profile done with PCP. Bring record to next visit.  Continue statin.         Essential hypertension    Current Assessment & Plan     BP controlled. Continue current meds. Pt to notify provider if BP consistently more than 140/90.              Renal/    CKD (chronic kidney disease) stage 3, GFR 30-59 ml/min    Current Assessment & Plan     Most recent kidney function slightly reduced at GFR of 54.  Continue to monitor.  Avoid nephrotoxics.            Oncology    Adenoma of left adrenal gland    Current Assessment & Plan     Reviewed outside records CT from [facility] on May 7, 2019 with left benign adrenal adenoma."  The report did not describe the size or characteristics of the nodule. Biochemical workup neg. Repeat CT in 1-2 years.            Endocrine    Diabetes mellitus type 2 with complications, uncontrolled - Primary    Current Assessment & Plan     BG not at goal.  HbA1C 8.4% Aug 2020.  Increase Novolog from 15 to 20 units TID with meals. Reduce by 8-10 units if premeal blood sugar less than 100.   Add Novolog correctional insulin 2:50 BEFORE meals if blood sugar before meal more than 150.  Continue Lanuts 60 units once daily.  Continue Metformin 1000 mg BID.   Resume Victoza 0.6mg daily. Notify " "provider if BG consistently <70. Goal is to increase victoza and wean insulin.  Check BG before meals and bedtime.   Up to date with eye exam   On Ace. Check urine microalbumin at next visit.  Discussed proper foot care.  Counseled pt on low carb/low fat diet and to increase physical activity. Really needs to reduce snacking as this causes vast fluctuations in her blood sugars.  On statin           Relevant Medications    insulin (LANTUS SOLOSTAR U-100 INSULIN) glargine 100 units/mL (3mL) SubQ pen    liraglutide 0.6 mg/0.1 mL, 18 mg/3 mL, subq PNIJ (VICTOZA 2-BENJAMIN) 0.6 mg/0.1 mL (18 mg/3 mL) PnIj pen    insulin lispro 100 unit/mL pen    BD ULTRA-FINE GEENA PEN NEEDLE 32 gauge x 5/32" Ndle    metFORMIN (GLUCOPHAGE-XR) 500 MG ER 24hr tablet           RTC in 1 month  Please reschedule prior diabetic education consult. Pt states she never received an appt.        "

## 2020-10-23 NOTE — PATIENT INSTRUCTIONS
Increase Novolog to 20 units TID with meals. Reduce by 8-10 units if premeal blood sugar less than 100.     Add Novolog correctional insulin BEFORE meals if blood sugar before meal more than 150.    Resume Victoza. Notify provider if BG consistently <70.    Continue Lanuts 60 once daily.    Continue Metformin 1000 mg BID.

## 2020-10-23 NOTE — ASSESSMENT & PLAN NOTE
BG not at goal.  HbA1C 8.4% Aug 2020.  Increase Novolog from 15 to 20 units TID with meals. Reduce by 8-10 units if premeal blood sugar less than 100.   Add Novolog correctional insulin 2:50 BEFORE meals if blood sugar before meal more than 150.  Continue Lanuts 60 units once daily.  Continue Metformin 1000 mg BID.   Resume Victoza 0.6mg daily. Notify provider if BG consistently <70. Goal is to increase victoza and wean insulin.  Check BG before meals and bedtime.   Up to date with eye exam   On Ace. Check urine microalbumin at next visit.  Discussed proper foot care.  Counseled pt on low carb/low fat diet and to increase physical activity. Really needs to reduce snacking as this causes vast fluctuations in her blood sugars.  On statin

## 2020-10-23 NOTE — ASSESSMENT & PLAN NOTE
BP controlled. Continue current meds. Pt to notify provider if BP consistently more than 140/90.

## 2020-11-11 ENCOUNTER — LAB VISIT (OUTPATIENT)
Dept: LAB | Facility: HOSPITAL | Age: 60
End: 2020-11-11
Attending: INTERNAL MEDICINE
Payer: MEDICAID

## 2020-11-11 DIAGNOSIS — M54.50 LUMBAR BACK PAIN: ICD-10-CM

## 2020-11-11 DIAGNOSIS — M54.9 DORSALGIA, UNSPECIFIED: ICD-10-CM

## 2020-11-11 DIAGNOSIS — M35.00 SJOGREN'S SYNDROME, WITH UNSPECIFIED ORGAN INVOLVEMENT: ICD-10-CM

## 2020-11-11 DIAGNOSIS — M17.0 PRIMARY OSTEOARTHRITIS OF BOTH KNEES: ICD-10-CM

## 2020-11-11 DIAGNOSIS — M06.00 SERONEGATIVE RHEUMATOID ARTHRITIS: ICD-10-CM

## 2020-11-11 LAB
ALBUMIN SERPL BCP-MCNC: 4 G/DL (ref 3.5–5.2)
ALP SERPL-CCNC: 56 U/L (ref 55–135)
ALT SERPL W/O P-5'-P-CCNC: 33 U/L (ref 10–44)
ANION GAP SERPL CALC-SCNC: 8 MMOL/L (ref 8–16)
AST SERPL-CCNC: 26 U/L (ref 10–40)
BASOPHILS # BLD AUTO: 0.03 K/UL (ref 0–0.2)
BASOPHILS NFR BLD: 0.6 % (ref 0–1.9)
BILIRUB SERPL-MCNC: 0.3 MG/DL (ref 0.1–1)
BUN SERPL-MCNC: 37 MG/DL (ref 8–23)
CALCIUM SERPL-MCNC: 9.3 MG/DL (ref 8.7–10.5)
CHLORIDE SERPL-SCNC: 104 MMOL/L (ref 95–110)
CO2 SERPL-SCNC: 27 MMOL/L (ref 23–29)
CREAT SERPL-MCNC: 1.2 MG/DL (ref 0.5–1.4)
CRP SERPL-MCNC: 1.3 MG/L (ref 0–8.2)
DIFFERENTIAL METHOD: ABNORMAL
EOSINOPHIL # BLD AUTO: 0.3 K/UL (ref 0–0.5)
EOSINOPHIL NFR BLD: 6.4 % (ref 0–8)
ERYTHROCYTE [DISTWIDTH] IN BLOOD BY AUTOMATED COUNT: 12.4 % (ref 11.5–14.5)
ERYTHROCYTE [SEDIMENTATION RATE] IN BLOOD BY WESTERGREN METHOD: 7 MM/HR (ref 0–20)
EST. GFR  (AFRICAN AMERICAN): 56 ML/MIN/1.73 M^2
EST. GFR  (NON AFRICAN AMERICAN): 48.6 ML/MIN/1.73 M^2
GLUCOSE SERPL-MCNC: 186 MG/DL (ref 70–110)
HCT VFR BLD AUTO: 36.7 % (ref 37–48.5)
HGB BLD-MCNC: 11.8 G/DL (ref 12–16)
IMM GRANULOCYTES # BLD AUTO: 0.01 K/UL (ref 0–0.04)
IMM GRANULOCYTES NFR BLD AUTO: 0.2 % (ref 0–0.5)
LYMPHOCYTES # BLD AUTO: 1.4 K/UL (ref 1–4.8)
LYMPHOCYTES NFR BLD: 26.6 % (ref 18–48)
MCH RBC QN AUTO: 31.5 PG (ref 27–31)
MCHC RBC AUTO-ENTMCNC: 32.2 G/DL (ref 32–36)
MCV RBC AUTO: 98 FL (ref 82–98)
MONOCYTES # BLD AUTO: 0.6 K/UL (ref 0.3–1)
MONOCYTES NFR BLD: 10.6 % (ref 4–15)
NEUTROPHILS # BLD AUTO: 2.9 K/UL (ref 1.8–7.7)
NEUTROPHILS NFR BLD: 55.6 % (ref 38–73)
NRBC BLD-RTO: 0 /100 WBC
PLATELET # BLD AUTO: 234 K/UL (ref 150–350)
PMV BLD AUTO: 10.8 FL (ref 9.2–12.9)
POTASSIUM SERPL-SCNC: 5.2 MMOL/L (ref 3.5–5.1)
PROT SERPL-MCNC: 6.9 G/DL (ref 6–8.4)
RBC # BLD AUTO: 3.75 M/UL (ref 4–5.4)
SODIUM SERPL-SCNC: 139 MMOL/L (ref 136–145)
T3FREE SERPL-MCNC: 2.3 PG/ML (ref 2.3–4.2)
T4 FREE SERPL-MCNC: 0.77 NG/DL (ref 0.71–1.51)
TSH SERPL DL<=0.005 MIU/L-ACNC: 1.4 UIU/ML (ref 0.4–4)
WBC # BLD AUTO: 5.19 K/UL (ref 3.9–12.7)

## 2020-11-11 PROCEDURE — 86039 ANTINUCLEAR ANTIBODIES (ANA): CPT

## 2020-11-11 PROCEDURE — 85025 COMPLETE CBC W/AUTO DIFF WBC: CPT

## 2020-11-11 PROCEDURE — 85651 RBC SED RATE NONAUTOMATED: CPT | Mod: PO

## 2020-11-11 PROCEDURE — 86235 NUCLEAR ANTIGEN ANTIBODY: CPT

## 2020-11-11 PROCEDURE — 84439 ASSAY OF FREE THYROXINE: CPT

## 2020-11-11 PROCEDURE — 80053 COMPREHEN METABOLIC PANEL: CPT

## 2020-11-11 PROCEDURE — 84443 ASSAY THYROID STIM HORMONE: CPT

## 2020-11-11 PROCEDURE — 36415 COLL VENOUS BLD VENIPUNCTURE: CPT | Mod: PO

## 2020-11-11 PROCEDURE — 86235 NUCLEAR ANTIGEN ANTIBODY: CPT | Mod: 59

## 2020-11-11 PROCEDURE — 86140 C-REACTIVE PROTEIN: CPT

## 2020-11-11 PROCEDURE — 84481 FREE ASSAY (FT-3): CPT

## 2020-11-11 PROCEDURE — 86038 ANTINUCLEAR ANTIBODIES: CPT

## 2020-11-12 LAB
ANA PATTERN 1: NORMAL
ANA SER QL IF: POSITIVE
ANA TITR SER IF: NORMAL {TITER}

## 2020-11-17 LAB
ANTI SM ANTIBODY: 0.07 RATIO (ref 0–0.99)
ANTI SM/RNP ANTIBODY: 0.06 RATIO (ref 0–0.99)
ANTI-SM INTERPRETATION: NEGATIVE
ANTI-SM/RNP INTERPRETATION: NEGATIVE
ANTI-SSA ANTIBODY: 0.05 RATIO (ref 0–0.99)
ANTI-SSA ANTIBODY: 0.05 RATIO (ref 0–0.99)
ANTI-SSA INTERPRETATION: NEGATIVE
ANTI-SSA INTERPRETATION: NEGATIVE
ANTI-SSB ANTIBODY: 0.07 RATIO (ref 0–0.99)
ANTI-SSB ANTIBODY: 0.07 RATIO (ref 0–0.99)
ANTI-SSB INTERPRETATION: NEGATIVE
ANTI-SSB INTERPRETATION: NEGATIVE
DSDNA AB SER-ACNC: NORMAL [IU]/ML

## 2020-11-18 ENCOUNTER — OFFICE VISIT (OUTPATIENT)
Dept: RHEUMATOLOGY | Facility: CLINIC | Age: 60
End: 2020-11-18
Payer: MEDICAID

## 2020-11-18 VITALS
HEIGHT: 63 IN | SYSTOLIC BLOOD PRESSURE: 129 MMHG | WEIGHT: 233 LBS | HEART RATE: 81 BPM | DIASTOLIC BLOOD PRESSURE: 75 MMHG | BODY MASS INDEX: 41.29 KG/M2

## 2020-11-18 DIAGNOSIS — M47.812 CERVICAL ARTHRITIS: ICD-10-CM

## 2020-11-18 DIAGNOSIS — M06.00 SERONEGATIVE RHEUMATOID ARTHRITIS: Primary | ICD-10-CM

## 2020-11-18 DIAGNOSIS — M35.00 SJOGREN'S SYNDROME, WITH UNSPECIFIED ORGAN INVOLVEMENT: ICD-10-CM

## 2020-11-18 PROCEDURE — 99214 OFFICE O/P EST MOD 30 MIN: CPT | Mod: PBBFAC,PO | Performed by: PHYSICIAN ASSISTANT

## 2020-11-18 PROCEDURE — 99999 PR PBB SHADOW E&M-EST. PATIENT-LVL IV: ICD-10-PCS | Mod: PBBFAC,,, | Performed by: PHYSICIAN ASSISTANT

## 2020-11-18 PROCEDURE — 99999 PR PBB SHADOW E&M-EST. PATIENT-LVL IV: CPT | Mod: PBBFAC,,, | Performed by: PHYSICIAN ASSISTANT

## 2020-11-18 PROCEDURE — 99214 OFFICE O/P EST MOD 30 MIN: CPT | Mod: S$PBB,,, | Performed by: PHYSICIAN ASSISTANT

## 2020-11-18 PROCEDURE — 99214 PR OFFICE/OUTPT VISIT, EST, LEVL IV, 30-39 MIN: ICD-10-PCS | Mod: S$PBB,,, | Performed by: PHYSICIAN ASSISTANT

## 2020-11-18 RX ORDER — MELOXICAM 7.5 MG/1
7.5 TABLET ORAL DAILY
Qty: 30 TABLET | Refills: 6 | Status: SHIPPED | OUTPATIENT
Start: 2020-11-18 | End: 2021-01-28 | Stop reason: SDUPTHER

## 2020-11-18 RX ORDER — HYDROXYCHLOROQUINE SULFATE 200 MG/1
200 TABLET, FILM COATED ORAL 2 TIMES DAILY
Qty: 60 TABLET | Refills: 6 | Status: SHIPPED | OUTPATIENT
Start: 2020-11-18 | End: 2021-03-17 | Stop reason: SDUPTHER

## 2020-11-18 NOTE — PATIENT INSTRUCTIONS
Decrease mobic (meloxicam) 7.5 mg daily     Restart hydroxychloroquine 200 mg twice daily for rheumatoid arthritis

## 2020-11-18 NOTE — Clinical Note
Please fax a copy of my note to her pulmonologist Dr. Yaya Bazzi and PCP Genevieve Haywood. I can't find them in epic to CC the chart electronically

## 2020-11-23 ENCOUNTER — OFFICE VISIT (OUTPATIENT)
Dept: ENDOCRINOLOGY | Facility: CLINIC | Age: 60
End: 2020-11-23
Payer: MEDICAID

## 2020-11-23 VITALS
SYSTOLIC BLOOD PRESSURE: 124 MMHG | HEART RATE: 85 BPM | WEIGHT: 229.5 LBS | BODY MASS INDEX: 39.18 KG/M2 | OXYGEN SATURATION: 98 % | DIASTOLIC BLOOD PRESSURE: 60 MMHG | HEIGHT: 64 IN

## 2020-11-23 DIAGNOSIS — D35.02 ADENOMA OF LEFT ADRENAL GLAND: ICD-10-CM

## 2020-11-23 DIAGNOSIS — N18.31 STAGE 3A CHRONIC KIDNEY DISEASE: ICD-10-CM

## 2020-11-23 DIAGNOSIS — I10 ESSENTIAL HYPERTENSION: ICD-10-CM

## 2020-11-23 DIAGNOSIS — E78.2 MIXED HYPERLIPIDEMIA: ICD-10-CM

## 2020-11-23 PROCEDURE — 99999 PR PBB SHADOW E&M-EST. PATIENT-LVL V: CPT | Mod: PBBFAC,,, | Performed by: INTERNAL MEDICINE

## 2020-11-23 PROCEDURE — 99215 OFFICE O/P EST HI 40 MIN: CPT | Mod: PBBFAC,PO | Performed by: INTERNAL MEDICINE

## 2020-11-23 PROCEDURE — 99214 PR OFFICE/OUTPT VISIT, EST, LEVL IV, 30-39 MIN: ICD-10-PCS | Mod: S$PBB,,, | Performed by: INTERNAL MEDICINE

## 2020-11-23 PROCEDURE — 99214 OFFICE O/P EST MOD 30 MIN: CPT | Mod: S$PBB,,, | Performed by: INTERNAL MEDICINE

## 2020-11-23 PROCEDURE — 99999 PR PBB SHADOW E&M-EST. PATIENT-LVL V: ICD-10-PCS | Mod: PBBFAC,,, | Performed by: INTERNAL MEDICINE

## 2020-11-23 NOTE — PROGRESS NOTES
Subjective:    Patient ID:  Patricia Fournier is a 60 y.o. female.    Chief Complaint:  Diabetes      Pt presents to follow up for T2 DM and L adrenal adenoma.     PCP is Dr. Genevieve Haywood    With regards to the diabetes:     Onset of Type 2 diabetes mellitus was 20 years ago. Started off with Metformin. Also has RA. No recent steroids.     Known diabetic complications: none  History of DKA: no  Cardiovascular risk factors: advanced age (older than 55 for men, 65 for women), diabetes mellitus, dyslipidemia and hypertension    Currently taking:  Novolog 20 units TID with meals. Reduce by 8-10 units if premeal blood sugar less than 100.   Novolog correctional insulin BEFORE meals if blood sugar before meal more than 150.  Victoza 1.2  Lanuts 60 once daily.  Metformin 1000 mg BID.     Took Trulicity in the past and had hypoglycemia. BG was 40. She passed out, had ? seizure and went to the hospital. She was also taking insulin at the time. No history of pancreatitis.     Did not meet with diabetic education yet.     Home blood sugar records:   Fasting 111-250  Pre-lunch   Pre-dinner   Bedtime      Current diet: on average, 2-3 meals per day. Sometimes skips breakfast. Notes she has bad cravings for sugars. Smoked in the past and now has cravings for sugar, especially at night. Previously snacked a lot at night- sandwich on white bread, fruit (grapes). Has changed snacks to sugar free jello and pudding, and popcorn at night.   Current exercise: no regular exercise, sometimes does yard work  Any episodes of hypoglycemia? no  Last eye exam: 2-3 months ago. No DR per pt.     Patient notes she was having GI symptoms and had CT of her abdomen May of last year.  She was found to have a left adrenal adenoma.  The report does not describe the size or characteristics of the nodule.  She does have hypertension but symptoms are well controlled with 2 medications lisinopril and hydrochlorothiazide.  No violaceous  striae or buffalo hump. She denies voice deepening, clitoralmegaly episodic hypertension/diaphoresis/ flushing. Biochemical work up was normal.       Reduced kidney function.stable. had trended down slightly from gfr of 50's to now 48.        Diabetes Management Status    Statin: Taking  ACE/ARB: Taking    Screening or Prevention Patient's value Goal Complete/Controlled?   HgA1C Testing and Control   Lab Results   Component Value Date    HGBA1C 8.4 (H) 08/06/2020      Annually/Less than 8% No   Lipid profile : 06/15/2020 Annually No   LDL control Lab Results   Component Value Date    LDLCALC 117 08/25/2015    Annually/Less than 100 mg/dl  No   Nephropathy screening Lab Results   Component Value Date    LABMICR < 0.6 08/25/2015     No results found for: PROTEINUA Annually No   Blood pressure BP Readings from Last 1 Encounters:   11/23/20 124/60    Less than 140/90 Yes   Dilated retinal exam Most Recent Eye Exam Date: Not Found Annually No   Foot exam   : 10/23/2020 Annually Yes     Review of Systems   Constitutional: Negative for fatigue and unexpected weight change.   Eyes: Negative for visual disturbance.   Respiratory: Negative for shortness of breath.    Cardiovascular: Negative for chest pain and leg swelling.   Gastrointestinal: Negative for abdominal pain.   Endocrine: Negative for polydipsia, polyphagia and polyuria.   Musculoskeletal: Negative for myalgias.   Skin: Positive for wound.   Neurological: Positive for numbness. Negative for headaches.   Psychiatric/Behavioral: Positive for sleep disturbance.        Past Medical History:   Diagnosis Date    Diabetes mellitus, type 2     GERD (gastroesophageal reflux disease)     Hypertension       Social History     Tobacco Use    Smoking status: Former Smoker     Packs/day: 1.50     Years: 20.00     Pack years: 30.00    Smokeless tobacco: Former User     Quit date: 2/20/2002   Substance Use Topics    Alcohol use: No     Alcohol/week: 0.0 standard drinks     "Drug use: No     Family History   Problem Relation Age of Onset    Heart disease Mother     Diabetes Mother     Cancer Father     Diabetes Sister     Diabetes Brother     Diabetes Maternal Grandmother       Past Surgical History:   Procedure Laterality Date     SECTION      gallbladder removed      TUBAL LIGATION            Current Outpatient Medications:     ACCU-CHEK GUIDE Strp, Check blood sugar four times daily., Disp: 400 strip, Rfl: 3    BD ULTRA-FINE GEENA PEN NEEDLE 32 gauge x 5/32" Ndle, Use 4x daily with insulin., Disp: 400 each, Rfl: 3    chlorthalidone (HYGROTEN) 50 MG Tab, Take 50 mg by mouth once daily. , Disp: , Rfl:     FLUoxetine 20 MG capsule, Take 20 mg by mouth., Disp: , Rfl:     fluticasone propionate (FLONASE) 50 mcg/actuation nasal spray, 2 sprays by Each Nostril route once daily., Disp: , Rfl: 2    hydrOXYchloroQUINE (PLAQUENIL) 200 mg tablet, Take 1 tablet (200 mg total) by mouth 2 (two) times daily., Disp: 60 tablet, Rfl: 6    insulin (LANTUS SOLOSTAR U-100 INSULIN) glargine 100 units/mL (3mL) SubQ pen, Inject 60 Units into the skin once daily., Disp: 3 Box, Rfl: 3    insulin lispro 100 unit/mL pen, Take 20 units subq TID with meals. Reduce by 8-10 units if premeal blood glucose less than 100. Add SSI 2:50 if premeal BG >150. TDD not to exceed 80 units, Disp: 4 Box, Rfl: 4    insulin lispro 200 unit/mL (3 mL) InPn, Inject into the skin 3 (three) times daily before meals., Disp: , Rfl:     linaCLOtide (LINZESS) 72 mcg Cap capsule, Take 72 mcg by mouth., Disp: , Rfl:     liraglutide 0.6 mg/0.1 mL, 18 mg/3 mL, subq PNIJ (VICTOZA 2-BENJAMIN) 0.6 mg/0.1 mL (18 mg/3 mL) PnIj pen, Inject 1.8 mg into the skin daily., Disp: 9 mL, Rfl: 3    lisinopriL (PRINIVIL,ZESTRIL) 20 MG tablet, Take 1 tablet (20 mg total) by mouth once daily., Disp: 90 tablet, Rfl: 3    meloxicam (MOBIC) 7.5 MG tablet, Take 1 tablet (7.5 mg total) by mouth once daily., Disp: 30 tablet, Rfl: 6    " "metFORMIN (GLUCOPHAGE-XR) 500 MG ER 24hr tablet, Take 2 tablets (1,000 mg total) by mouth 2 (two) times daily with meals., Disp: 360 tablet, Rfl: 3    miscellaneous medical supply (C-TUB) Misc, Diabetic shoes with full-length semi-rigid accommadative  Orthotics posting medial heel DX E11.9, [place]. Pes planus., Disp: , Rfl:     ondansetron (ZOFRAN-ODT) 4 MG TbDL, Take 1 tablet (4 mg total) by mouth every 6 (six) hours as needed., Disp: 30 tablet, Rfl: 0    pantoprazole (PROTONIX) 40 MG tablet, Take 40 mg by mouth., Disp: , Rfl:     pravastatin (PRAVACHOL) 40 MG tablet, Take 40 mg by mouth once daily. , Disp: , Rfl:      Review of patient's allergies indicates:   Allergen Reactions    Allspice Hives     Pt states allergic to pimento( which is part of allspice family)    Trulicity [dulaglutide] Other (See Comments)     Severe hypoglycemic and hospitalized        Objective:   /60   Pulse 85   Ht 5' 3.5" (1.613 m)   Wt 104.1 kg (229 lb 8 oz)   SpO2 98%   BMI 40.02 kg/m²   BP Readings from Last 3 Encounters:   11/23/20 124/60   11/18/20 129/75   10/23/20 120/68     Wt Readings from Last 3 Encounters:   11/23/20 0929 104.1 kg (229 lb 8 oz)   11/18/20 0838 105.7 kg (233 lb)   10/23/20 1004 103 kg (227 lb 2.9 oz)          Physical Exam  Vitals signs reviewed.   Constitutional:       General: She is not in acute distress.     Appearance: Normal appearance. She is well-developed. She is not ill-appearing, toxic-appearing or diaphoretic.   HENT:      Head: Normocephalic and atraumatic.      Right Ear: External ear normal.      Left Ear: External ear normal.   Eyes:      General: No scleral icterus.  Neck:      Trachea: No tracheal deviation.   Pulmonary:      Effort: Pulmonary effort is normal. No respiratory distress.   Abdominal:      General: There is no distension.   Neurological:      General: No focal deficit present.      Mental Status: She is alert and oriented to person, place, and time.   Psychiatric:   "       Thought Content: Thought content normal.           Lab Results   Component Value Date     11/11/2020     (L) 08/25/2015    K 5.2 (H) 11/11/2020    K 4.3 08/25/2015     11/11/2020     08/25/2015    CO2 27 11/11/2020    BUN 37 (H) 11/11/2020    CREATININE 1.2 11/11/2020    CREATININE 0.52 08/25/2015     (H) 11/11/2020    HGBA1C 8.4 (H) 08/06/2020    AST 26 11/11/2020    ALT 33 11/11/2020    ALBUMIN 4.0 11/11/2020    ALBUMIN 3.9 08/25/2015    PROT 6.9 11/11/2020    BILITOT 0.3 11/11/2020    WBC 5.19 11/11/2020    HGB 11.8 (L) 11/11/2020    HCT 36.7 (L) 11/11/2020    MCV 98 11/11/2020    MCH 31.5 (H) 11/11/2020     11/11/2020    MPV 10.8 11/11/2020    GRAN 2.9 11/11/2020    GRAN 55.6 11/11/2020    LYMPH 1.4 11/11/2020    LYMPH 26.6 11/11/2020    CHOL 211 08/25/2015    HDL 43 08/25/2015    LDLCALC 117 08/25/2015    TRIG 256 (H) 08/25/2015       Lab Results   Component Value Date    TSH 1.400 11/11/2020    FREET4 0.77 11/11/2020        Thyroid Labs Latest Ref Rng & Units 7/9/2020 8/6/2020 11/11/2020   TSH 0.400 - 4.000 uIU/mL - - 1.400   Free T4 0.71 - 1.51 ng/dL - - 0.77   Sodium 136 - 145 mmol/L 137 139 139   Potassium 3.5 - 5.1 mmol/L 4.6 4.9 5.2(H)   Chloride 95 - 110 mmol/L 103 106 104   Carbon Dioxide 23 - 29 mmol/L 23 28 27   Glucose 70 - 110 mg/dL 291(H) 189(H) 186(H)   Blood Urea Nitrogen 8 - 23 mg/dL 24(H) 30(H) 37(H)   Creatinine 0.5 - 1.4 mg/dL 1.1 1.2 1.2   Calcium 8.7 - 10.5 mg/dL 10.1 9.5 9.3   Total Protein 6.0 - 8.4 g/dL 7.3 - 6.9   Albumin 3.5 - 5.2 g/dL 3.9 - 4.0   Total Bilirubin 0.1 - 1.0 mg/dL 0.3 - 0.3   AST 10 - 40 U/L 20 - 26   ALT 10 - 44 U/L 25 - 33   Anion Gap 8 - 16 mmol/L 11 5(L) 8   eGFR (African American) >60 mL/min/1.73 m:2 >60.0 56.0(A) 56.0(A)   eGFR (Non-African American) >60 mL/min/1.73 m:2 53.9(A) 48.6(A) 48.6(A)   WBC 3.90 - 12.70 K/uL 5.97 - 5.19   RBC 4.00 - 5.40 M/uL 3.93(L) - 3.75(L)   Hemoglobin 12.0 - 16.0 g/dL 12.0 - 11.8(L)    Hematocrit 37.0 - 48.5 % 37.7 - 36.7(L)   MCV 82 - 98 fL 96 - 98   MCH 27.0 - 31.0 pg 30.5 - 31.5(H)   MCHC 32.0 - 36.0 g/dL 31.8(L) - 32.2   RDW 11.5 - 14.5 % 12.4 - 12.4   Platelets 150 - 350 K/uL 184 - 234   MPV 9.2 - 12.9 fL 11.8 - 10.8   Gran # 1.8 - 7.7 K/uL 3.6 - 2.9   Lymph # 1.0 - 4.8 K/uL 1.3 - 1.4   Mono # 0.3 - 1.0 K/uL 0.5 - 0.6   Eos # 0.0 - 0.5 K/uL 0.5 - 0.3   Baso # 0.00 - 0.20 K/uL 0.03 - 0.03   Gran % 38.0 - 73.0 % 60.0 - 55.6   Lymph % 18.0 - 48.0 % 22.3 - 26.6   Mono% 4.0 - 15.0 % 9.0 - 10.6   Eos % 0.0 - 8.0 % 7.7 - 6.4   Baso % 0.0 - 1.9 % 0.5 - 0.6   Thyroglobulin, Antibody Screen 0.0 - 3.9 IU/mL - - -   Thyroperoxidase Antibodies <6.0 IU/mL - - -           Hemoglobin A1C   Date Value Ref Range Status   08/06/2020 8.4 (H) 4.0 - 5.6 % Final     Comment:     ADA Screening Guidelines:  5.7-6.4%  Consistent with prediabetes  >or=6.5%  Consistent with diabetes  High levels of fetal hemoglobin interfere with the HbA1C  assay. Heterozygous hemoglobin variants (HbS, HgC, etc)do  not significantly interfere with this assay.   However, presence of multiple variants may affect accuracy.     11/14/2019 8.7 (H) 4.0 - 5.6 % Final     Comment:     ADA Screening Guidelines:  5.7-6.4%  Consistent with prediabetes  >or=6.5%  Consistent with diabetes  High levels of fetal hemoglobin interfere with the HbA1C  assay. Heterozygous hemoglobin variants (HbS, HgC, etc)do  not significantly interfere with this assay.   However, presence of multiple variants may affect accuracy.     08/25/2015 12.8 (H) 0.0 - 5.6 % Final     Comment:     Reference Interval:_5.0 - 5.6 Normal  5.7 - 6.4 High Risk  > 6.5 Diabetic  Hgb A1c results are standardized based on the (NGSP) National  Glycohemoglobin Standardization Program.  Hemoglobin A1C levels are related to mean serum/plasma glucose during the  preceding 2-3 months.           Assessment and plan:       Problem List Items Addressed This Visit        Cardiac/Vascular     "Hyperlipidemia    Current Assessment & Plan     Lipid profile done with PCP. Bring record to next visit. If not, will plan to recheck here. Continue statin.         Essential hypertension    Current Assessment & Plan     BP controlled. Continue current meds. Pt to notify PCP if BP consistently more than 140/90.                Renal/    CKD (chronic kidney disease) stage 3, GFR 30-59 ml/min    Current Assessment & Plan     GFR reduced but stable at 48. Continue to monitor. Avoid nephrotoxics.            Oncology    Adenoma of left adrenal gland    Current Assessment & Plan     Reviewed outside records CT from [facility] on May 7, 2019 with left benign adrenal adenoma."  The report did not describe the size or characteristics of the nodule. Biochemical workup neg. Repeat CT in 1-2 years.            Endocrine    Diabetes mellitus type 2 with complications, uncontrolled - Primary    Current Assessment & Plan     BG improved but not at goal.  HbA1C 8.4% Aug 2020.  Continue Novolog 20 units TID with meals. Reduce by 8-10 units if premeal blood sugar less than 100.   Continue Novolog correctional insulin 2:50 BEFORE meals if blood sugar before meal more than 150.  Continue Lanuts 60 units once daily.  Continue Metformin 1000 mg BID.   Increase Victoza to 1.8 mg daily. Notify provider if BG consistently <70. Goal is to increase victoza and wean insulin.  Check BG before meals and bedtime.   Up to date with eye exam   On Ace. Check urine microalbumin.  Discussed proper foot care.  Counseled pt on low carb/low fat diet and to increase physical activity. Really needs to reduce snacking as this causes vast fluctuations in her blood sugars.  On statin           Relevant Medications    liraglutide 0.6 mg/0.1 mL, 18 mg/3 mL, subq PNIJ (VICTOZA 2-BENJAMIN) 0.6 mg/0.1 mL (18 mg/3 mL) PnIj pen           RTC in 1 month    Please reschedule prior diabetic education consult. Pt states she never received an appt.        "

## 2020-12-02 NOTE — ASSESSMENT & PLAN NOTE
Lipid profile done with PCP. Bring record to next visit. If not, will plan to recheck here. Continue statin.

## 2020-12-02 NOTE — ASSESSMENT & PLAN NOTE
BG improved but not at goal.  HbA1C 8.4% Aug 2020.  Continue Novolog 20 units TID with meals. Reduce by 8-10 units if premeal blood sugar less than 100.   Continue Novolog correctional insulin 2:50 BEFORE meals if blood sugar before meal more than 150.  Continue Lanuts 60 units once daily.  Continue Metformin 1000 mg BID.   Increase Victoza to 1.8 mg daily. Notify provider if BG consistently <70. Goal is to increase victoza and wean insulin.  Check BG before meals and bedtime.   Up to date with eye exam   On Ace. Check urine microalbumin.  Discussed proper foot care.  Counseled pt on low carb/low fat diet and to increase physical activity. Really needs to reduce snacking as this causes vast fluctuations in her blood sugars.  On statin

## 2020-12-02 NOTE — ASSESSMENT & PLAN NOTE
"Reviewed outside records CT from [facility] on May 7, 2019 with left benign adrenal adenoma."  The report did not describe the size or characteristics of the nodule. Biochemical workup neg. Repeat CT in 1-2 years.  "

## 2020-12-04 ENCOUNTER — TELEPHONE (OUTPATIENT)
Dept: PEDIATRICS | Facility: CLINIC | Age: 60
End: 2020-12-04

## 2020-12-04 NOTE — TELEPHONE ENCOUNTER
----- Message from Lottie Hogan sent at 12/4/2020  2:51 PM CST -----  Contact: self  Type: Needs Medical Advice  Who Called:  patient     Best Call Back Number: [phone]  Additional Information: patient requesting a return call concerning the refill on Humalog Pen, she states [facility] will not fill states she refilled it on 11/14/2020, in which she states she did not have filled, on a prescription they received in October,

## 2020-12-04 NOTE — TELEPHONE ENCOUNTER
----- Message from Jennifer Feliciano sent at 12/4/2020  1:46 PM CST -----  Type: Needs Medical Advice  Who Called: patient  Best Call Back Number: [phone] (work)  Additional Information: the pt needs a nurse to call her back asap please in regards to Humalog pen she is out and she needs a rx called in but she needs clarity first

## 2020-12-04 NOTE — TELEPHONE ENCOUNTER
Pharmacy states that pt can't p/u Humalog from pharmacy because it needs to be OVERRRIDE. States that the way it was written makes it to be early for pt. To p/u. You sent this rx on 10/23 to [facility]. Please advise

## 2020-12-06 NOTE — TELEPHONE ENCOUNTER
Please contact pharmacy regarding script for novolog and verify which drug pt is taking, U100 or U200. Delete which ever script the pt is not taking from the med list.     If needed, forwarded me the appropriate script with the same sig (Take 20 units subq TID with meals. Reduce by 8-10 units if premeal blood glucose less than 100. Add SSI 2:50 if premeal BG >150. TDD not to exceed 80 units)  But change quantity if needed for me to sign.      Otherwise, I suspect pharmacy may not understand she is taking scheduled 20 units before each meal and sliding scale insulin.

## 2020-12-07 NOTE — TELEPHONE ENCOUNTER
Spoke w/ [facility], the pt is trying to fill the Humalog 100 too soon.  Not due until 12/10 (even though new Rx was sent 10/23 - she rec'd 125 day supply in August and the ins will not override it).  Pt said she is not out of medication and OK waiting until the 10th (3 days from now) per the pharmacist.

## 2020-12-08 ENCOUNTER — CLINICAL SUPPORT (OUTPATIENT)
Dept: DIABETES | Facility: CLINIC | Age: 60
End: 2020-12-08
Payer: MEDICAID

## 2020-12-08 VITALS — WEIGHT: 231.25 LBS | BODY MASS INDEX: 40.32 KG/M2

## 2020-12-08 PROCEDURE — 99999 PR PBB SHADOW E&M-EST. PATIENT-LVL II: ICD-10-PCS | Mod: PBBFAC,,, | Performed by: DIETITIAN, REGISTERED

## 2020-12-08 PROCEDURE — 99999 PR PBB SHADOW E&M-EST. PATIENT-LVL II: CPT | Mod: PBBFAC,,, | Performed by: DIETITIAN, REGISTERED

## 2020-12-08 PROCEDURE — 99212 OFFICE O/P EST SF 10 MIN: CPT | Mod: PBBFAC,PO | Performed by: DIETITIAN, REGISTERED

## 2020-12-08 PROCEDURE — G0108 DIAB MANAGE TRN  PER INDIV: HCPCS | Mod: PBBFAC,PO | Performed by: DIETITIAN, REGISTERED

## 2020-12-08 NOTE — PROGRESS NOTES
Diabetes Care Specialist Progress Note  Author: Rupal Sinha RD, CDE  Date: 12/8/2020    Program Intake  Reason for Diabetes Program Visit:: Initial Diabetes Assessment  Current diabetes risk level:: moderate  In the last 12 months, have you:: none  Permission to speak with others about care:: no    Clinical    Patient Health Rating  Compared to other people your age, how would you rate your health?: Fair    Problem Review  Reviewed Problem List with Patient: yes  Active comorbidities affecting diabetes self-care.: yes  Comorbidities: Chronic Kidney Disease  Reviewed health maintenance: yes    Clinical Assessment  Current Diabetes Treatment: Oral Medication, Injectable, Insulin(Metformin 1000 mg BID, Victoza 1.8 mg injection daily, Lantus 60 units QPM, Novolog 20 units AC (ran out of Novolog 12/4/20 and has not been taking))  Have you ever experienced hypoglycemia (low blood sugar)?: yes  In the last month, how often have you experienced low blood sugar?: other (see comments)(Long time ago had a hypoglycemic episode after initiating Trulicity)  Are you able to tell when your blood sugar is low?: Yes  What symptoms do you experience?: Shaky, Sweaty  Have you ever been hospitalized because your blood sugar was too low?: yes (see comments)(Patient reports many years ago hospitalized with seizure/glucose in the 40s after initiating Trulicity in addition to insulin and other diabetic medications)  How do you treat hypoglycemia (low blood sugar)?: 1/2 can soda/fruit juice    Medication Information  How do you obtain your medications?: Patient drives  How many days a week do you miss your medications?: Never  Do you use a pill box or medication chart to help you manage your medications?: Pill box  Do you sometimes have difficulty refilling your medications?: No  Medication adherence impacting ability to self-manage diabetes?: No    Labs  Do you have regular lab work to monitor your medications?: Yes  Type of Regular  Lab Work: A1c  Lab Compliance Barriers: No    Nutritional Status  Diet: Regular  Change in appetite?: No  Dentation:: Intact  Recent Changes in Weight: No Recent Weight Change  Current nutritional status an area of need that is impacting patient's ability to self-manage diabetes?: No    Additional Social History    Support  Does anyone support you with your diabetes care?: no  Who takes you to your medical appointments?: patient  Does the current primary caregiver meet the patient's needs?: Yes  Is Caregiver an area of support impacting ability to self-manage diabetes?: No    Access to Mass Media & Technology  Does the patient have access to any of the following devices or technologies?: Smart phone  Media or technology needs impacting ability to self-manage diabetes?: No    Cognitive/Behavioral Health  Alert and Oriented: Yes  Difficulty Thinking: No  Requires Prompting: No  Requires assistance for routine expression?: No  Cognitive or behavioral barriers impacting ability to self-manage diabetes?: No    Culture/Judaism  Culture or Pentecostal beliefs that may impact ability to access healthcare: No    Communication  Language preference: English  Hearing Problems: No  Vision Problems: No  Communication needs impacting ability to self-manage diabetes?: No    Health Literacy  Preferred Learning Method: Face to Face, Reading Materials  How often do you need to have someone help you read instructions, pamphlets, or written material from your doctor or pharmacy?: Never  Health literacy needs impacting ability to self-manage diabetes?: No    Diabetes Self-Management Skills Assessment    Diabetes Disease Process/Treatment Options  Patient/caregiver able to state what happens when someone has diabetes.: yes  Patient/caregiver knows what type of diabetes they have.: yes  Diabetes Type : Type II  Patient/caregiver able to identify at least three signs and symptoms of diabetes.: yes  Identified signs and symptoms:: fatigue,  frequent urination, increased thirst  Patient able to identify at least three risk factors for diabetes.: yes  Identified risk factors:: being overweight, family history, reduced activity  Diabetes Disease Process/Treatment Options: Skills Assessment Completed: Yes  Assessment indicates:: Adequate understanding  Area of need?: No    Nutrition/Healthy Eating  Challenges to healthy eating:: portion control, snacking between meals and at night, other (see comments)(Reports craving sweet foods)  Method of carbohydrate measurement:: no method  Patient can identify foods that impact blood sugar.: yes  Patient-identified foods:: soda, starchy vegetables (corn, peas, beans), starches (bread, pasta, rice, cereal), sweets(Was unaware that fruit and milk impacted glucose levels)  Nutrition/Healthy Eating Skills Assessment Completed:: Yes  Assessment indicates:: Instruction Needed  Area of need?: Yes    Physical Activity/Exercise  Patient's daily activity level:: sedentary  Patient formally exercises outside of work.: no  Reasons for not exercising:: other (see comments)(Some limitations with physical activity due to joint pain but feels she can walk for short amounts of time)  Patient can identify forms of physical activity.: yes  Stated forms of physical activity:: any movement performed by muscles that uses energy, moving to burn calories  Patient can identify reasons why exercise/physical activity is important in diabetes management.: no  Physical Activity/Exercise Skills Assessment Completed: : Yes  Assessment indicates:: Instruction Needed  Area of need?: Yes    Medications  Patient is able to describe current diabetes management routine.: yes  Diabetes management routine:: injectable medications, insulin, oral medications(Metformin, Victoza, Lantus, Novolog (currently out and not taking))  Patient is able to identify current diabetes medications, dosages, and appropriate timing of medications.: yes  Patient understands  the purpose of the medications taken for diabetes.: yes  Patient reports problems or concerns with current medication regimen.: no  Medication Skills Assessment Completed:: Yes  Assessment indicates:: Adequate understanding  Area of need?: No    Home Blood Glucose Monitoring  Patient states that blood sugar is checked at home daily.: yes  Monitoring Method:: home glucometer  Home glucometer meter type:: Accu-chek Guide--reports it is approximately 1 year old.  Has adequate glucose testing supplies at home.  How often do you check your blood sugar?: 4 times a day  When do you check your blood sugar?: Before breakfast, Before lunch, Before dinner, Before bedtime  When you check what is your typical blood sugar range? : (See glucose log attached--glucose levels improved since seeing Endocirnology 11/23/20--reviewed by Dr. Sandifer and patient OK to discontinue Novolog.  Continue Lantus, metformin, Victoza.)  Blood glucose logs:: yes  Blood glucose logs reviewed today?: yes  Home Blood Glucose Monitoring Skills Assessment Completed: : Yes  Assessment indicates:: Adequate understanding  Area of need?: No    Acute Complications  Patient is able to identify types of acute complications: Yes  Patient Identified:: Hypoglycemia  Patient is able to state the basic meaning of hypoglycemia?: Yes  Able to state the blood sugar range for hypoglycemia?: no (see comments)(Reviewed definition of hypoglycemia being glucose < 70 mg/dL--patient was treating glucose levels in the 110s.)  Patient can identify general symptoms of hypoglycemia: yes  Patient identified:: shakiness, sweating  Able to state proper treatment of hypoglycemia?: yes  Patient identified:: 1/2 can soda/fruit juice  Acute Complications Skills Assessment Completed: : Yes  Assessment indicates:: Instruction Needed  Area of need?: Yes    Chronic Complications  Chronic Complications Skills Assessment Completed: : No  Deferred due to:: Time  Area of need?:  No    Psychosocial/Coping  Patient can identify ways of coping with chronic disease.: yes  Patient-stated ways of coping with chronic disease:: support from loved ones  Psychosocial/Coping Skills Assessment Completed: : Yes  Assessment indicates:: Adequate understanding  Area of need?: No    Assessment Summary and Plan  Based on today's diabetes care assessment, the following areas of need were identified:      Social 12/8/2020   Support No   Access to Mass Media/Tech No   Cognitive/Behavioral Health No   Culture/Pentecostal No   Communication No   Health Literacy No        Clinical 12/8/2020   Medication Adherence No   Lab Compliance No   Nutritional Status No        Diabetes Self-Management Skills 12/8/2020   Diabetes Disease Process/Treatment Options No   Nutrition/Healthy Eating Yes--see care planning created   Physical Activity/Exercise Yes--see care planning created   Medication No   Home Blood Glucose Monitoring No   Acute Complications Yes--written information provided to patient on proper ways to treat hypoglycemia.     Chronic Complications No   Psychosocial/Coping No      Diabetes Self-Management Care Plan:    Today's Self-Management Care Plan was developed with Patricia's input. Patricia   has agreed to work toward these goals to improve his/her overall diabetes control.      Care Plan: Diabetes Management   Updates made since 12/8/2020 12:00 AM      Problem: Acute Complications       Goal: Patient agrees to identify and manage signs and symptoms of low blood sugar (hypoglycemia) by keeping a log of events and using proper treatment. Completed 12/8/2020   Priority: Medium   Barriers: No Barriers Identified   Note:    Reviewed signs and symptoms of hypoglycemia (glucose < 70) and proper methods to treat hypoglycemic events if occur.  15-15 rule: patient to eat 15 gm simple, concentrated CHO (such as 4 glucose tablets, 4 oz fruit juice/regular soda, or 1 Tbsp honey/sugar) and wait 15 minutes and recheck home  glucose.  Written information provided to patient.  Patient to call if more than 2 hypoglycemic events occur.        Task: Reviewed what range is considered to be hypoglycemia (low blood sugar) 70mg/dl or below and when to seek further medical attention. Completed 12/8/2020      Task: Reviewed signs and symptoms of hypoglycemia. Completed 12/8/2020      Task: Reviewed common causes and precautions to help prevent hypoglycemic events. Completed 12/8/2020      Task: Reviewed proper treatment of hypoglycemia with rule of 15. Completed 12/8/2020      Task: Reviewed 15g simple carbohydrate sources appropriate for treating hypoglycemia (4 glucose tablets, 1 glucose gel, 5 pieces hard candy, ½ cup fruit juice, ½ can regular soda, etc). Completed 12/8/2020      Task: Reviewed insulin pump troubleshooting for hypoglycemia - try to identify cause, suspending basal, treating hypo, and testing more frequently. Completed 12/8/2020      Task: Discussed sick day planning. Completed 12/8/2020      Task: Discussed natural disaster planning and/or travel planning to prevent hypoglycemia. Completed 12/8/2020      Goal: Patient agrees to check and record blood sugars 2 times day. Transitioning off Novolog so instructed OK to reduce frequency of checking form 4 times daily to 2 times daily.  Instructed to check glucose fasting and either before dinner or at bedtime. Completed 12/8/2020   Priority: Medium      Task: Provided patient with a meter today and sent Rx request to provider to send to patients pharmacy. Completed 12/8/2020      Task: Reviewed the importance of self-monitoring blood glucose and keeping logs. Completed 12/8/2020      Task: Instructed on how to use self-monitor blood glucose using a home glucometer, how to properly dispose of used strips and lancets after use, and how to appropriately store meter and supplies. Completed 12/8/2020      Task: Provided patient with blood glucose logs, reviewed appropriate timing  and frequency to SMBG, education on parameters on when to notify provider and advised patient to bring logs to all appts with PCP/Endocrinologist/Diabetes Care Specialist. Completed 12/8/2020      Task: Discussed ways to minimize pain when monitoring blood glucose. Completed 12/8/2020               Problem: Healthy Eating       Goal: Eat 3 meals daily with 30-45gm/2-3 servings of Carbohydrate per meal.    Start Date: 12/8/2020   Expected End Date: 3/8/2021   Priority: Medium   Barriers: No Barriers Identified      Task: Reviewed the role of Carbohydrate, Protein, and Fat in a health eating plan and how each nutritent impacts blood sugar. Completed 12/8/2020      Task: Reviewed Blood Sugar Goals after eating and explained how carbohydrate sources, nonstarchy vegetables, protein and fat affects blood sugar control. Completed 12/8/2020      Task: Reviewed sources of Carbohydrate: Starch, Milk, Fruit, Sugar, and nonstarchy vegetables Completed 12/8/2020      Task: Discussed the importance of balancing carbohydrates with each meal using portion control techniques to count carbohydrate grams or servings, label reading to identify servings size and amount of total carbohydrate per serving, the plate method, other Completed 12/8/2020      Task: Provided visual examples using dry measuring cups, food models, and other familiar objects such as computer mouse, deck or cards, tennis ball etc. to help with visualization of portions. Completed 12/8/2020      Task: Explained how to count carbohydrates using the food label and the use of dry measuring cups for accurate carb counting. Completed 12/8/2020      Task: Discussed strategies for choosing healthier menu options when dining out. Completed 12/8/2020      Problem: Physical Activity and Exercise       Goal: Patient agrees to increase regular physical activity 3 times per week starting at 10-15 minutes of walking and increasing to 30 minutes as tolerated.    Start Date:  12/8/2020   Expected End Date: 3/8/2021   Priority: Medium   Barriers: Physical Limitations   Note:    Will start walking in neighborhood--worried about joint pain but will start with short distances and try to increase as tolerated.       Task: Discussed role of physical activity on reducing insulin resistance and improvement in overall glycemic contro Completed 12/8/2020      Task: Discussed role of physical activity as it relates to weight loss Completed 12/8/2020      Task: Offered suggestions on how patient could increase their regular physical activity Completed 12/8/2020      Task: Reviewed blood glucose monitoring before, during and after exercise/activity Completed 12/8/2020        Today's self-management plan was reviewed, and Patricia verbalized understanding of the care plan, goals, and all of today's instructions.       The patient was encouraged to communicate with his physician and care team regarding@ condition(s) and treatment.  I provided the patient with my contact information today and encouraged to contact me via phone or patient portal as needed.     Follow Up  Follow up in about 3 months (around 3/8/2021).    Time Tracking  Patient Contact:60      Total Time: 60

## 2020-12-21 ENCOUNTER — OFFICE VISIT (OUTPATIENT)
Dept: ENDOCRINOLOGY | Facility: CLINIC | Age: 60
End: 2020-12-21
Payer: MEDICAID

## 2020-12-21 VITALS
HEIGHT: 64 IN | HEART RATE: 93 BPM | BODY MASS INDEX: 39.31 KG/M2 | DIASTOLIC BLOOD PRESSURE: 72 MMHG | OXYGEN SATURATION: 99 % | WEIGHT: 230.25 LBS | SYSTOLIC BLOOD PRESSURE: 116 MMHG

## 2020-12-21 DIAGNOSIS — D35.02 ADENOMA OF LEFT ADRENAL GLAND: ICD-10-CM

## 2020-12-21 DIAGNOSIS — E66.01 CLASS 3 SEVERE OBESITY DUE TO EXCESS CALORIES WITH SERIOUS COMORBIDITY AND BODY MASS INDEX (BMI) OF 40.0 TO 44.9 IN ADULT: ICD-10-CM

## 2020-12-21 DIAGNOSIS — N18.31 STAGE 3A CHRONIC KIDNEY DISEASE: ICD-10-CM

## 2020-12-21 DIAGNOSIS — E78.2 MIXED HYPERLIPIDEMIA: ICD-10-CM

## 2020-12-21 DIAGNOSIS — I10 ESSENTIAL HYPERTENSION: ICD-10-CM

## 2020-12-21 PROBLEM — E66.813 CLASS 3 SEVERE OBESITY DUE TO EXCESS CALORIES WITH SERIOUS COMORBIDITY AND BODY MASS INDEX (BMI) OF 40.0 TO 44.9 IN ADULT: Status: ACTIVE | Noted: 2020-12-21

## 2020-12-21 PROCEDURE — 99999 PR PBB SHADOW E&M-EST. PATIENT-LVL IV: CPT | Mod: PBBFAC,,, | Performed by: INTERNAL MEDICINE

## 2020-12-21 PROCEDURE — 99214 OFFICE O/P EST MOD 30 MIN: CPT | Mod: PBBFAC,PO | Performed by: INTERNAL MEDICINE

## 2020-12-21 PROCEDURE — 99214 OFFICE O/P EST MOD 30 MIN: CPT | Mod: S$PBB,,, | Performed by: INTERNAL MEDICINE

## 2020-12-21 PROCEDURE — 99999 PR PBB SHADOW E&M-EST. PATIENT-LVL IV: ICD-10-PCS | Mod: PBBFAC,,, | Performed by: INTERNAL MEDICINE

## 2020-12-21 PROCEDURE — 99214 PR OFFICE/OUTPT VISIT, EST, LEVL IV, 30-39 MIN: ICD-10-PCS | Mod: S$PBB,,, | Performed by: INTERNAL MEDICINE

## 2020-12-21 RX ORDER — INSULIN LISPRO 100 [IU]/ML
INJECTION, SOLUTION INTRAVENOUS; SUBCUTANEOUS
Qty: 3 BOX | Refills: 3 | Status: SHIPPED | OUTPATIENT
Start: 2020-12-21 | End: 2023-06-05

## 2020-12-21 NOTE — PROGRESS NOTES
Subjective:    Patient ID:  Patricia Fournier is a 60 y.o. female.    Chief Complaint:  Diabetes      Pt presents to follow up for T2 DM and L adrenal adenoma.     PCP is Dr. Genevieve Haywood    With regards to the diabetes:     Onset of Type 2 diabetes mellitus was 20 years ago. Started off with Metformin. Also has RA. No recent steroids.     Known diabetic complications: none  History of DKA: no  Cardiovascular risk factors: advanced age (older than 55 for men, 65 for women), diabetes mellitus, dyslipidemia and hypertension    Currently taking:  No longer using Novolog correctional insulin BEFORE meals if blood sugar before meal more than 150. (she ran out and had issue renewing script)  Victoza 1.8 mg weekly.  Lanuts 60 once daily.  Metformin 1000 mg BID.     Took Trulicity in the past and had hypoglycemia. BG was 40. She passed out, had ? seizure and went to the hospital. She was also taking insulin at the time. No history of pancreatitis.     Since last visit, pt meet with diabetic education. BG much improved after increasing Victoza from 1.2 to 1.8 mg daily, and she stopped prandial Novolog 20 units q AC completely.     Home blood sugar records:   Fasting   Pre-lunch -  Pre-dinner   [id number]     Current diet: on average, 2-3 meals per day. Sometimes skips breakfast. Notes she has bad cravings for sugars. Smoked in the past and now has cravings for sugar, especially at night. Previously snacked a lot at night- sandwich on white bread, fruit (grapes). Has changed snacks to sugar free jello and pudding, and popcorn at night.   Current exercise: no regular exercise, sometimes does yard work  Any episodes of hypoglycemia? no  Last eye exam: 2-3 months ago. No DR per pt.     Patient notes she was having GI symptoms and had CT of her abdomen May of last year.  She was found to have a left adrenal adenoma.  The report does not describe the size or characteristics of the nodule.  She does have  hypertension but symptoms are well controlled with 2 medications lisinopril and hydrochlorothiazide.  No violaceous striae or buffalo hump. She denies voice deepening, clitoralmegaly episodic hypertension/diaphoresis/ flushing. Biochemical work up was normal.       Reduced kidney function.stable. Had trended down slightly from gfr of 50's to now 48.        Diabetes Management Status    Statin: Taking  ACE/ARB: Taking    Screening or Prevention Patient's value Goal Complete/Controlled?   HgA1C Testing and Control   Lab Results   Component Value Date    HGBA1C 8.4 (H) 08/06/2020      Annually/Less than 8% No   Lipid profile : 06/15/2020 Annually No   LDL control Lab Results   Component Value Date    LDLCALC 117 08/25/2015    Annually/Less than 100 mg/dl  No   Nephropathy screening Lab Results   Component Value Date    LABMICR < 0.6 08/25/2015     No results found for: PROTEINUA Annually No   Blood pressure BP Readings from Last 1 Encounters:   12/21/20 116/72    Less than 140/90 Yes   Dilated retinal exam Most Recent Eye Exam Date: Not Found Annually No   Foot exam   : 10/23/2020 Annually Yes     Review of Systems   Constitutional: Negative for fatigue and unexpected weight change.   Eyes: Negative for visual disturbance.   Respiratory: Negative for shortness of breath.    Cardiovascular: Negative for chest pain and leg swelling.   Gastrointestinal: Negative for abdominal pain.   Endocrine: Negative for polydipsia, polyphagia and polyuria.   Musculoskeletal: Negative for myalgias.   Skin: Positive for wound.   Neurological: Positive for numbness. Negative for headaches.   Psychiatric/Behavioral: Positive for sleep disturbance.        Past Medical History:   Diagnosis Date    Diabetes mellitus, type 2     GERD (gastroesophageal reflux disease)     Hypertension       Social History     Tobacco Use    Smoking status: Former Smoker     Packs/day: 1.50     Years: 20.00     Pack years: 30.00    Smokeless tobacco:  "Former User     Quit date: 2002   Substance Use Topics    Alcohol use: No     Alcohol/week: 0.0 standard drinks    Drug use: No     Family History   Problem Relation Age of Onset    Heart disease Mother     Diabetes Mother     Cancer Father     Diabetes Sister     Diabetes Brother     Diabetes Maternal Grandmother       Past Surgical History:   Procedure Laterality Date     SECTION      gallbladder removed      TUBAL LIGATION            Current Outpatient Medications:     ACCU-CHEK GUIDE Strp, Check blood sugar four times daily., Disp: 400 strip, Rfl: 3    BD ULTRA-FINE GEENA PEN NEEDLE 32 gauge x 5/32" Ndle, Use 4x daily with insulin., Disp: 400 each, Rfl: 3    chlorthalidone (HYGROTEN) 50 MG Tab, Take 50 mg by mouth once daily. , Disp: , Rfl:     FLUoxetine 20 MG capsule, Take 20 mg by mouth., Disp: , Rfl:     fluticasone propionate (FLONASE) 50 mcg/actuation nasal spray, 2 sprays by Each Nostril route once daily., Disp: , Rfl: 2    hydrOXYchloroQUINE (PLAQUENIL) 200 mg tablet, Take 1 tablet (200 mg total) by mouth 2 (two) times daily., Disp: 60 tablet, Rfl: 6    insulin (LANTUS SOLOSTAR U-100 INSULIN) glargine 100 units/mL (3mL) SubQ pen, Inject 60 Units into the skin once daily., Disp: 3 Box, Rfl: 3    linaCLOtide (LINZESS) 72 mcg Cap capsule, Take 72 mcg by mouth., Disp: , Rfl:     liraglutide 0.6 mg/0.1 mL, 18 mg/3 mL, subq PNIJ (VICTOZA 2-BENJAMIN) 0.6 mg/0.1 mL (18 mg/3 mL) PnIj pen, Inject 1.8 mg into the skin daily., Disp: 9 mL, Rfl: 3    lisinopriL (PRINIVIL,ZESTRIL) 20 MG tablet, Take 1 tablet (20 mg total) by mouth once daily., Disp: 90 tablet, Rfl: 3    meloxicam (MOBIC) 7.5 MG tablet, Take 1 tablet (7.5 mg total) by mouth once daily., Disp: 30 tablet, Rfl: 6    metFORMIN (GLUCOPHAGE-XR) 500 MG ER 24hr tablet, Take 2 tablets (1,000 mg total) by mouth 2 (two) times daily with meals., Disp: 360 tablet, Rfl: 3    miscellaneous medical supply (C-TUB) Misc, Diabetic shoes with " "full-length semi-rigid accommadative  Orthotics posting medial heel DX E11.9, [place]. Pes planus., Disp: , Rfl:     ondansetron (ZOFRAN-ODT) 4 MG TbDL, Take 1 tablet (4 mg total) by mouth every 6 (six) hours as needed., Disp: 30 tablet, Rfl: 0    pantoprazole (PROTONIX) 40 MG tablet, Take 40 mg by mouth., Disp: , Rfl:     pravastatin (PRAVACHOL) 40 MG tablet, Take 40 mg by mouth once daily. , Disp: , Rfl:     insulin lispro 100 unit/mL pen, Take SSI 2:50 if premeal BG >150. 2 units for every 50 mg/dL over 150. TDD not to exceed 40 units, Disp: 3 Box, Rfl: 3     Review of patient's allergies indicates:   Allergen Reactions    Allspice Hives     Pt states allergic to pimento( which is part of allspice family)    Trulicity [dulaglutide] Other (See Comments)     Severe hypoglycemic and hospitalized        Objective:   /72   Pulse 93   Ht 5' 3.5" (1.613 m)   Wt 104.5 kg (230 lb 4.3 oz)   SpO2 99%   BMI 40.15 kg/m²   BP Readings from Last 3 Encounters:   12/21/20 116/72   11/23/20 124/60   11/18/20 129/75     Wt Readings from Last 3 Encounters:   12/21/20 0830 104.5 kg (230 lb 4.3 oz)   12/08/20 1030 104.9 kg (231 lb 4.2 oz)   11/23/20 0929 104.1 kg (229 lb 8 oz)          Physical Exam  Vitals signs reviewed.   Constitutional:       General: She is not in acute distress.     Appearance: Normal appearance. She is well-developed. She is obese. She is not ill-appearing, toxic-appearing or diaphoretic.   HENT:      Head: Normocephalic and atraumatic.      Right Ear: External ear normal.      Left Ear: External ear normal.   Eyes:      General: No scleral icterus.  Neck:      Trachea: No tracheal deviation.   Pulmonary:      Effort: Pulmonary effort is normal. No respiratory distress.   Abdominal:      General: There is no distension.   Neurological:      General: No focal deficit present.      Mental Status: She is alert and oriented to person, place, and time.   Psychiatric:         Thought Content: Thought " content normal.           Lab Results   Component Value Date     11/11/2020     (L) 08/25/2015    K 5.2 (H) 11/11/2020    K 4.3 08/25/2015     11/11/2020     08/25/2015    CO2 27 11/11/2020    BUN 37 (H) 11/11/2020    CREATININE 1.2 11/11/2020    CREATININE 0.52 08/25/2015     (H) 11/11/2020    HGBA1C 8.4 (H) 08/06/2020    AST 26 11/11/2020    ALT 33 11/11/2020    ALBUMIN 4.0 11/11/2020    ALBUMIN 3.9 08/25/2015    PROT 6.9 11/11/2020    BILITOT 0.3 11/11/2020    WBC 5.19 11/11/2020    HGB 11.8 (L) 11/11/2020    HCT 36.7 (L) 11/11/2020    MCV 98 11/11/2020    MCH 31.5 (H) 11/11/2020     11/11/2020    MPV 10.8 11/11/2020    GRAN 2.9 11/11/2020    GRAN 55.6 11/11/2020    LYMPH 1.4 11/11/2020    LYMPH 26.6 11/11/2020    CHOL 211 08/25/2015    HDL 43 08/25/2015    LDLCALC 117 08/25/2015    TRIG 256 (H) 08/25/2015       Lab Results   Component Value Date    TSH 1.400 11/11/2020    FREET4 0.77 11/11/2020        Thyroid Labs Latest Ref Rng & Units 7/9/2020 8/6/2020 11/11/2020   TSH 0.400 - 4.000 uIU/mL - - 1.400   Free T4 0.71 - 1.51 ng/dL - - 0.77   Sodium 136 - 145 mmol/L 137 139 139   Potassium 3.5 - 5.1 mmol/L 4.6 4.9 5.2(H)   Chloride 95 - 110 mmol/L 103 106 104   Carbon Dioxide 23 - 29 mmol/L 23 28 27   Glucose 70 - 110 mg/dL 291(H) 189(H) 186(H)   Blood Urea Nitrogen 8 - 23 mg/dL 24(H) 30(H) 37(H)   Creatinine 0.5 - 1.4 mg/dL 1.1 1.2 1.2   Calcium 8.7 - 10.5 mg/dL 10.1 9.5 9.3   Total Protein 6.0 - 8.4 g/dL 7.3 - 6.9   Albumin 3.5 - 5.2 g/dL 3.9 - 4.0   Total Bilirubin 0.1 - 1.0 mg/dL 0.3 - 0.3   AST 10 - 40 U/L 20 - 26   ALT 10 - 44 U/L 25 - 33   Anion Gap 8 - 16 mmol/L 11 5(L) 8   eGFR (African American) >60 mL/min/1.73 m:2 >60.0 56.0(A) 56.0(A)   eGFR (Non-African American) >60 mL/min/1.73 m:2 53.9(A) 48.6(A) 48.6(A)   WBC 3.90 - 12.70 K/uL 5.97 - 5.19   RBC 4.00 - 5.40 M/uL 3.93(L) - 3.75(L)   Hemoglobin 12.0 - 16.0 g/dL 12.0 - 11.8(L)   Hematocrit 37.0 - 48.5 % 37.7 -  36.7(L)   MCV 82 - 98 fL 96 - 98   MCH 27.0 - 31.0 pg 30.5 - 31.5(H)   MCHC 32.0 - 36.0 g/dL 31.8(L) - 32.2   RDW 11.5 - 14.5 % 12.4 - 12.4   Platelets 150 - 350 K/uL 184 - 234   MPV 9.2 - 12.9 fL 11.8 - 10.8   Gran # 1.8 - 7.7 K/uL 3.6 - 2.9   Lymph # 1.0 - 4.8 K/uL 1.3 - 1.4   Mono # 0.3 - 1.0 K/uL 0.5 - 0.6   Eos # 0.0 - 0.5 K/uL 0.5 - 0.3   Baso # 0.00 - 0.20 K/uL 0.03 - 0.03   Gran % 38.0 - 73.0 % 60.0 - 55.6   Lymph % 18.0 - 48.0 % 22.3 - 26.6   Mono% 4.0 - 15.0 % 9.0 - 10.6   Eos % 0.0 - 8.0 % 7.7 - 6.4   Baso % 0.0 - 1.9 % 0.5 - 0.6   Thyroglobulin, Antibody Screen 0.0 - 3.9 IU/mL - - -   Thyroperoxidase Antibodies <6.0 IU/mL - - -           Hemoglobin A1C   Date Value Ref Range Status   08/06/2020 8.4 (H) 4.0 - 5.6 % Final     Comment:     ADA Screening Guidelines:  5.7-6.4%  Consistent with prediabetes  >or=6.5%  Consistent with diabetes  High levels of fetal hemoglobin interfere with the HbA1C  assay. Heterozygous hemoglobin variants (HbS, HgC, etc)do  not significantly interfere with this assay.   However, presence of multiple variants may affect accuracy.     11/14/2019 8.7 (H) 4.0 - 5.6 % Final     Comment:     ADA Screening Guidelines:  5.7-6.4%  Consistent with prediabetes  >or=6.5%  Consistent with diabetes  High levels of fetal hemoglobin interfere with the HbA1C  assay. Heterozygous hemoglobin variants (HbS, HgC, etc)do  not significantly interfere with this assay.   However, presence of multiple variants may affect accuracy.     08/25/2015 12.8 (H) 0.0 - 5.6 % Final     Comment:     Reference Interval:_5.0 - 5.6 Normal  5.7 - 6.4 High Risk  > 6.5 Diabetic  Hgb A1c results are standardized based on the (NGSP) National  Glycohemoglobin Standardization Program.  Hemoglobin A1C levels are related to mean serum/plasma glucose during the  preceding 2-3 months.           Assessment and plan:       Problem List Items Addressed This Visit        Cardiac/Vascular    Hyperlipidemia    Current Assessment &  "Plan     Recheck at next visit. On statin.         Relevant Orders    Lipid Panel    Essential hypertension    Current Assessment & Plan     BP controlled. Continue current meds. Pt to notify PCP if BP consistently more than 140/90.              Renal/    CKD (chronic kidney disease) stage 3, GFR 30-59 ml/min    Current Assessment & Plan     GFR reduced but stable at 48. Continue to monitor. Avoid nephrotoxics.            Oncology    Adenoma of left adrenal gland    Current Assessment & Plan     Reviewed outside records CT from [facility] on May 7, 2019 with left benign adrenal adenoma."  The report did not describe the size or characteristics of the nodule. Biochemical workup neg Aug 2020. Repeat in 1 year. Repeat CT in 1-2 years from prior.            Endocrine    Diabetes mellitus type 2 with complications, uncontrolled - Primary    Current Assessment & Plan     BG much improved and mostly at goal  HbA1C 8.4% Aug 2020. Recheck prior to next visit.  Resume Novolog correctional insulin 2:50 BEFORE meals if blood sugar before meal more than 150.  Continue Lanuts 60 units once daily.  Continue Metformin 1000 mg BID.   Continue Victoza 1.8 mg daily. Notify provider if BG consistently <70. Check twice daily. In am and then alternate before other meals and bedtime.   Up to date with eye exam   On Ace. Check urine microalbumin.  Discussed proper foot care.  Counseled pt on low carb/low fat diet and to increase physical activity. Continue reducing snacking as this causes vast fluctuations in her blood sugars in the past.  On statin           Relevant Medications    insulin lispro 100 unit/mL pen    Other Relevant Orders    Hemoglobin A1C    Microalbumin/Creatinine Ratio, Urine       Other    Class 3 severe obesity due to excess calories with serious comorbidity and body mass index (BMI) of 40.0 to 44.9 in adult    Current Assessment & Plan     BMI 40. Encouraged healthy low fat low carb diet and increase " physical activity                  RTC in 3 month with any diabetic NP with A1C, lipid panel prior

## 2020-12-21 NOTE — ASSESSMENT & PLAN NOTE
BG much improved and mostly at goal  HbA1C 8.4% Aug 2020. Recheck prior to next visit.  Resume Novolog correctional insulin 2:50 BEFORE meals if blood sugar before meal more than 150.  Continue Lanuts 60 units once daily.  Continue Metformin 1000 mg BID.   Continue Victoza 1.8 mg daily. Notify provider if BG consistently <70. Check twice daily. In am and then alternate before other meals and bedtime.   Up to date with eye exam   On Ace. Check urine microalbumin.  Discussed proper foot care.  Counseled pt on low carb/low fat diet and to increase physical activity. Continue reducing snacking as this causes vast fluctuations in her blood sugars in the past.  On statin

## 2020-12-21 NOTE — ASSESSMENT & PLAN NOTE
"Reviewed outside records CT from [facility] on May 7, 2019 with left benign adrenal adenoma."  The report did not describe the size or characteristics of the nodule. Biochemical workup neg Aug 2020. Repeat in 1 year. Repeat CT in 1-2 years from prior.  "

## 2021-01-28 DIAGNOSIS — M35.00 SJOGREN'S SYNDROME, WITH UNSPECIFIED ORGAN INVOLVEMENT: ICD-10-CM

## 2021-01-28 DIAGNOSIS — M06.00 SERONEGATIVE RHEUMATOID ARTHRITIS: ICD-10-CM

## 2021-01-29 RX ORDER — MELOXICAM 7.5 MG/1
7.5 TABLET ORAL DAILY
Qty: 30 TABLET | Refills: 6 | Status: SHIPPED | OUTPATIENT
Start: 2021-01-29 | End: 2021-07-19

## 2021-02-04 ENCOUNTER — TELEPHONE (OUTPATIENT)
Dept: ENDOCRINOLOGY | Facility: CLINIC | Age: 61
End: 2021-02-04

## 2021-03-10 ENCOUNTER — LAB VISIT (OUTPATIENT)
Dept: LAB | Facility: HOSPITAL | Age: 61
End: 2021-03-10
Attending: INTERNAL MEDICINE
Payer: MEDICAID

## 2021-03-10 DIAGNOSIS — M35.00 SJOGREN'S SYNDROME, WITH UNSPECIFIED ORGAN INVOLVEMENT: ICD-10-CM

## 2021-03-10 DIAGNOSIS — M06.00 SERONEGATIVE RHEUMATOID ARTHRITIS: ICD-10-CM

## 2021-03-10 DIAGNOSIS — M47.812 CERVICAL ARTHRITIS: ICD-10-CM

## 2021-03-10 DIAGNOSIS — E78.2 MIXED HYPERLIPIDEMIA: ICD-10-CM

## 2021-03-10 LAB
ALBUMIN SERPL BCP-MCNC: 3.9 G/DL (ref 3.5–5.2)
ALP SERPL-CCNC: 53 U/L (ref 55–135)
ALT SERPL W/O P-5'-P-CCNC: 23 U/L (ref 10–44)
ANION GAP SERPL CALC-SCNC: 6 MMOL/L (ref 8–16)
AST SERPL-CCNC: 18 U/L (ref 10–40)
BASOPHILS # BLD AUTO: 0.02 K/UL (ref 0–0.2)
BASOPHILS NFR BLD: 0.4 % (ref 0–1.9)
BILIRUB SERPL-MCNC: 0.3 MG/DL (ref 0.1–1)
BUN SERPL-MCNC: 36 MG/DL (ref 8–23)
CALCIUM SERPL-MCNC: 10.1 MG/DL (ref 8.7–10.5)
CHLORIDE SERPL-SCNC: 107 MMOL/L (ref 95–110)
CHOLEST SERPL-MCNC: 133 MG/DL (ref 120–199)
CHOLEST/HDLC SERPL: 2.6 {RATIO} (ref 2–5)
CO2 SERPL-SCNC: 27 MMOL/L (ref 23–29)
CREAT SERPL-MCNC: 1.3 MG/DL (ref 0.5–1.4)
CRP SERPL-MCNC: 1 MG/L (ref 0–8.2)
DIFFERENTIAL METHOD: ABNORMAL
EOSINOPHIL # BLD AUTO: 0.3 K/UL (ref 0–0.5)
EOSINOPHIL NFR BLD: 6.5 % (ref 0–8)
ERYTHROCYTE [DISTWIDTH] IN BLOOD BY AUTOMATED COUNT: 12.7 % (ref 11.5–14.5)
ERYTHROCYTE [SEDIMENTATION RATE] IN BLOOD BY WESTERGREN METHOD: 16 MM/HR (ref 0–20)
EST. GFR  (AFRICAN AMERICAN): 50.8 ML/MIN/1.73 M^2
EST. GFR  (NON AFRICAN AMERICAN): 44.1 ML/MIN/1.73 M^2
ESTIMATED AVG GLUCOSE: 140 MG/DL (ref 68–131)
GLUCOSE SERPL-MCNC: 176 MG/DL (ref 70–110)
HBA1C MFR BLD: 6.5 % (ref 4–5.6)
HCT VFR BLD AUTO: 36.1 % (ref 37–48.5)
HDLC SERPL-MCNC: 52 MG/DL (ref 40–75)
HDLC SERPL: 39.1 % (ref 20–50)
HGB BLD-MCNC: 11.6 G/DL (ref 12–16)
IMM GRANULOCYTES # BLD AUTO: 0.01 K/UL (ref 0–0.04)
IMM GRANULOCYTES NFR BLD AUTO: 0.2 % (ref 0–0.5)
LDLC SERPL CALC-MCNC: 54.6 MG/DL (ref 63–159)
LYMPHOCYTES # BLD AUTO: 1.4 K/UL (ref 1–4.8)
LYMPHOCYTES NFR BLD: 29.8 % (ref 18–48)
MCH RBC QN AUTO: 30.4 PG (ref 27–31)
MCHC RBC AUTO-ENTMCNC: 32.1 G/DL (ref 32–36)
MCV RBC AUTO: 95 FL (ref 82–98)
MONOCYTES # BLD AUTO: 0.5 K/UL (ref 0.3–1)
MONOCYTES NFR BLD: 10.2 % (ref 4–15)
NEUTROPHILS # BLD AUTO: 2.4 K/UL (ref 1.8–7.7)
NEUTROPHILS NFR BLD: 52.9 % (ref 38–73)
NONHDLC SERPL-MCNC: 81 MG/DL
NRBC BLD-RTO: 0 /100 WBC
PLATELET # BLD AUTO: 246 K/UL (ref 150–350)
PMV BLD AUTO: 11 FL (ref 9.2–12.9)
POTASSIUM SERPL-SCNC: 5.1 MMOL/L (ref 3.5–5.1)
PROT SERPL-MCNC: 7.2 G/DL (ref 6–8.4)
RBC # BLD AUTO: 3.82 M/UL (ref 4–5.4)
SODIUM SERPL-SCNC: 140 MMOL/L (ref 136–145)
TRIGL SERPL-MCNC: 132 MG/DL (ref 30–150)
WBC # BLD AUTO: 4.6 K/UL (ref 3.9–12.7)

## 2021-03-10 PROCEDURE — 80061 LIPID PANEL: CPT | Performed by: INTERNAL MEDICINE

## 2021-03-10 PROCEDURE — 85651 RBC SED RATE NONAUTOMATED: CPT | Mod: PO | Performed by: PHYSICIAN ASSISTANT

## 2021-03-10 PROCEDURE — 36415 COLL VENOUS BLD VENIPUNCTURE: CPT | Mod: PO | Performed by: PHYSICIAN ASSISTANT

## 2021-03-10 PROCEDURE — 86140 C-REACTIVE PROTEIN: CPT | Performed by: PHYSICIAN ASSISTANT

## 2021-03-10 PROCEDURE — 80053 COMPREHEN METABOLIC PANEL: CPT | Performed by: PHYSICIAN ASSISTANT

## 2021-03-10 PROCEDURE — 85025 COMPLETE CBC W/AUTO DIFF WBC: CPT | Performed by: PHYSICIAN ASSISTANT

## 2021-03-10 PROCEDURE — 83036 HEMOGLOBIN GLYCOSYLATED A1C: CPT | Performed by: INTERNAL MEDICINE

## 2021-03-17 ENCOUNTER — HOSPITAL ENCOUNTER (OUTPATIENT)
Dept: RADIOLOGY | Facility: HOSPITAL | Age: 61
Discharge: HOME | End: 2021-03-17
Attending: INTERNAL MEDICINE
Payer: MEDICAID

## 2021-03-17 ENCOUNTER — OFFICE VISIT (OUTPATIENT)
Dept: RHEUMATOLOGY | Facility: CLINIC | Age: 61
End: 2021-03-17
Payer: MEDICAID

## 2021-03-17 VITALS
SYSTOLIC BLOOD PRESSURE: 127 MMHG | WEIGHT: 235.81 LBS | HEART RATE: 79 BPM | DIASTOLIC BLOOD PRESSURE: 71 MMHG | BODY MASS INDEX: 41.11 KG/M2

## 2021-03-17 DIAGNOSIS — M06.00 SERONEGATIVE RHEUMATOID ARTHRITIS: ICD-10-CM

## 2021-03-17 DIAGNOSIS — M35.00 SJOGREN'S SYNDROME, WITH UNSPECIFIED ORGAN INVOLVEMENT: ICD-10-CM

## 2021-03-17 DIAGNOSIS — M54.2 BILATERAL NECK PAIN: ICD-10-CM

## 2021-03-17 DIAGNOSIS — R68.84 JAW PAIN: ICD-10-CM

## 2021-03-17 DIAGNOSIS — M35.00 SJOGREN'S SYNDROME, WITH UNSPECIFIED ORGAN INVOLVEMENT: Primary | ICD-10-CM

## 2021-03-17 PROCEDURE — 70100 X-RAY EXAM OF JAW <4VIEWS: CPT | Mod: TC,FY,PO

## 2021-03-17 PROCEDURE — 70100 X-RAY EXAM OF JAW <4VIEWS: CPT | Mod: 26,,, | Performed by: RADIOLOGY

## 2021-03-17 PROCEDURE — 99214 OFFICE O/P EST MOD 30 MIN: CPT | Mod: PBBFAC,25,PO | Performed by: INTERNAL MEDICINE

## 2021-03-17 PROCEDURE — 99999 PR PBB SHADOW E&M-EST. PATIENT-LVL IV: CPT | Mod: PBBFAC,,, | Performed by: INTERNAL MEDICINE

## 2021-03-17 PROCEDURE — 70100 XR MANDIBLE LESS THAN 4 VIEWS: ICD-10-PCS | Mod: 26,,, | Performed by: RADIOLOGY

## 2021-03-17 PROCEDURE — 99215 OFFICE O/P EST HI 40 MIN: CPT | Mod: S$PBB,,, | Performed by: INTERNAL MEDICINE

## 2021-03-17 PROCEDURE — 99215 PR OFFICE/OUTPT VISIT, EST, LEVL V, 40-54 MIN: ICD-10-PCS | Mod: S$PBB,,, | Performed by: INTERNAL MEDICINE

## 2021-03-17 PROCEDURE — 99999 PR PBB SHADOW E&M-EST. PATIENT-LVL IV: ICD-10-PCS | Mod: PBBFAC,,, | Performed by: INTERNAL MEDICINE

## 2021-03-17 RX ORDER — HYDROXYCHLOROQUINE SULFATE 200 MG/1
200 TABLET, FILM COATED ORAL 2 TIMES DAILY
Qty: 60 TABLET | Refills: 6 | Status: SHIPPED | OUTPATIENT
Start: 2021-03-17 | End: 2021-07-19 | Stop reason: SDUPTHER

## 2021-03-17 RX ORDER — METHOCARBAMOL 750 MG/1
750 TABLET, FILM COATED ORAL 3 TIMES DAILY
Qty: 90 TABLET | Refills: 3 | Status: SHIPPED | OUTPATIENT
Start: 2021-03-17 | End: 2021-04-16

## 2021-03-17 RX ORDER — CEVIMELINE HYDROCHLORIDE 30 MG/1
30 CAPSULE ORAL 3 TIMES DAILY
Qty: 90 CAPSULE | Refills: 11 | Status: SHIPPED | OUTPATIENT
Start: 2021-03-17 | End: 2022-03-25

## 2021-03-17 ASSESSMENT — ROUTINE ASSESSMENT OF PATIENT INDEX DATA (RAPID3)
FATIGUE SCORE: 1.1
PAIN SCORE: 1.5
TOTAL RAPID3 SCORE: 1.89
PSYCHOLOGICAL DISTRESS SCORE: 0
AM STIFFNESS SCORE: 1, YES
MDHAQ FUNCTION SCORE: 0.8
PATIENT GLOBAL ASSESSMENT SCORE: 1.5

## 2021-03-24 ENCOUNTER — TELEPHONE (OUTPATIENT)
Dept: RHEUMATOLOGY | Facility: CLINIC | Age: 61
End: 2021-03-24

## 2021-03-24 DIAGNOSIS — M35.00 SJOGREN'S SYNDROME, WITH UNSPECIFIED ORGAN INVOLVEMENT: Primary | ICD-10-CM

## 2021-03-24 DIAGNOSIS — R68.84 JAW PAIN: ICD-10-CM

## 2021-04-06 ENCOUNTER — OFFICE VISIT (OUTPATIENT)
Dept: ENDOCRINOLOGY | Facility: CLINIC | Age: 61
End: 2021-04-06
Payer: MEDICAID

## 2021-04-06 VITALS
SYSTOLIC BLOOD PRESSURE: 118 MMHG | HEART RATE: 92 BPM | DIASTOLIC BLOOD PRESSURE: 68 MMHG | BODY MASS INDEX: 40.46 KG/M2 | WEIGHT: 237 LBS | HEIGHT: 64 IN | RESPIRATION RATE: 18 BRPM

## 2021-04-06 DIAGNOSIS — Z79.4 TYPE 2 DIABETES MELLITUS WITH STAGE 3A CHRONIC KIDNEY DISEASE, WITH LONG-TERM CURRENT USE OF INSULIN: Primary | ICD-10-CM

## 2021-04-06 DIAGNOSIS — N18.31 STAGE 3A CHRONIC KIDNEY DISEASE: ICD-10-CM

## 2021-04-06 DIAGNOSIS — E66.01 MORBID OBESITY WITH BMI OF 40.0-44.9, ADULT: ICD-10-CM

## 2021-04-06 DIAGNOSIS — E11.22 TYPE 2 DIABETES MELLITUS WITH STAGE 3A CHRONIC KIDNEY DISEASE, WITH LONG-TERM CURRENT USE OF INSULIN: Primary | ICD-10-CM

## 2021-04-06 DIAGNOSIS — N18.31 TYPE 2 DIABETES MELLITUS WITH STAGE 3A CHRONIC KIDNEY DISEASE, WITH LONG-TERM CURRENT USE OF INSULIN: Primary | ICD-10-CM

## 2021-04-06 DIAGNOSIS — I10 ESSENTIAL HYPERTENSION: ICD-10-CM

## 2021-04-06 DIAGNOSIS — E78.2 MIXED HYPERLIPIDEMIA: ICD-10-CM

## 2021-04-06 PROCEDURE — 99214 PR OFFICE/OUTPT VISIT, EST, LEVL IV, 30-39 MIN: ICD-10-PCS | Mod: S$PBB,,, | Performed by: NURSE PRACTITIONER

## 2021-04-06 PROCEDURE — 99999 PR PBB SHADOW E&M-EST. PATIENT-LVL V: ICD-10-PCS | Mod: PBBFAC,,, | Performed by: NURSE PRACTITIONER

## 2021-04-06 PROCEDURE — 99999 PR PBB SHADOW E&M-EST. PATIENT-LVL V: CPT | Mod: PBBFAC,,, | Performed by: NURSE PRACTITIONER

## 2021-04-06 PROCEDURE — 99215 OFFICE O/P EST HI 40 MIN: CPT | Mod: PBBFAC,PO | Performed by: NURSE PRACTITIONER

## 2021-04-06 PROCEDURE — 99214 OFFICE O/P EST MOD 30 MIN: CPT | Mod: S$PBB,,, | Performed by: NURSE PRACTITIONER

## 2021-04-06 RX ORDER — LISINOPRIL 20 MG/1
20 TABLET ORAL DAILY
Qty: 30 TABLET | Refills: 12
Start: 2021-04-06 | End: 2023-07-28 | Stop reason: SDUPTHER

## 2021-04-06 RX ORDER — PRAVASTATIN SODIUM 40 MG/1
40 TABLET ORAL DAILY
Qty: 30 TABLET | Refills: 12 | Status: SHIPPED | OUTPATIENT
Start: 2021-04-06 | End: 2023-07-28 | Stop reason: SDUPTHER

## 2021-04-20 ENCOUNTER — OFFICE VISIT (OUTPATIENT)
Dept: OTOLARYNGOLOGY | Facility: CLINIC | Age: 61
End: 2021-04-20
Payer: MEDICAID

## 2021-04-20 VITALS — HEIGHT: 64 IN | WEIGHT: 237 LBS | BODY MASS INDEX: 40.46 KG/M2 | TEMPERATURE: 99 F

## 2021-04-20 DIAGNOSIS — H92.01 OTALGIA, RIGHT: ICD-10-CM

## 2021-04-20 DIAGNOSIS — J35.8 TONSIL ASYMMETRY: Primary | ICD-10-CM

## 2021-04-20 DIAGNOSIS — R68.84 JAW PAIN: ICD-10-CM

## 2021-04-20 DIAGNOSIS — M35.00 SJOGREN'S SYNDROME, WITH UNSPECIFIED ORGAN INVOLVEMENT: ICD-10-CM

## 2021-04-20 PROCEDURE — 31575 DIAGNOSTIC LARYNGOSCOPY: CPT | Mod: S$PBB,,, | Performed by: OTOLARYNGOLOGY

## 2021-04-20 PROCEDURE — 99204 PR OFFICE/OUTPT VISIT, NEW, LEVL IV, 45-59 MIN: ICD-10-PCS | Mod: 25,S$PBB,, | Performed by: OTOLARYNGOLOGY

## 2021-04-20 PROCEDURE — 31575 PR LARYNGOSCOPY, FLEXIBLE; DIAGNOSTIC: ICD-10-PCS | Mod: S$PBB,,, | Performed by: OTOLARYNGOLOGY

## 2021-04-20 PROCEDURE — 99213 OFFICE O/P EST LOW 20 MIN: CPT | Mod: PBBFAC,PO,25 | Performed by: OTOLARYNGOLOGY

## 2021-04-20 PROCEDURE — 31575 DIAGNOSTIC LARYNGOSCOPY: CPT | Mod: PBBFAC,PO | Performed by: OTOLARYNGOLOGY

## 2021-04-20 PROCEDURE — 99999 PR PBB SHADOW E&M-EST. PATIENT-LVL III: CPT | Mod: PBBFAC,,, | Performed by: OTOLARYNGOLOGY

## 2021-04-20 PROCEDURE — 99204 OFFICE O/P NEW MOD 45 MIN: CPT | Mod: 25,S$PBB,, | Performed by: OTOLARYNGOLOGY

## 2021-04-20 PROCEDURE — 99999 PR PBB SHADOW E&M-EST. PATIENT-LVL III: ICD-10-PCS | Mod: PBBFAC,,, | Performed by: OTOLARYNGOLOGY

## 2021-04-23 ENCOUNTER — HOSPITAL ENCOUNTER (OUTPATIENT)
Dept: RADIOLOGY | Facility: HOSPITAL | Age: 61
Discharge: HOME | End: 2021-04-23
Attending: OTOLARYNGOLOGY
Payer: MEDICAID

## 2021-04-23 DIAGNOSIS — R68.84 JAW PAIN: ICD-10-CM

## 2021-04-23 DIAGNOSIS — H92.01 OTALGIA, RIGHT: ICD-10-CM

## 2021-04-23 DIAGNOSIS — J35.8 TONSIL ASYMMETRY: ICD-10-CM

## 2021-04-23 PROCEDURE — 70491 CT SOFT TISSUE NECK WITH CONTRAST: ICD-10-PCS | Mod: 26,,, | Performed by: RADIOLOGY

## 2021-04-23 PROCEDURE — 25500020 PHARM REV CODE 255: Mod: PO | Performed by: OTOLARYNGOLOGY

## 2021-04-23 PROCEDURE — 70491 CT SOFT TISSUE NECK W/DYE: CPT | Mod: TC,PO

## 2021-04-23 PROCEDURE — 70491 CT SOFT TISSUE NECK W/DYE: CPT | Mod: 26,,, | Performed by: RADIOLOGY

## 2021-04-23 RX ORDER — AMOXICILLIN 875 MG/1
875 TABLET, FILM COATED ORAL EVERY 12 HOURS
Qty: 20 TABLET | Refills: 0 | Status: SHIPPED | OUTPATIENT
Start: 2021-04-23 | End: 2021-04-26

## 2021-04-23 RX ADMIN — IOHEXOL 75 ML: 350 INJECTION, SOLUTION INTRAVENOUS at 10:04

## 2021-04-26 ENCOUNTER — TELEPHONE (OUTPATIENT)
Dept: OTOLARYNGOLOGY | Facility: CLINIC | Age: 61
End: 2021-04-26

## 2021-04-26 RX ORDER — CEFDINIR 300 MG/1
300 CAPSULE ORAL 2 TIMES DAILY
Qty: 20 CAPSULE | Refills: 0 | Status: SHIPPED | OUTPATIENT
Start: 2021-04-26 | End: 2021-05-06

## 2021-06-30 ENCOUNTER — LAB VISIT (OUTPATIENT)
Dept: LAB | Facility: HOSPITAL | Age: 61
End: 2021-06-30
Attending: NURSE PRACTITIONER
Payer: MEDICAID

## 2021-06-30 LAB
ESTIMATED AVG GLUCOSE: 148 MG/DL (ref 68–131)
HBA1C MFR BLD: 6.8 % (ref 4–5.6)

## 2021-06-30 PROCEDURE — 36415 COLL VENOUS BLD VENIPUNCTURE: CPT | Mod: PO | Performed by: NURSE PRACTITIONER

## 2021-06-30 PROCEDURE — 83036 HEMOGLOBIN GLYCOSYLATED A1C: CPT | Performed by: NURSE PRACTITIONER

## 2021-07-07 ENCOUNTER — OFFICE VISIT (OUTPATIENT)
Dept: ENDOCRINOLOGY | Facility: CLINIC | Age: 61
End: 2021-07-07
Payer: MEDICAID

## 2021-07-07 VITALS
WEIGHT: 228.06 LBS | HEIGHT: 64 IN | SYSTOLIC BLOOD PRESSURE: 136 MMHG | BODY MASS INDEX: 38.94 KG/M2 | DIASTOLIC BLOOD PRESSURE: 74 MMHG | HEART RATE: 73 BPM

## 2021-07-07 DIAGNOSIS — I10 ESSENTIAL HYPERTENSION: ICD-10-CM

## 2021-07-07 DIAGNOSIS — E78.2 MIXED HYPERLIPIDEMIA: ICD-10-CM

## 2021-07-07 DIAGNOSIS — Z79.4 TYPE 2 DIABETES MELLITUS WITH STAGE 3A CHRONIC KIDNEY DISEASE, WITH LONG-TERM CURRENT USE OF INSULIN: Primary | ICD-10-CM

## 2021-07-07 DIAGNOSIS — E66.01 MORBID OBESITY WITH BMI OF 40.0-44.9, ADULT: ICD-10-CM

## 2021-07-07 DIAGNOSIS — N18.31 TYPE 2 DIABETES MELLITUS WITH STAGE 3A CHRONIC KIDNEY DISEASE, WITH LONG-TERM CURRENT USE OF INSULIN: Primary | ICD-10-CM

## 2021-07-07 DIAGNOSIS — E11.22 TYPE 2 DIABETES MELLITUS WITH STAGE 3A CHRONIC KIDNEY DISEASE, WITH LONG-TERM CURRENT USE OF INSULIN: Primary | ICD-10-CM

## 2021-07-07 PROCEDURE — 99214 PR OFFICE/OUTPT VISIT, EST, LEVL IV, 30-39 MIN: ICD-10-PCS | Mod: S$PBB,,, | Performed by: NURSE PRACTITIONER

## 2021-07-07 PROCEDURE — 99999 PR PBB SHADOW E&M-EST. PATIENT-LVL V: CPT | Mod: PBBFAC,,, | Performed by: NURSE PRACTITIONER

## 2021-07-07 PROCEDURE — 99999 PR PBB SHADOW E&M-EST. PATIENT-LVL V: ICD-10-PCS | Mod: PBBFAC,,, | Performed by: NURSE PRACTITIONER

## 2021-07-07 PROCEDURE — 99214 OFFICE O/P EST MOD 30 MIN: CPT | Mod: S$PBB,,, | Performed by: NURSE PRACTITIONER

## 2021-07-07 PROCEDURE — 99215 OFFICE O/P EST HI 40 MIN: CPT | Mod: PBBFAC,PO | Performed by: NURSE PRACTITIONER

## 2021-07-07 RX ORDER — LANCETS
EACH MISCELLANEOUS
Qty: 100 EACH | Refills: 11 | Status: SHIPPED | OUTPATIENT
Start: 2021-07-07

## 2021-07-07 RX ORDER — INSULIN PUMP SYRINGE, 3 ML
EACH MISCELLANEOUS
Qty: 1 EACH | Refills: 0 | Status: SHIPPED | OUTPATIENT
Start: 2021-07-07 | End: 2024-01-24

## 2021-07-07 RX ORDER — BLOOD SUGAR DIAGNOSTIC
STRIP MISCELLANEOUS
Qty: 100 EACH | Refills: 12 | Status: SHIPPED | OUTPATIENT
Start: 2021-07-07 | End: 2021-07-08 | Stop reason: ALTCHOICE

## 2021-07-07 RX ORDER — METHOCARBAMOL 750 MG/1
750 TABLET, FILM COATED ORAL
COMMUNITY
Start: 2021-04-25 | End: 2021-10-24

## 2021-07-07 RX ORDER — INSULIN GLARGINE 100 [IU]/ML
60 INJECTION, SOLUTION SUBCUTANEOUS DAILY
Qty: 18 ML | Refills: 3 | Status: SHIPPED | OUTPATIENT
Start: 2021-07-07 | End: 2021-11-30

## 2021-07-08 DIAGNOSIS — E11.22 TYPE 2 DIABETES MELLITUS WITH STAGE 3A CHRONIC KIDNEY DISEASE, WITH LONG-TERM CURRENT USE OF INSULIN: Primary | ICD-10-CM

## 2021-07-08 DIAGNOSIS — N18.31 TYPE 2 DIABETES MELLITUS WITH STAGE 3A CHRONIC KIDNEY DISEASE, WITH LONG-TERM CURRENT USE OF INSULIN: Primary | ICD-10-CM

## 2021-07-08 DIAGNOSIS — Z79.4 TYPE 2 DIABETES MELLITUS WITH STAGE 3A CHRONIC KIDNEY DISEASE, WITH LONG-TERM CURRENT USE OF INSULIN: Primary | ICD-10-CM

## 2021-07-12 ENCOUNTER — LAB VISIT (OUTPATIENT)
Dept: LAB | Facility: HOSPITAL | Age: 61
End: 2021-07-12
Attending: INTERNAL MEDICINE
Payer: MEDICAID

## 2021-07-12 DIAGNOSIS — M54.2 BILATERAL NECK PAIN: ICD-10-CM

## 2021-07-12 DIAGNOSIS — M06.00 SERONEGATIVE RHEUMATOID ARTHRITIS: ICD-10-CM

## 2021-07-12 DIAGNOSIS — R68.84 JAW PAIN: ICD-10-CM

## 2021-07-12 DIAGNOSIS — M35.00 SJOGREN'S SYNDROME, WITH UNSPECIFIED ORGAN INVOLVEMENT: ICD-10-CM

## 2021-07-12 LAB
ALBUMIN SERPL BCP-MCNC: 3.9 G/DL (ref 3.5–5.2)
ALP SERPL-CCNC: 48 U/L (ref 55–135)
ALT SERPL W/O P-5'-P-CCNC: 18 U/L (ref 10–44)
ANION GAP SERPL CALC-SCNC: 8 MMOL/L (ref 8–16)
AST SERPL-CCNC: 17 U/L (ref 10–40)
BASOPHILS # BLD AUTO: 0.05 K/UL (ref 0–0.2)
BASOPHILS NFR BLD: 1.1 % (ref 0–1.9)
BILIRUB SERPL-MCNC: 0.3 MG/DL (ref 0.1–1)
BUN SERPL-MCNC: 20 MG/DL (ref 8–23)
CALCIUM SERPL-MCNC: 9.5 MG/DL (ref 8.7–10.5)
CHLORIDE SERPL-SCNC: 105 MMOL/L (ref 95–110)
CO2 SERPL-SCNC: 27 MMOL/L (ref 23–29)
CREAT SERPL-MCNC: 1.2 MG/DL (ref 0.5–1.4)
CRP SERPL-MCNC: 0.8 MG/L (ref 0–8.2)
DIFFERENTIAL METHOD: ABNORMAL
EOSINOPHIL # BLD AUTO: 0.4 K/UL (ref 0–0.5)
EOSINOPHIL NFR BLD: 8.8 % (ref 0–8)
ERYTHROCYTE [DISTWIDTH] IN BLOOD BY AUTOMATED COUNT: 12.6 % (ref 11.5–14.5)
ERYTHROCYTE [SEDIMENTATION RATE] IN BLOOD BY WESTERGREN METHOD: 10 MM/HR (ref 0–20)
EST. GFR  (AFRICAN AMERICAN): 55.6 ML/MIN/1.73 M^2
EST. GFR  (NON AFRICAN AMERICAN): 48.2 ML/MIN/1.73 M^2
GLUCOSE SERPL-MCNC: 194 MG/DL (ref 70–110)
HCT VFR BLD AUTO: 35.1 % (ref 37–48.5)
HGB BLD-MCNC: 11.5 G/DL (ref 12–16)
IMM GRANULOCYTES # BLD AUTO: 0.01 K/UL (ref 0–0.04)
IMM GRANULOCYTES NFR BLD AUTO: 0.2 % (ref 0–0.5)
LYMPHOCYTES # BLD AUTO: 1.2 K/UL (ref 1–4.8)
LYMPHOCYTES NFR BLD: 27 % (ref 18–48)
MCH RBC QN AUTO: 31 PG (ref 27–31)
MCHC RBC AUTO-ENTMCNC: 32.8 G/DL (ref 32–36)
MCV RBC AUTO: 95 FL (ref 82–98)
MONOCYTES # BLD AUTO: 0.4 K/UL (ref 0.3–1)
MONOCYTES NFR BLD: 9.3 % (ref 4–15)
NEUTROPHILS # BLD AUTO: 2.4 K/UL (ref 1.8–7.7)
NEUTROPHILS NFR BLD: 53.6 % (ref 38–73)
NRBC BLD-RTO: 0 /100 WBC
PLATELET # BLD AUTO: 221 K/UL (ref 150–450)
PMV BLD AUTO: 11.6 FL (ref 9.2–12.9)
POTASSIUM SERPL-SCNC: 4.6 MMOL/L (ref 3.5–5.1)
PROT SERPL-MCNC: 6.7 G/DL (ref 6–8.4)
RBC # BLD AUTO: 3.71 M/UL (ref 4–5.4)
SODIUM SERPL-SCNC: 140 MMOL/L (ref 136–145)
T4 FREE SERPL-MCNC: 0.81 NG/DL (ref 0.71–1.51)
TSH SERPL DL<=0.005 MIU/L-ACNC: 2.44 UIU/ML (ref 0.4–4)
WBC # BLD AUTO: 4.52 K/UL (ref 3.9–12.7)

## 2021-07-12 PROCEDURE — 86140 C-REACTIVE PROTEIN: CPT | Performed by: INTERNAL MEDICINE

## 2021-07-12 PROCEDURE — 84439 ASSAY OF FREE THYROXINE: CPT | Performed by: INTERNAL MEDICINE

## 2021-07-12 PROCEDURE — 36415 COLL VENOUS BLD VENIPUNCTURE: CPT | Mod: PO | Performed by: INTERNAL MEDICINE

## 2021-07-12 PROCEDURE — 86038 ANTINUCLEAR ANTIBODIES: CPT | Performed by: INTERNAL MEDICINE

## 2021-07-12 PROCEDURE — 84443 ASSAY THYROID STIM HORMONE: CPT | Performed by: INTERNAL MEDICINE

## 2021-07-12 PROCEDURE — 80053 COMPREHEN METABOLIC PANEL: CPT | Performed by: INTERNAL MEDICINE

## 2021-07-12 PROCEDURE — 85651 RBC SED RATE NONAUTOMATED: CPT | Mod: PO | Performed by: INTERNAL MEDICINE

## 2021-07-12 PROCEDURE — 86039 ANTINUCLEAR ANTIBODIES (ANA): CPT | Performed by: INTERNAL MEDICINE

## 2021-07-12 PROCEDURE — 86235 NUCLEAR ANTIGEN ANTIBODY: CPT | Mod: 59 | Performed by: INTERNAL MEDICINE

## 2021-07-12 PROCEDURE — 85025 COMPLETE CBC W/AUTO DIFF WBC: CPT | Performed by: INTERNAL MEDICINE

## 2021-07-13 LAB
ANA PATTERN 1: NORMAL
ANA SER QL IF: POSITIVE
ANA TITR SER IF: NORMAL {TITER}

## 2021-07-15 LAB
ANTI SM ANTIBODY: 0.07 RATIO (ref 0–0.99)
ANTI SM/RNP ANTIBODY: 0.07 RATIO (ref 0–0.99)
ANTI-SM INTERPRETATION: NEGATIVE
ANTI-SM/RNP INTERPRETATION: NEGATIVE
ANTI-SSA ANTIBODY: 0.06 RATIO (ref 0–0.99)
ANTI-SSA INTERPRETATION: NEGATIVE
ANTI-SSB ANTIBODY: 0.06 RATIO (ref 0–0.99)
ANTI-SSB INTERPRETATION: NEGATIVE
DSDNA AB SER-ACNC: NORMAL [IU]/ML

## 2021-07-19 ENCOUNTER — OFFICE VISIT (OUTPATIENT)
Dept: RHEUMATOLOGY | Facility: CLINIC | Age: 61
End: 2021-07-19
Payer: MEDICAID

## 2021-07-19 VITALS
BODY MASS INDEX: 38.76 KG/M2 | SYSTOLIC BLOOD PRESSURE: 138 MMHG | DIASTOLIC BLOOD PRESSURE: 74 MMHG | HEART RATE: 89 BPM | WEIGHT: 227 LBS | HEIGHT: 64 IN

## 2021-07-19 DIAGNOSIS — M47.812 CERVICAL ARTHRITIS: ICD-10-CM

## 2021-07-19 DIAGNOSIS — Z79.899 HIGH RISK MEDICATION USE: ICD-10-CM

## 2021-07-19 DIAGNOSIS — M35.00 SJOGREN'S SYNDROME, WITH UNSPECIFIED ORGAN INVOLVEMENT: ICD-10-CM

## 2021-07-19 DIAGNOSIS — M06.00 SERONEGATIVE RHEUMATOID ARTHRITIS: Primary | ICD-10-CM

## 2021-07-19 PROCEDURE — 99999 PR PBB SHADOW E&M-EST. PATIENT-LVL V: CPT | Mod: PBBFAC,,, | Performed by: PHYSICIAN ASSISTANT

## 2021-07-19 PROCEDURE — 99214 PR OFFICE/OUTPT VISIT, EST, LEVL IV, 30-39 MIN: ICD-10-PCS | Mod: S$PBB,,, | Performed by: PHYSICIAN ASSISTANT

## 2021-07-19 PROCEDURE — 99999 PR PBB SHADOW E&M-EST. PATIENT-LVL V: ICD-10-PCS | Mod: PBBFAC,,, | Performed by: PHYSICIAN ASSISTANT

## 2021-07-19 PROCEDURE — 99215 OFFICE O/P EST HI 40 MIN: CPT | Mod: PBBFAC,PN | Performed by: PHYSICIAN ASSISTANT

## 2021-07-19 PROCEDURE — 99214 OFFICE O/P EST MOD 30 MIN: CPT | Mod: S$PBB,,, | Performed by: PHYSICIAN ASSISTANT

## 2021-07-19 RX ORDER — HYDROXYCHLOROQUINE SULFATE 200 MG/1
200 TABLET, FILM COATED ORAL 2 TIMES DAILY
Qty: 60 TABLET | Refills: 6 | Status: SHIPPED | OUTPATIENT
Start: 2021-07-19 | End: 2022-02-15 | Stop reason: SDUPTHER

## 2021-07-19 RX ORDER — HYDROCORTISONE 25 MG/G
CREAM TOPICAL 2 TIMES DAILY
COMMUNITY
Start: 2021-07-01

## 2021-07-19 RX ORDER — MELOXICAM 7.5 MG/1
7.5 TABLET ORAL DAILY
Qty: 30 TABLET | Refills: 6 | Status: SHIPPED | OUTPATIENT
Start: 2021-07-19 | End: 2021-10-21 | Stop reason: SDUPTHER

## 2021-07-19 ASSESSMENT — ROUTINE ASSESSMENT OF PATIENT INDEX DATA (RAPID3)
TOTAL RAPID3 SCORE: 4.78
FATIGUE SCORE: 1.1
PSYCHOLOGICAL DISTRESS SCORE: 0
PATIENT GLOBAL ASSESSMENT SCORE: 5
MDHAQ FUNCTION SCORE: 1.3
PAIN SCORE: 5

## 2021-08-18 ENCOUNTER — TELEPHONE (OUTPATIENT)
Dept: ENDOCRINOLOGY | Facility: CLINIC | Age: 61
End: 2021-08-18

## 2021-08-20 RX ORDER — PEN NEEDLE, DIABETIC 31 GX5/16"
NEEDLE, DISPOSABLE MISCELLANEOUS
Qty: 400 EACH | Refills: 3 | Status: SHIPPED | OUTPATIENT
Start: 2021-08-20 | End: 2022-08-10 | Stop reason: SDUPTHER

## 2021-11-08 ENCOUNTER — LAB VISIT (OUTPATIENT)
Dept: LAB | Facility: HOSPITAL | Age: 61
End: 2021-11-08
Attending: PHYSICIAN ASSISTANT
Payer: MEDICAID

## 2021-11-08 DIAGNOSIS — M35.00 SJOGREN'S SYNDROME, WITH UNSPECIFIED ORGAN INVOLVEMENT: ICD-10-CM

## 2021-11-08 DIAGNOSIS — M06.00 SERONEGATIVE RHEUMATOID ARTHRITIS: ICD-10-CM

## 2021-11-08 LAB
ALBUMIN SERPL BCP-MCNC: 3.9 G/DL (ref 3.5–5.2)
ALP SERPL-CCNC: 60 U/L (ref 55–135)
ALT SERPL W/O P-5'-P-CCNC: 17 U/L (ref 10–44)
ANION GAP SERPL CALC-SCNC: 10 MMOL/L (ref 8–16)
AST SERPL-CCNC: 15 U/L (ref 10–40)
BASOPHILS # BLD AUTO: 0.03 K/UL (ref 0–0.2)
BASOPHILS NFR BLD: 0.7 % (ref 0–1.9)
BILIRUB SERPL-MCNC: 0.4 MG/DL (ref 0.1–1)
BUN SERPL-MCNC: 27 MG/DL (ref 8–23)
CALCIUM SERPL-MCNC: 9.4 MG/DL (ref 8.7–10.5)
CHLORIDE SERPL-SCNC: 104 MMOL/L (ref 95–110)
CO2 SERPL-SCNC: 23 MMOL/L (ref 23–29)
CREAT SERPL-MCNC: 1.4 MG/DL (ref 0.5–1.4)
CRP SERPL-MCNC: 1.7 MG/L (ref 0–8.2)
DIFFERENTIAL METHOD: ABNORMAL
EOSINOPHIL # BLD AUTO: 0.3 K/UL (ref 0–0.5)
EOSINOPHIL NFR BLD: 7.1 % (ref 0–8)
ERYTHROCYTE [DISTWIDTH] IN BLOOD BY AUTOMATED COUNT: 12.3 % (ref 11.5–14.5)
ERYTHROCYTE [SEDIMENTATION RATE] IN BLOOD BY WESTERGREN METHOD: 14 MM/HR (ref 0–20)
EST. GFR  (AFRICAN AMERICAN): 46.1 ML/MIN/1.73 M^2
EST. GFR  (NON AFRICAN AMERICAN): 40 ML/MIN/1.73 M^2
GLUCOSE SERPL-MCNC: 229 MG/DL (ref 70–110)
HCT VFR BLD AUTO: 35.7 % (ref 37–48.5)
HGB BLD-MCNC: 11.8 G/DL (ref 12–16)
IMM GRANULOCYTES # BLD AUTO: 0.03 K/UL (ref 0–0.04)
IMM GRANULOCYTES NFR BLD AUTO: 0.7 % (ref 0–0.5)
LYMPHOCYTES # BLD AUTO: 1 K/UL (ref 1–4.8)
LYMPHOCYTES NFR BLD: 21.6 % (ref 18–48)
MCH RBC QN AUTO: 29.9 PG (ref 27–31)
MCHC RBC AUTO-ENTMCNC: 33.1 G/DL (ref 32–36)
MCV RBC AUTO: 90 FL (ref 82–98)
MONOCYTES # BLD AUTO: 0.4 K/UL (ref 0.3–1)
MONOCYTES NFR BLD: 8.6 % (ref 4–15)
NEUTROPHILS # BLD AUTO: 2.8 K/UL (ref 1.8–7.7)
NEUTROPHILS NFR BLD: 61.3 % (ref 38–73)
NRBC BLD-RTO: 0 /100 WBC
PLATELET # BLD AUTO: 208 K/UL (ref 150–450)
PMV BLD AUTO: 11 FL (ref 9.2–12.9)
POTASSIUM SERPL-SCNC: 4.6 MMOL/L (ref 3.5–5.1)
PROT SERPL-MCNC: 7.3 G/DL (ref 6–8.4)
RBC # BLD AUTO: 3.95 M/UL (ref 4–5.4)
SODIUM SERPL-SCNC: 137 MMOL/L (ref 136–145)
WBC # BLD AUTO: 4.53 K/UL (ref 3.9–12.7)

## 2021-11-08 PROCEDURE — 80053 COMPREHEN METABOLIC PANEL: CPT | Performed by: PHYSICIAN ASSISTANT

## 2021-11-08 PROCEDURE — 36415 COLL VENOUS BLD VENIPUNCTURE: CPT | Mod: PO | Performed by: PHYSICIAN ASSISTANT

## 2021-11-08 PROCEDURE — 86140 C-REACTIVE PROTEIN: CPT | Performed by: PHYSICIAN ASSISTANT

## 2021-11-08 PROCEDURE — 85025 COMPLETE CBC W/AUTO DIFF WBC: CPT | Performed by: PHYSICIAN ASSISTANT

## 2021-11-08 PROCEDURE — 85651 RBC SED RATE NONAUTOMATED: CPT | Mod: PO | Performed by: PHYSICIAN ASSISTANT

## 2021-11-18 ENCOUNTER — OFFICE VISIT (OUTPATIENT)
Dept: RHEUMATOLOGY | Facility: CLINIC | Age: 61
End: 2021-11-18
Payer: MEDICAID

## 2021-11-18 VITALS
DIASTOLIC BLOOD PRESSURE: 74 MMHG | SYSTOLIC BLOOD PRESSURE: 159 MMHG | WEIGHT: 227.06 LBS | HEART RATE: 79 BPM | BODY MASS INDEX: 38.76 KG/M2 | HEIGHT: 64 IN

## 2021-11-18 DIAGNOSIS — M54.50 LUMBAR BACK PAIN: ICD-10-CM

## 2021-11-18 DIAGNOSIS — M17.0 PRIMARY OSTEOARTHRITIS OF BOTH KNEES: ICD-10-CM

## 2021-11-18 DIAGNOSIS — M47.812 CERVICAL ARTHRITIS: ICD-10-CM

## 2021-11-18 DIAGNOSIS — E86.0 DEHYDRATION: ICD-10-CM

## 2021-11-18 DIAGNOSIS — Z79.899 HIGH RISK MEDICATION USE: ICD-10-CM

## 2021-11-18 DIAGNOSIS — R76.8 ANA POSITIVE: ICD-10-CM

## 2021-11-18 DIAGNOSIS — N18.32 CHRONIC RENAL IMPAIRMENT, STAGE 3B: ICD-10-CM

## 2021-11-18 DIAGNOSIS — M06.00 SERONEGATIVE RHEUMATOID ARTHRITIS: Primary | ICD-10-CM

## 2021-11-18 PROCEDURE — 99215 OFFICE O/P EST HI 40 MIN: CPT | Mod: PBBFAC,PN | Performed by: INTERNAL MEDICINE

## 2021-11-18 PROCEDURE — 99215 OFFICE O/P EST HI 40 MIN: CPT | Mod: S$PBB,,, | Performed by: INTERNAL MEDICINE

## 2021-11-18 PROCEDURE — 99999 PR PBB SHADOW E&M-EST. PATIENT-LVL V: CPT | Mod: PBBFAC,,, | Performed by: INTERNAL MEDICINE

## 2021-11-18 PROCEDURE — 99215 PR OFFICE/OUTPT VISIT, EST, LEVL V, 40-54 MIN: ICD-10-PCS | Mod: S$PBB,,, | Performed by: INTERNAL MEDICINE

## 2021-11-18 PROCEDURE — 99999 PR PBB SHADOW E&M-EST. PATIENT-LVL V: ICD-10-PCS | Mod: PBBFAC,,, | Performed by: INTERNAL MEDICINE

## 2021-11-18 RX ORDER — SUCRALFATE 1 G/1
1 TABLET ORAL 4 TIMES DAILY
COMMUNITY
Start: 2021-11-17 | End: 2021-11-18

## 2021-11-18 RX ORDER — CHLORTHALIDONE 25 MG/1
25 TABLET ORAL DAILY
Qty: 30 TABLET | Refills: 5 | Status: SHIPPED | OUTPATIENT
Start: 2021-11-18 | End: 2022-04-16

## 2021-11-18 ASSESSMENT — ROUTINE ASSESSMENT OF PATIENT INDEX DATA (RAPID3)
TOTAL RAPID3 SCORE: 2.61
FATIGUE SCORE: 1.1
PSYCHOLOGICAL DISTRESS SCORE: 0
MDHAQ FUNCTION SCORE: 0.7
PAIN SCORE: 3.5
PATIENT GLOBAL ASSESSMENT SCORE: 2

## 2022-01-04 ENCOUNTER — LAB VISIT (OUTPATIENT)
Dept: LAB | Facility: HOSPITAL | Age: 62
End: 2022-01-04
Attending: NURSE PRACTITIONER
Payer: MEDICAID

## 2022-01-04 LAB
ALBUMIN SERPL BCP-MCNC: 3.9 G/DL (ref 3.5–5.2)
ALP SERPL-CCNC: 54 U/L (ref 55–135)
ALT SERPL W/O P-5'-P-CCNC: 25 U/L (ref 10–44)
ANION GAP SERPL CALC-SCNC: 7 MMOL/L (ref 8–16)
AST SERPL-CCNC: 17 U/L (ref 10–40)
BILIRUB SERPL-MCNC: 0.3 MG/DL (ref 0.1–1)
BUN SERPL-MCNC: 32 MG/DL (ref 8–23)
CALCIUM SERPL-MCNC: 10.3 MG/DL (ref 8.7–10.5)
CHLORIDE SERPL-SCNC: 102 MMOL/L (ref 95–110)
CHOLEST SERPL-MCNC: 142 MG/DL (ref 120–199)
CHOLEST/HDLC SERPL: 2.7 {RATIO} (ref 2–5)
CO2 SERPL-SCNC: 29 MMOL/L (ref 23–29)
CREAT SERPL-MCNC: 1.4 MG/DL (ref 0.5–1.4)
EST. GFR  (AFRICAN AMERICAN): 46.1 ML/MIN/1.73 M^2
EST. GFR  (NON AFRICAN AMERICAN): 40 ML/MIN/1.73 M^2
ESTIMATED AVG GLUCOSE: 183 MG/DL (ref 68–131)
GLUCOSE SERPL-MCNC: 211 MG/DL (ref 70–110)
HBA1C MFR BLD: 8 % (ref 4–5.6)
HDLC SERPL-MCNC: 53 MG/DL (ref 40–75)
HDLC SERPL: 37.3 % (ref 20–50)
LDLC SERPL CALC-MCNC: 57.6 MG/DL (ref 63–159)
NONHDLC SERPL-MCNC: 89 MG/DL
POTASSIUM SERPL-SCNC: 4.8 MMOL/L (ref 3.5–5.1)
PROT SERPL-MCNC: 7.1 G/DL (ref 6–8.4)
SODIUM SERPL-SCNC: 138 MMOL/L (ref 136–145)
TRIGL SERPL-MCNC: 157 MG/DL (ref 30–150)

## 2022-01-04 PROCEDURE — 83036 HEMOGLOBIN GLYCOSYLATED A1C: CPT | Performed by: NURSE PRACTITIONER

## 2022-01-04 PROCEDURE — 36415 COLL VENOUS BLD VENIPUNCTURE: CPT | Mod: PO | Performed by: NURSE PRACTITIONER

## 2022-01-04 PROCEDURE — 80053 COMPREHEN METABOLIC PANEL: CPT | Performed by: NURSE PRACTITIONER

## 2022-01-04 PROCEDURE — 80061 LIPID PANEL: CPT | Performed by: NURSE PRACTITIONER

## 2022-02-09 ENCOUNTER — OFFICE VISIT (OUTPATIENT)
Dept: ENDOCRINOLOGY | Facility: CLINIC | Age: 62
End: 2022-02-09
Payer: MEDICAID

## 2022-02-09 VITALS
HEART RATE: 87 BPM | SYSTOLIC BLOOD PRESSURE: 122 MMHG | BODY MASS INDEX: 40.34 KG/M2 | DIASTOLIC BLOOD PRESSURE: 58 MMHG | WEIGHT: 236.31 LBS | HEIGHT: 64 IN

## 2022-02-09 DIAGNOSIS — Z79.4 TYPE 2 DIABETES MELLITUS WITH STAGE 3A CHRONIC KIDNEY DISEASE, WITH LONG-TERM CURRENT USE OF INSULIN: Primary | ICD-10-CM

## 2022-02-09 DIAGNOSIS — E78.2 MIXED HYPERLIPIDEMIA: ICD-10-CM

## 2022-02-09 DIAGNOSIS — N18.31 TYPE 2 DIABETES MELLITUS WITH STAGE 3A CHRONIC KIDNEY DISEASE, WITH LONG-TERM CURRENT USE OF INSULIN: Primary | ICD-10-CM

## 2022-02-09 DIAGNOSIS — E11.22 TYPE 2 DIABETES MELLITUS WITH STAGE 3A CHRONIC KIDNEY DISEASE, WITH LONG-TERM CURRENT USE OF INSULIN: Primary | ICD-10-CM

## 2022-02-09 DIAGNOSIS — I10 ESSENTIAL HYPERTENSION: ICD-10-CM

## 2022-02-09 DIAGNOSIS — E66.01 MORBID OBESITY WITH BMI OF 40.0-44.9, ADULT: ICD-10-CM

## 2022-02-09 PROCEDURE — 3074F SYST BP LT 130 MM HG: CPT | Mod: CPTII,,, | Performed by: NURSE PRACTITIONER

## 2022-02-09 PROCEDURE — 99999 PR PBB SHADOW E&M-EST. PATIENT-LVL IV: CPT | Mod: PBBFAC,,, | Performed by: NURSE PRACTITIONER

## 2022-02-09 PROCEDURE — 3078F DIAST BP <80 MM HG: CPT | Mod: CPTII,,, | Performed by: NURSE PRACTITIONER

## 2022-02-09 PROCEDURE — 3008F PR BODY MASS INDEX (BMI) DOCUMENTED: ICD-10-PCS | Mod: CPTII,,, | Performed by: NURSE PRACTITIONER

## 2022-02-09 PROCEDURE — 1159F PR MEDICATION LIST DOCUMENTED IN MEDICAL RECORD: ICD-10-PCS | Mod: CPTII,,, | Performed by: NURSE PRACTITIONER

## 2022-02-09 PROCEDURE — 3078F PR MOST RECENT DIASTOLIC BLOOD PRESSURE < 80 MM HG: ICD-10-PCS | Mod: CPTII,,, | Performed by: NURSE PRACTITIONER

## 2022-02-09 PROCEDURE — 99214 OFFICE O/P EST MOD 30 MIN: CPT | Mod: PBBFAC,PO | Performed by: NURSE PRACTITIONER

## 2022-02-09 PROCEDURE — 3066F PR DOCUMENTATION OF TREATMENT FOR NEPHROPATHY: ICD-10-PCS | Mod: CPTII,,, | Performed by: NURSE PRACTITIONER

## 2022-02-09 PROCEDURE — 99999 PR PBB SHADOW E&M-EST. PATIENT-LVL IV: ICD-10-PCS | Mod: PBBFAC,,, | Performed by: NURSE PRACTITIONER

## 2022-02-09 PROCEDURE — 3074F PR MOST RECENT SYSTOLIC BLOOD PRESSURE < 130 MM HG: ICD-10-PCS | Mod: CPTII,,, | Performed by: NURSE PRACTITIONER

## 2022-02-09 PROCEDURE — 99214 PR OFFICE/OUTPT VISIT, EST, LEVL IV, 30-39 MIN: ICD-10-PCS | Mod: S$PBB,,, | Performed by: NURSE PRACTITIONER

## 2022-02-09 PROCEDURE — 3052F PR MOST RECENT HEMOGLOBIN A1C LEVEL 8.0 - < 9.0%: ICD-10-PCS | Mod: CPTII,,, | Performed by: NURSE PRACTITIONER

## 2022-02-09 PROCEDURE — 1159F MED LIST DOCD IN RCRD: CPT | Mod: CPTII,,, | Performed by: NURSE PRACTITIONER

## 2022-02-09 PROCEDURE — 3061F PR NEG MICROALBUMINURIA RESULT DOCUMENTED/REVIEW: ICD-10-PCS | Mod: CPTII,,, | Performed by: NURSE PRACTITIONER

## 2022-02-09 PROCEDURE — 3052F HG A1C>EQUAL 8.0%<EQUAL 9.0%: CPT | Mod: CPTII,,, | Performed by: NURSE PRACTITIONER

## 2022-02-09 PROCEDURE — 3066F NEPHROPATHY DOC TX: CPT | Mod: CPTII,,, | Performed by: NURSE PRACTITIONER

## 2022-02-09 PROCEDURE — 3061F NEG MICROALBUMINURIA REV: CPT | Mod: CPTII,,, | Performed by: NURSE PRACTITIONER

## 2022-02-09 PROCEDURE — 99214 OFFICE O/P EST MOD 30 MIN: CPT | Mod: S$PBB,,, | Performed by: NURSE PRACTITIONER

## 2022-02-09 PROCEDURE — 3008F BODY MASS INDEX DOCD: CPT | Mod: CPTII,,, | Performed by: NURSE PRACTITIONER

## 2022-02-09 NOTE — PROGRESS NOTES
Patient ID: Patricia Fournier is a 61 y.o. female.    Chief Complaint: Diabetes    HPI:  Pt is a 61 y.o. wf  with a diagnosis of Type 2 diabetes mellitus diagnosed approximately 2000 , as well as chronic conditions pending review including HTN, HLP.  Other pertinent medical and social information noted includes, but not limited to: morbid obesity. Pleasant. Good historian. 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      Interim Events:  NO acute events or focal complaints. No c/o hypoglycemia.  Reviewed logs.   She rarely needs sliding scale. States was bad over the holidays, but back on track.     Victoza 1.8 mg weekly.  Lanuts 60 once daily.  Metformin 1000 mg BID.     Took Trulicity in the past and had hypoglycemia. BG was 40. She passed out, had ? seizure and went to the hospital. She was also taking insulin at the time. No history of pancreatitis.                         Review of Systems   Constitutional: Negative for activity change and fatigue (fair ).   HENT: Negative for hearing loss and trouble swallowing.    Eyes: Negative for photophobia and visual disturbance.        Last Eye Exam: appt June    Respiratory: Negative for cough and shortness of breath.    Cardiovascular: Negative for chest pain and palpitations.   Gastrointestinal: Negative for constipation and diarrhea.   Genitourinary: Negative for frequency and urgency.   Musculoskeletal: Negative for arthralgias and myalgias.   Integumentary:  Negative for rash and wound.   Neurological: Negative for weakness and numbness.   Psychiatric/Behavioral: Negative for sleep disturbance. The patient is not nervous/anxious.          Objective:      Physical Exam  Constitutional:       Appearance: Normal appearance. She is obese.   HENT:      Head: Normocephalic and atraumatic.      Nose: Nose normal.      Mouth/Throat:      Mouth: Mucous membranes are moist.      Pharynx: Oropharynx is clear.   Eyes:      Extraocular Movements: Extraocular movements intact.       Conjunctiva/sclera: Conjunctivae normal.      Pupils: Pupils are equal, round, and reactive to light.   Cardiovascular:      Rate and Rhythm: Normal rate and regular rhythm.      Heart sounds: Normal heart sounds.   Pulmonary:      Effort: Pulmonary effort is normal.      Breath sounds: Normal breath sounds.   Musculoskeletal:         General: Normal range of motion.      Cervical back: Normal range of motion and neck supple.      Right lower leg: No edema.      Left lower leg: No edema.      Comments: Feet:   Lymphadenopathy:      Cervical: No cervical adenopathy.   Skin:     General: Skin is warm and dry.   Neurological:      General: No focal deficit present.      Mental Status: She is alert and oriented to person, place, and time.   Psychiatric:         Mood and Affect: Mood normal.         Behavior: Behavior normal.         Thought Content: Thought content normal.         Judgment: Judgment normal.         Hemoglobin A1C   Date Value Ref Range Status   01/04/2022 8.0 (H) 4.0 - 5.6 % Final     Comment:     ADA Screening Guidelines:  5.7-6.4%  Consistent with prediabetes  >or=6.5%  Consistent with diabetes    High levels of fetal hemoglobin interfere with the HbA1C  assay. Heterozygous hemoglobin variants (HbS, HgC, etc)do  not significantly interfere with this assay.   However, presence of multiple variants may affect accuracy.     06/30/2021 6.8 (H) 4.0 - 5.6 % Final     Comment:     ADA Screening Guidelines:  5.7-6.4%  Consistent with prediabetes  >or=6.5%  Consistent with diabetes    High levels of fetal hemoglobin interfere with the HbA1C  assay. Heterozygous hemoglobin variants (HbS, HgC, etc)do  not significantly interfere with this assay.   However, presence of multiple variants may affect accuracy.     03/10/2021 6.5 (H) 4.0 - 5.6 % Final     Comment:     ADA Screening Guidelines:  5.7-6.4%  Consistent with prediabetes  >or=6.5%  Consistent with diabetes    High levels of fetal hemoglobin interfere with  the HbA1C  assay. Heterozygous hemoglobin variants (HbS, HgC, etc)do  not significantly interfere with this assay.   However, presence of multiple variants may affect accuracy.         Chemistry        Component Value Date/Time     01/04/2022 0750     (L) 08/25/2015 0855    K 4.8 01/04/2022 0750    K 4.3 08/25/2015 0855     01/04/2022 0750     08/25/2015 0855    CO2 29 01/04/2022 0750    BUN 32 (H) 01/04/2022 0750    CREATININE 1.4 01/04/2022 0750    CREATININE 0.52 08/25/2015 0855     (H) 01/04/2022 0750        Component Value Date/Time    CALCIUM 10.3 01/04/2022 0750    ALKPHOS 54 (L) 01/04/2022 0750    AST 17 01/04/2022 0750    ALT 25 01/04/2022 0750    BILITOT 0.3 01/04/2022 0750    ESTGFRAFRICA 46.1 (A) 01/04/2022 0750    EGFRNONAA 40.0 (A) 01/04/2022 0750          Lab Results   Component Value Date    LDLCALC 57.6 (L) 01/04/2022     Lab Results   Component Value Date    TSH 2.443 07/12/2021     No components found for: [id number]  No results found for: MICROALBUR, [id number]   Component      Latest Ref Rng & Units 3/10/2021   Microalbumin, Urine      ug/mL 4.0   Creatinine, Urine      15.0 - 325.0 mg/dL 55.0   MICROALB/CREAT RATIO      0.0 - 30.0 ug/mg 7.3     Assessment:       1. Type 2 diabetes mellitus with stage 3a chronic kidney disease, with long-term current use of insulin     2. Essential hypertension  lisinopriL (PRINIVIL,ZESTRIL) 20 MG tablet   3. Mixed hyperlipidemia  pravastatin (PRAVACHOL) 40 MG tablet   4. Stage 3a chronic kidney disease     5. Morbid obesity with BMI of 40.0-44.9, adult  Previously Counseled on options for exercise. --again reinforcec       Plan:       Reinforced SS dose only. COunseling on prandial dose vs correction dose.   contintue 60 lantus  Cont metformin 1000 bid.   Cont victoza 1.8mg     Cont ace, statin     A1C is 2 mo old,  Current glucoses appear better. No changes.   ORDERS 02/09/2022     a1c only in 3 months--mail/call pt with  results.     6 mo with me with a1c prior

## 2022-02-14 ENCOUNTER — LAB VISIT (OUTPATIENT)
Dept: LAB | Facility: HOSPITAL | Age: 62
End: 2022-02-14
Attending: INTERNAL MEDICINE
Payer: MEDICAID

## 2022-02-14 DIAGNOSIS — E86.0 DEHYDRATION: ICD-10-CM

## 2022-02-14 DIAGNOSIS — M06.00 SERONEGATIVE RHEUMATOID ARTHRITIS: ICD-10-CM

## 2022-02-14 DIAGNOSIS — M54.50 LUMBAR BACK PAIN: ICD-10-CM

## 2022-02-14 DIAGNOSIS — M47.812 CERVICAL ARTHRITIS: ICD-10-CM

## 2022-02-14 DIAGNOSIS — M17.0 PRIMARY OSTEOARTHRITIS OF BOTH KNEES: ICD-10-CM

## 2022-02-14 DIAGNOSIS — Z79.899 HIGH RISK MEDICATION USE: ICD-10-CM

## 2022-02-14 DIAGNOSIS — N18.32 CHRONIC RENAL IMPAIRMENT, STAGE 3B: ICD-10-CM

## 2022-02-14 DIAGNOSIS — R76.8 ANA POSITIVE: ICD-10-CM

## 2022-02-14 LAB
ALBUMIN SERPL BCP-MCNC: 3.8 G/DL (ref 3.5–5.2)
ALP SERPL-CCNC: 54 U/L (ref 55–135)
ALT SERPL W/O P-5'-P-CCNC: 21 U/L (ref 10–44)
ANION GAP SERPL CALC-SCNC: 11 MMOL/L (ref 8–16)
AST SERPL-CCNC: 17 U/L (ref 10–40)
BASOPHILS # BLD AUTO: 0.03 K/UL (ref 0–0.2)
BASOPHILS NFR BLD: 0.6 % (ref 0–1.9)
BILIRUB SERPL-MCNC: 0.2 MG/DL (ref 0.1–1)
BUN SERPL-MCNC: 32 MG/DL (ref 8–23)
CALCIUM SERPL-MCNC: 9.5 MG/DL (ref 8.7–10.5)
CHLORIDE SERPL-SCNC: 105 MMOL/L (ref 95–110)
CO2 SERPL-SCNC: 24 MMOL/L (ref 23–29)
CREAT SERPL-MCNC: 1.7 MG/DL (ref 0.5–1.4)
CRP SERPL-MCNC: 0.8 MG/L (ref 0–8.2)
DIFFERENTIAL METHOD: ABNORMAL
EOSINOPHIL # BLD AUTO: 0.3 K/UL (ref 0–0.5)
EOSINOPHIL NFR BLD: 6.4 % (ref 0–8)
ERYTHROCYTE [DISTWIDTH] IN BLOOD BY AUTOMATED COUNT: 12.7 % (ref 11.5–14.5)
ERYTHROCYTE [SEDIMENTATION RATE] IN BLOOD BY WESTERGREN METHOD: 17 MM/HR (ref 0–36)
EST. GFR  (AFRICAN AMERICAN): 36.5 ML/MIN/1.73 M^2
EST. GFR  (NON AFRICAN AMERICAN): 31.6 ML/MIN/1.73 M^2
GLUCOSE SERPL-MCNC: 278 MG/DL (ref 70–110)
HCT VFR BLD AUTO: 34.5 % (ref 37–48.5)
HGB BLD-MCNC: 11.4 G/DL (ref 12–16)
IMM GRANULOCYTES # BLD AUTO: 0.01 K/UL (ref 0–0.04)
IMM GRANULOCYTES NFR BLD AUTO: 0.2 % (ref 0–0.5)
LYMPHOCYTES # BLD AUTO: 1.1 K/UL (ref 1–4.8)
LYMPHOCYTES NFR BLD: 23.9 % (ref 18–48)
MCH RBC QN AUTO: 31.2 PG (ref 27–31)
MCHC RBC AUTO-ENTMCNC: 33 G/DL (ref 32–36)
MCV RBC AUTO: 95 FL (ref 82–98)
MONOCYTES # BLD AUTO: 0.4 K/UL (ref 0.3–1)
MONOCYTES NFR BLD: 9.2 % (ref 4–15)
NEUTROPHILS # BLD AUTO: 2.8 K/UL (ref 1.8–7.7)
NEUTROPHILS NFR BLD: 59.7 % (ref 38–73)
NRBC BLD-RTO: 0 /100 WBC
PLATELET # BLD AUTO: 231 K/UL (ref 150–450)
PMV BLD AUTO: 11 FL (ref 9.2–12.9)
POTASSIUM SERPL-SCNC: 4.9 MMOL/L (ref 3.5–5.1)
PROT SERPL-MCNC: 7 G/DL (ref 6–8.4)
RBC # BLD AUTO: 3.65 M/UL (ref 4–5.4)
SODIUM SERPL-SCNC: 140 MMOL/L (ref 136–145)
T3FREE SERPL-MCNC: 2.3 PG/ML (ref 2.3–4.2)
T4 FREE SERPL-MCNC: 0.83 NG/DL (ref 0.71–1.51)
TSH SERPL DL<=0.005 MIU/L-ACNC: 2.12 UIU/ML (ref 0.4–4)
WBC # BLD AUTO: 4.68 K/UL (ref 3.9–12.7)

## 2022-02-14 PROCEDURE — 86039 ANTINUCLEAR ANTIBODIES (ANA): CPT | Performed by: INTERNAL MEDICINE

## 2022-02-14 PROCEDURE — 86225 DNA ANTIBODY NATIVE: CPT | Performed by: INTERNAL MEDICINE

## 2022-02-14 PROCEDURE — 86038 ANTINUCLEAR ANTIBODIES: CPT | Performed by: INTERNAL MEDICINE

## 2022-02-14 PROCEDURE — 85025 COMPLETE CBC W/AUTO DIFF WBC: CPT | Performed by: INTERNAL MEDICINE

## 2022-02-14 PROCEDURE — 84439 ASSAY OF FREE THYROXINE: CPT | Performed by: INTERNAL MEDICINE

## 2022-02-14 PROCEDURE — 86140 C-REACTIVE PROTEIN: CPT | Performed by: INTERNAL MEDICINE

## 2022-02-14 PROCEDURE — 86235 NUCLEAR ANTIGEN ANTIBODY: CPT | Mod: 59 | Performed by: INTERNAL MEDICINE

## 2022-02-14 PROCEDURE — 80053 COMPREHEN METABOLIC PANEL: CPT | Performed by: INTERNAL MEDICINE

## 2022-02-14 PROCEDURE — 36415 COLL VENOUS BLD VENIPUNCTURE: CPT | Mod: PO | Performed by: INTERNAL MEDICINE

## 2022-02-14 PROCEDURE — 84443 ASSAY THYROID STIM HORMONE: CPT | Performed by: INTERNAL MEDICINE

## 2022-02-14 PROCEDURE — 85652 RBC SED RATE AUTOMATED: CPT | Performed by: INTERNAL MEDICINE

## 2022-02-14 PROCEDURE — 84481 FREE ASSAY (FT-3): CPT | Performed by: INTERNAL MEDICINE

## 2022-02-15 DIAGNOSIS — M06.00 SERONEGATIVE RHEUMATOID ARTHRITIS: ICD-10-CM

## 2022-02-15 DIAGNOSIS — M35.00 SJOGREN'S SYNDROME, WITH UNSPECIFIED ORGAN INVOLVEMENT: ICD-10-CM

## 2022-02-15 LAB
ANA PATTERN 1: NORMAL
ANA SER QL IF: POSITIVE
ANA TITR SER IF: NORMAL {TITER}

## 2022-02-15 RX ORDER — HYDROXYCHLOROQUINE SULFATE 200 MG/1
200 TABLET, FILM COATED ORAL 2 TIMES DAILY
Qty: 60 TABLET | Refills: 6 | Status: SHIPPED | OUTPATIENT
Start: 2022-02-15 | End: 2022-09-12 | Stop reason: SDUPTHER

## 2022-02-15 NOTE — TELEPHONE ENCOUNTER
Pharmacy requesting refill on Hydroxychloroquine 200mg  Pt's LOV 11/18/2021  Pt's NOV 02/17/2022  Medication pending

## 2022-02-17 ENCOUNTER — OFFICE VISIT (OUTPATIENT)
Dept: RHEUMATOLOGY | Facility: CLINIC | Age: 62
End: 2022-02-17
Payer: MEDICAID

## 2022-02-17 VITALS
BODY MASS INDEX: 39.44 KG/M2 | HEART RATE: 85 BPM | HEIGHT: 64 IN | WEIGHT: 231 LBS | SYSTOLIC BLOOD PRESSURE: 130 MMHG | DIASTOLIC BLOOD PRESSURE: 61 MMHG

## 2022-02-17 DIAGNOSIS — N18.32 STAGE 3B CHRONIC KIDNEY DISEASE: ICD-10-CM

## 2022-02-17 DIAGNOSIS — M54.50 LUMBAR BACK PAIN: ICD-10-CM

## 2022-02-17 DIAGNOSIS — M47.812 CERVICAL ARTHRITIS: ICD-10-CM

## 2022-02-17 DIAGNOSIS — M06.00 SERONEGATIVE RHEUMATOID ARTHRITIS: Primary | ICD-10-CM

## 2022-02-17 LAB
ANTI SM ANTIBODY: 0.08 RATIO (ref 0–0.99)
ANTI SM/RNP ANTIBODY: 0.07 RATIO (ref 0–0.99)
ANTI-SM INTERPRETATION: NEGATIVE
ANTI-SM/RNP INTERPRETATION: NEGATIVE
ANTI-SSA ANTIBODY: 0.08 RATIO (ref 0–0.99)
ANTI-SSA INTERPRETATION: NEGATIVE
ANTI-SSB ANTIBODY: 0.06 RATIO (ref 0–0.99)
ANTI-SSB INTERPRETATION: NEGATIVE
DSDNA AB SER-ACNC: NORMAL [IU]/ML

## 2022-02-17 PROCEDURE — 3061F PR NEG MICROALBUMINURIA RESULT DOCUMENTED/REVIEW: ICD-10-PCS | Mod: CPTII,,, | Performed by: PHYSICIAN ASSISTANT

## 2022-02-17 PROCEDURE — 3008F BODY MASS INDEX DOCD: CPT | Mod: CPTII,,, | Performed by: PHYSICIAN ASSISTANT

## 2022-02-17 PROCEDURE — 99213 OFFICE O/P EST LOW 20 MIN: CPT | Mod: S$PBB,,, | Performed by: PHYSICIAN ASSISTANT

## 2022-02-17 PROCEDURE — 3052F PR MOST RECENT HEMOGLOBIN A1C LEVEL 8.0 - < 9.0%: ICD-10-PCS | Mod: CPTII,,, | Performed by: PHYSICIAN ASSISTANT

## 2022-02-17 PROCEDURE — 3066F NEPHROPATHY DOC TX: CPT | Mod: CPTII,,, | Performed by: PHYSICIAN ASSISTANT

## 2022-02-17 PROCEDURE — 3075F PR MOST RECENT SYSTOLIC BLOOD PRESS GE 130-139MM HG: ICD-10-PCS | Mod: CPTII,,, | Performed by: PHYSICIAN ASSISTANT

## 2022-02-17 PROCEDURE — 3066F PR DOCUMENTATION OF TREATMENT FOR NEPHROPATHY: ICD-10-PCS | Mod: CPTII,,, | Performed by: PHYSICIAN ASSISTANT

## 2022-02-17 PROCEDURE — 99999 PR PBB SHADOW E&M-EST. PATIENT-LVL III: CPT | Mod: PBBFAC,,, | Performed by: PHYSICIAN ASSISTANT

## 2022-02-17 PROCEDURE — 3078F PR MOST RECENT DIASTOLIC BLOOD PRESSURE < 80 MM HG: ICD-10-PCS | Mod: CPTII,,, | Performed by: PHYSICIAN ASSISTANT

## 2022-02-17 PROCEDURE — 3075F SYST BP GE 130 - 139MM HG: CPT | Mod: CPTII,,, | Performed by: PHYSICIAN ASSISTANT

## 2022-02-17 PROCEDURE — 99213 OFFICE O/P EST LOW 20 MIN: CPT | Mod: PBBFAC,PN | Performed by: PHYSICIAN ASSISTANT

## 2022-02-17 PROCEDURE — 99999 PR PBB SHADOW E&M-EST. PATIENT-LVL III: ICD-10-PCS | Mod: PBBFAC,,, | Performed by: PHYSICIAN ASSISTANT

## 2022-02-17 PROCEDURE — 4010F PR ACE/ARB THEARPY RXD/TAKEN: ICD-10-PCS | Mod: CPTII,,, | Performed by: PHYSICIAN ASSISTANT

## 2022-02-17 PROCEDURE — 1159F PR MEDICATION LIST DOCUMENTED IN MEDICAL RECORD: ICD-10-PCS | Mod: CPTII,,, | Performed by: PHYSICIAN ASSISTANT

## 2022-02-17 PROCEDURE — 3061F NEG MICROALBUMINURIA REV: CPT | Mod: CPTII,,, | Performed by: PHYSICIAN ASSISTANT

## 2022-02-17 PROCEDURE — 3078F DIAST BP <80 MM HG: CPT | Mod: CPTII,,, | Performed by: PHYSICIAN ASSISTANT

## 2022-02-17 PROCEDURE — 3008F PR BODY MASS INDEX (BMI) DOCUMENTED: ICD-10-PCS | Mod: CPTII,,, | Performed by: PHYSICIAN ASSISTANT

## 2022-02-17 PROCEDURE — 4010F ACE/ARB THERAPY RXD/TAKEN: CPT | Mod: CPTII,,, | Performed by: PHYSICIAN ASSISTANT

## 2022-02-17 PROCEDURE — 99213 PR OFFICE/OUTPT VISIT, EST, LEVL III, 20-29 MIN: ICD-10-PCS | Mod: S$PBB,,, | Performed by: PHYSICIAN ASSISTANT

## 2022-02-17 PROCEDURE — 3052F HG A1C>EQUAL 8.0%<EQUAL 9.0%: CPT | Mod: CPTII,,, | Performed by: PHYSICIAN ASSISTANT

## 2022-02-17 PROCEDURE — 1159F MED LIST DOCD IN RCRD: CPT | Mod: CPTII,,, | Performed by: PHYSICIAN ASSISTANT

## 2022-02-17 RX ORDER — METHOCARBAMOL 750 MG/1
750 TABLET, FILM COATED ORAL 2 TIMES DAILY PRN
Qty: 60 TABLET | Refills: 5 | Status: SHIPPED | OUTPATIENT
Start: 2022-02-17 | End: 2022-02-27

## 2022-02-17 RX ORDER — SUCRALFATE 1 G/1
1 TABLET ORAL 4 TIMES DAILY
COMMUNITY
Start: 2022-01-12 | End: 2023-01-25

## 2022-02-17 ASSESSMENT — ROUTINE ASSESSMENT OF PATIENT INDEX DATA (RAPID3)
PAIN SCORE: 4
TOTAL RAPID3 SCORE: 4.22
FATIGUE SCORE: 1.1
PSYCHOLOGICAL DISTRESS SCORE: 1.1
MDHAQ FUNCTION SCORE: 1.4
PATIENT GLOBAL ASSESSMENT SCORE: 4

## 2022-02-17 NOTE — Clinical Note
Please send my note and a copy of her recent CMP to her PCP Genevieve Haywood. I could not send via Epic. Please put Attention Genevieve Haywood: mutual patient abnormal labs

## 2022-02-17 NOTE — PROGRESS NOTES
Subjective:       Patient ID: Patricia Fournier is a 61 y.o. female.    Chief Complaint: Disease Management      Mrs. Fournier is a 61 year old female who presents to clinic for follow up on seronegative RA, osteoarthritis, sjogren's syndrome. She is doing fair overall. She continues to have stiffness in her hands, wrists, and ankles that is worse with the changing weather. AM stiffness typically lasts less than 30 minutes. She is taking plaquenil 200 mg twice daily, mobic 7.5mg, and robaxin nightly. She has bilateral wrist pain that is worse with picking up her grandchild. We reviewed her recent labs which showed further decline in her renal function. She admits she is not drinking water during the day. Nephrology eval is pending an appointment because of insurance.    Current tx:  1.  mg bid   2. mobic    Review of Systems   Constitutional: Positive for fatigue. Negative for activity change, appetite change, chills and fever.   Eyes: Negative for visual disturbance.   Respiratory: Negative for cough.    Cardiovascular: Negative for chest pain.   Gastrointestinal: Negative for abdominal pain, constipation, diarrhea, nausea and vomiting.   Musculoskeletal: Positive for arthralgias, back pain, joint swelling and neck pain.   Neurological: Negative for dizziness, weakness, light-headedness and headaches.         Objective:     Vitals:    22 0935   BP: 130/61   Pulse: 85       Past Medical History:   Diagnosis Date    Diabetes mellitus, type 2     GERD (gastroesophageal reflux disease)     Hypertension      Past Surgical History:   Procedure Laterality Date     SECTION      gallbladder removed      TUBAL LIGATION            Physical Exam   Constitutional: She is oriented to person, place, and time.   Eyes: Right conjunctiva is not injected. Left conjunctiva is not injected. Right eye exhibits normal extraocular motion. Left eye exhibits normal extraocular motion.   Neck: No JVD present. No  thyromegaly present.   Cardiovascular: Normal rate. Exam reveals no decreased pulses.   Trace bilateral lower ext edema   Pulmonary/Chest: Effort normal.   Musculoskeletal:         General: Tenderness (dequervains +) present.      Right shoulder: Normal.      Left shoulder: Normal.      Right elbow: Normal.      Left elbow: Normal.      Right wrist: Tenderness present.      Left wrist: Tenderness present.      Right knee: Normal.      Left knee: Normal.   Lymphadenopathy:     She has no cervical adenopathy.   Neurological: She is alert and oriented to person, place, and time. Gait normal.   Skin: No rash noted.   Psychiatric: Mood and affect normal.       Right Side Rheumatological Exam     Examination finds the shoulder, elbow, knee, 1st PIP, 1st MCP, 2nd PIP, 2nd MCP, 3rd PIP, 3rd MCP, 4th PIP, 4th MCP, 5th PIP and 5th MCP normal.    The patient is tender to palpation of the wrist    Left Side Rheumatological Exam     Examination finds the shoulder, elbow, knee, 1st PIP, 1st MCP, 2nd PIP, 2nd MCP, 3rd PIP, 3rd MCP, 4th PIP, 4th MCP, 5th PIP and 5th MCP normal.    The patient is tender to palpation of the wrist.          Recent labs reviewed:  Component      Latest Ref Rng & Units 2/14/2022 1/4/2022   WBC      3.90 - 12.70 K/uL 4.68    RBC      4.00 - 5.40 M/uL 3.65 (L)    Hemoglobin      12.0 - 16.0 g/dL 11.4 (L)    Hematocrit      37.0 - 48.5 % 34.5 (L)    MCV      82 - 98 fL 95    MCH      27.0 - 31.0 pg 31.2 (H)    MCHC      32.0 - 36.0 g/dL 33.0    RDW      11.5 - 14.5 % 12.7    Platelets      150 - 450 K/uL 231    MPV      9.2 - 12.9 fL 11.0    Immature Granulocytes      0.0 - 0.5 % 0.2    Gran # (ANC)      1.8 - 7.7 K/uL 2.8    Immature Grans (Abs)      0.00 - 0.04 K/uL 0.01    Lymph #      1.0 - 4.8 K/uL 1.1    Mono #      0.3 - 1.0 K/uL 0.4    Eos #      0.0 - 0.5 K/uL 0.3    Baso #      0.00 - 0.20 K/uL 0.03    nRBC      0 /100 WBC 0    Gran %      38.0 - 73.0 % 59.7    Lymph %      18.0 - 48.0 % 23.9     Mono %      4.0 - 15.0 % 9.2    Eosinophil %      0.0 - 8.0 % 6.4    Basophil %      0.0 - 1.9 % 0.6    Differential Method       Automated    Sodium      136 - 145 mmol/L 140    Potassium      3.5 - 5.1 mmol/L 4.9    Chloride      95 - 110 mmol/L 105    CO2      23 - 29 mmol/L 24    Glucose      70 - 110 mg/dL 278 (H)    BUN      8 - 23 mg/dL 32 (H)    Creatinine      0.5 - 1.4 mg/dL 1.7 (H)    Calcium      8.7 - 10.5 mg/dL 9.5    PROTEIN TOTAL      6.0 - 8.4 g/dL 7.0    Albumin      3.5 - 5.2 g/dL 3.8    BILIRUBIN TOTAL      0.1 - 1.0 mg/dL 0.2    Alkaline Phosphatase      55 - 135 U/L 54 (L)    AST      10 - 40 U/L 17    ALT      10 - 44 U/L 21    Anion Gap      8 - 16 mmol/L 11    eGFR if African American      >60 mL/min/1.73 m:2 36.5 (A)    eGFR if non African American      >60 mL/min/1.73 m:2 31.6 (A)    Hemoglobin A1C External      4.0 - 5.6 %  8.0 (H)   Estimated Avg Glucose      68 - 131 mg/dL  183 (H)   GRACIELA Screen      Negative <1:80 Positive (A)    Sed Rate      0 - 36 mm/Hr 17    CRP      0.0 - 8.2 mg/L 0.8    TSH      0.400 - 4.000 uIU/mL 2.125    Free T4      0.71 - 1.51 ng/dL 0.83    T3, Free      2.3 - 4.2 pg/mL 2.3    GRACIELA PATTERN 1       Homogeneous    ds DNA Ab      Negative 1:10 Negative 1:10    GRACIELA Titer 1       1:160        Assessment:       1. Seronegative rheumatoid arthritis    2. Cervical arthritis    3. Lumbar back pain    4. Stage 3b chronic kidney disease            Plan:       Seronegative rheumatoid arthritis  -     CBC Auto Differential; Future; Expected date: 02/17/2022  -     Comprehensive Metabolic Panel; Future; Expected date: 02/17/2022  -     C-Reactive Protein; Future; Expected date: 02/17/2022  -     Sedimentation rate; Future; Expected date: 02/17/2022    Cervical arthritis  -     methocarbamoL (ROBAXIN) 750 MG Tab; Take 1 tablet (750 mg total) by mouth 2 (two) times daily as needed.  Dispense: 60 tablet; Refill: 5    Lumbar back pain  -     methocarbamoL (ROBAXIN) 750 MG  Tab; Take 1 tablet (750 mg total) by mouth 2 (two) times daily as needed.  Dispense: 60 tablet; Refill: 5    Stage 3b chronic kidney disease        Assessment:  61 year old female with  Seronegative RA, Sjogren's syndrome, GRACIELA 1:80 homogenous on plaquenil  --osteoarthritis cervical and lumbar spine  --CKD stage 3  --uncontrolled type 2 diabetes with hyperglycemia, last HgbA1c 8%  --pulmonary nodules, restrictive lung disease followed by Pulmonary  --hx of lap band  --normocytic anemia    Plan:  1. Cont Plaquenil 200 mg bid  2. Discontinue mobic due to declining renal function. F/u with PCP next month to discuss d/c diuretic as well.   3. Cont robaxin bid PRN  4. Cont evoxac daily prn for dry mouth  5. Ok to take tylenol 500 mg twice daily if having more pain off mobic  6. Thumb spica splints recommended, if no improvement then consider hand ortho for injections      Follow up:  4 mo w/labs prior

## 2022-05-05 ENCOUNTER — TELEPHONE (OUTPATIENT)
Dept: RHEUMATOLOGY | Facility: CLINIC | Age: 62
End: 2022-05-05
Payer: MEDICAID

## 2022-05-05 NOTE — TELEPHONE ENCOUNTER
Patient states she spoke to someone from the office and reschedule her appointment already  ----- Message from Malinda Dixon sent at 5/5/2022  9:44 AM CDT -----  Contact: pt at [phone]  Type: Needs Medical Advice  Who Called: pt  Best Call Back Number: [phone]  Additional Information: pt is calling the office to return the call made to her a couple days ago about rescheduling her appt on 5/12/22. Please call back and advise.

## 2022-05-09 ENCOUNTER — LAB VISIT (OUTPATIENT)
Dept: LAB | Facility: HOSPITAL | Age: 62
End: 2022-05-09
Attending: NURSE PRACTITIONER
Payer: MEDICAID

## 2022-05-09 DIAGNOSIS — N18.31 TYPE 2 DIABETES MELLITUS WITH STAGE 3A CHRONIC KIDNEY DISEASE, WITH LONG-TERM CURRENT USE OF INSULIN: ICD-10-CM

## 2022-05-09 DIAGNOSIS — Z79.4 TYPE 2 DIABETES MELLITUS WITH STAGE 3A CHRONIC KIDNEY DISEASE, WITH LONG-TERM CURRENT USE OF INSULIN: ICD-10-CM

## 2022-05-09 DIAGNOSIS — M06.00 SERONEGATIVE RHEUMATOID ARTHRITIS: ICD-10-CM

## 2022-05-09 DIAGNOSIS — E11.22 TYPE 2 DIABETES MELLITUS WITH STAGE 3A CHRONIC KIDNEY DISEASE, WITH LONG-TERM CURRENT USE OF INSULIN: ICD-10-CM

## 2022-05-09 LAB
ALBUMIN SERPL BCP-MCNC: 3.9 G/DL (ref 3.5–5.2)
ALP SERPL-CCNC: 52 U/L (ref 55–135)
ALT SERPL W/O P-5'-P-CCNC: 19 U/L (ref 10–44)
ANION GAP SERPL CALC-SCNC: 10 MMOL/L (ref 8–16)
AST SERPL-CCNC: 18 U/L (ref 10–40)
BASOPHILS # BLD AUTO: 0.05 K/UL (ref 0–0.2)
BASOPHILS NFR BLD: 1 % (ref 0–1.9)
BILIRUB SERPL-MCNC: 0.2 MG/DL (ref 0.1–1)
BUN SERPL-MCNC: 34 MG/DL (ref 8–23)
CALCIUM SERPL-MCNC: 10.5 MG/DL (ref 8.7–10.5)
CHLORIDE SERPL-SCNC: 105 MMOL/L (ref 95–110)
CO2 SERPL-SCNC: 24 MMOL/L (ref 23–29)
CREAT SERPL-MCNC: 1.4 MG/DL (ref 0.5–1.4)
CRP SERPL-MCNC: 0.7 MG/L (ref 0–8.2)
DIFFERENTIAL METHOD: ABNORMAL
EOSINOPHIL # BLD AUTO: 0.4 K/UL (ref 0–0.5)
EOSINOPHIL NFR BLD: 7.2 % (ref 0–8)
ERYTHROCYTE [DISTWIDTH] IN BLOOD BY AUTOMATED COUNT: 12.4 % (ref 11.5–14.5)
ERYTHROCYTE [SEDIMENTATION RATE] IN BLOOD BY WESTERGREN METHOD: 18 MM/HR (ref 0–36)
EST. GFR  (AFRICAN AMERICAN): 46.1 ML/MIN/1.73 M^2
EST. GFR  (NON AFRICAN AMERICAN): 40 ML/MIN/1.73 M^2
ESTIMATED AVG GLUCOSE: 169 MG/DL (ref 68–131)
GLUCOSE SERPL-MCNC: 151 MG/DL (ref 70–110)
HBA1C MFR BLD: 7.5 % (ref 4–5.6)
HCT VFR BLD AUTO: 34.7 % (ref 37–48.5)
HGB BLD-MCNC: 11.2 G/DL (ref 12–16)
IMM GRANULOCYTES # BLD AUTO: 0.01 K/UL (ref 0–0.04)
IMM GRANULOCYTES NFR BLD AUTO: 0.2 % (ref 0–0.5)
LYMPHOCYTES # BLD AUTO: 1.7 K/UL (ref 1–4.8)
LYMPHOCYTES NFR BLD: 33.4 % (ref 18–48)
MCH RBC QN AUTO: 29.9 PG (ref 27–31)
MCHC RBC AUTO-ENTMCNC: 32.3 G/DL (ref 32–36)
MCV RBC AUTO: 93 FL (ref 82–98)
MONOCYTES # BLD AUTO: 0.6 K/UL (ref 0.3–1)
MONOCYTES NFR BLD: 12.1 % (ref 4–15)
NEUTROPHILS # BLD AUTO: 2.3 K/UL (ref 1.8–7.7)
NEUTROPHILS NFR BLD: 46.1 % (ref 38–73)
NRBC BLD-RTO: 0 /100 WBC
PLATELET # BLD AUTO: 216 K/UL (ref 150–450)
PMV BLD AUTO: 11.4 FL (ref 9.2–12.9)
POTASSIUM SERPL-SCNC: 4.9 MMOL/L (ref 3.5–5.1)
PROT SERPL-MCNC: 7 G/DL (ref 6–8.4)
RBC # BLD AUTO: 3.74 M/UL (ref 4–5.4)
SODIUM SERPL-SCNC: 139 MMOL/L (ref 136–145)
WBC # BLD AUTO: 5.03 K/UL (ref 3.9–12.7)

## 2022-05-09 PROCEDURE — 86140 C-REACTIVE PROTEIN: CPT | Performed by: PHYSICIAN ASSISTANT

## 2022-05-09 PROCEDURE — 85025 COMPLETE CBC W/AUTO DIFF WBC: CPT | Performed by: PHYSICIAN ASSISTANT

## 2022-05-09 PROCEDURE — 36415 COLL VENOUS BLD VENIPUNCTURE: CPT | Mod: PO | Performed by: PHYSICIAN ASSISTANT

## 2022-05-09 PROCEDURE — 85652 RBC SED RATE AUTOMATED: CPT | Performed by: PHYSICIAN ASSISTANT

## 2022-05-09 PROCEDURE — 83036 HEMOGLOBIN GLYCOSYLATED A1C: CPT | Performed by: NURSE PRACTITIONER

## 2022-05-09 PROCEDURE — 80053 COMPREHEN METABOLIC PANEL: CPT | Performed by: PHYSICIAN ASSISTANT

## 2022-05-10 ENCOUNTER — TELEPHONE (OUTPATIENT)
Dept: RHEUMATOLOGY | Facility: CLINIC | Age: 62
End: 2022-05-10
Payer: MEDICAID

## 2022-05-10 NOTE — TELEPHONE ENCOUNTER
----- Message from Areli Angulo PA-C sent at 5/9/2022  3:38 PM CDT -----  Kidney function is diminished, but stable. Inflammatory markers are normal. Anemia is stable. Please keep your follow up visit with Dr. Wilson as scheduled to review results in further details.

## 2022-05-10 NOTE — TELEPHONE ENCOUNTER
Spoke with patient informed her Dr Wilson will review labs in detail at next visit which is scheduled for tomorrow. Patient confirmed she is keeping scheduled appt.

## 2022-05-11 ENCOUNTER — DOCUMENTATION ONLY (OUTPATIENT)
Dept: PHARMACY | Facility: CLINIC | Age: 62
End: 2022-05-11
Payer: MEDICAID

## 2022-05-11 ENCOUNTER — OFFICE VISIT (OUTPATIENT)
Dept: RHEUMATOLOGY | Facility: CLINIC | Age: 62
End: 2022-05-11
Payer: MEDICAID

## 2022-05-11 VITALS
HEART RATE: 84 BPM | BODY MASS INDEX: 40.49 KG/M2 | DIASTOLIC BLOOD PRESSURE: 62 MMHG | WEIGHT: 237.19 LBS | HEIGHT: 64 IN | SYSTOLIC BLOOD PRESSURE: 93 MMHG

## 2022-05-11 DIAGNOSIS — M06.00 SERONEGATIVE RHEUMATOID ARTHRITIS: Primary | ICD-10-CM

## 2022-05-11 DIAGNOSIS — G89.4 CHRONIC PAIN SYNDROME: ICD-10-CM

## 2022-05-11 DIAGNOSIS — M47.812 CERVICAL ARTHRITIS: ICD-10-CM

## 2022-05-11 DIAGNOSIS — N18.32 STAGE 3B CHRONIC KIDNEY DISEASE: ICD-10-CM

## 2022-05-11 DIAGNOSIS — M54.50 LUMBAR BACK PAIN: ICD-10-CM

## 2022-05-11 PROCEDURE — 3051F PR MOST RECENT HEMOGLOBIN A1C LEVEL 7.0 - < 8.0%: ICD-10-PCS | Mod: CPTII,,, | Performed by: INTERNAL MEDICINE

## 2022-05-11 PROCEDURE — 3074F SYST BP LT 130 MM HG: CPT | Mod: CPTII,,, | Performed by: INTERNAL MEDICINE

## 2022-05-11 PROCEDURE — 3074F PR MOST RECENT SYSTOLIC BLOOD PRESSURE < 130 MM HG: ICD-10-PCS | Mod: CPTII,,, | Performed by: INTERNAL MEDICINE

## 2022-05-11 PROCEDURE — 99999 PR PBB SHADOW E&M-EST. PATIENT-LVL IV: CPT | Mod: PBBFAC,,, | Performed by: INTERNAL MEDICINE

## 2022-05-11 PROCEDURE — 3061F NEG MICROALBUMINURIA REV: CPT | Mod: CPTII,,, | Performed by: INTERNAL MEDICINE

## 2022-05-11 PROCEDURE — 99215 PR OFFICE/OUTPT VISIT, EST, LEVL V, 40-54 MIN: ICD-10-PCS | Mod: S$PBB,,, | Performed by: INTERNAL MEDICINE

## 2022-05-11 PROCEDURE — 1159F MED LIST DOCD IN RCRD: CPT | Mod: CPTII,,, | Performed by: INTERNAL MEDICINE

## 2022-05-11 PROCEDURE — 4010F ACE/ARB THERAPY RXD/TAKEN: CPT | Mod: CPTII,,, | Performed by: INTERNAL MEDICINE

## 2022-05-11 PROCEDURE — 3008F PR BODY MASS INDEX (BMI) DOCUMENTED: ICD-10-PCS | Mod: CPTII,,, | Performed by: INTERNAL MEDICINE

## 2022-05-11 PROCEDURE — 3078F PR MOST RECENT DIASTOLIC BLOOD PRESSURE < 80 MM HG: ICD-10-PCS | Mod: CPTII,,, | Performed by: INTERNAL MEDICINE

## 2022-05-11 PROCEDURE — 99215 OFFICE O/P EST HI 40 MIN: CPT | Mod: S$PBB,,, | Performed by: INTERNAL MEDICINE

## 2022-05-11 PROCEDURE — 3061F PR NEG MICROALBUMINURIA RESULT DOCUMENTED/REVIEW: ICD-10-PCS | Mod: CPTII,,, | Performed by: INTERNAL MEDICINE

## 2022-05-11 PROCEDURE — 3051F HG A1C>EQUAL 7.0%<8.0%: CPT | Mod: CPTII,,, | Performed by: INTERNAL MEDICINE

## 2022-05-11 PROCEDURE — 3066F PR DOCUMENTATION OF TREATMENT FOR NEPHROPATHY: ICD-10-PCS | Mod: CPTII,,, | Performed by: INTERNAL MEDICINE

## 2022-05-11 PROCEDURE — 3066F NEPHROPATHY DOC TX: CPT | Mod: CPTII,,, | Performed by: INTERNAL MEDICINE

## 2022-05-11 PROCEDURE — 4010F PR ACE/ARB THEARPY RXD/TAKEN: ICD-10-PCS | Mod: CPTII,,, | Performed by: INTERNAL MEDICINE

## 2022-05-11 PROCEDURE — 3078F DIAST BP <80 MM HG: CPT | Mod: CPTII,,, | Performed by: INTERNAL MEDICINE

## 2022-05-11 PROCEDURE — 3008F BODY MASS INDEX DOCD: CPT | Mod: CPTII,,, | Performed by: INTERNAL MEDICINE

## 2022-05-11 PROCEDURE — 99999 PR PBB SHADOW E&M-EST. PATIENT-LVL IV: ICD-10-PCS | Mod: PBBFAC,,, | Performed by: INTERNAL MEDICINE

## 2022-05-11 PROCEDURE — 1159F PR MEDICATION LIST DOCUMENTED IN MEDICAL RECORD: ICD-10-PCS | Mod: CPTII,,, | Performed by: INTERNAL MEDICINE

## 2022-05-11 PROCEDURE — 99214 OFFICE O/P EST MOD 30 MIN: CPT | Mod: PBBFAC,PN | Performed by: INTERNAL MEDICINE

## 2022-05-11 RX ORDER — METHOCARBAMOL 750 MG/1
750 TABLET, FILM COATED ORAL 2 TIMES DAILY PRN
COMMUNITY
Start: 2022-04-23 | End: 2022-09-23 | Stop reason: SDUPTHER

## 2022-05-11 RX ORDER — PREDNISONE 2.5 MG/1
5 TABLET ORAL DAILY PRN
Qty: 60 TABLET | Refills: 1 | Status: SHIPPED | OUTPATIENT
Start: 2022-05-11 | End: 2023-05-23 | Stop reason: SDUPTHER

## 2022-05-11 RX ORDER — TRAMADOL HYDROCHLORIDE AND ACETAMINOPHEN 37.5; 325 MG/1; MG/1
1 TABLET, FILM COATED ORAL EVERY 6 HOURS PRN
Qty: 120 TABLET | Refills: 3 | Status: SHIPPED | OUTPATIENT
Start: 2022-05-11 | End: 2022-06-10

## 2022-05-11 ASSESSMENT — ROUTINE ASSESSMENT OF PATIENT INDEX DATA (RAPID3)
PAIN SCORE: 6
TOTAL RAPID3 SCORE: 4.22
PSYCHOLOGICAL DISTRESS SCORE: 0
MDHAQ FUNCTION SCORE: 1.1
FATIGUE SCORE: 1.1
PATIENT GLOBAL ASSESSMENT SCORE: 3

## 2022-05-11 NOTE — PROGRESS NOTES
PA submitted for TRAMADOL-ACETAMINOPHEN 37.5-325 MG    Key:  [id number]    TALIA [facility]  MED ACCESS  5/11/2022

## 2022-05-11 NOTE — PATIENT INSTRUCTIONS
1. Dc Chlorthalidone unsure why she was started but has kidney issues and blood pressure low  2. Continue  lisinopril  3. Added ultracet prn and rarely use  Prednisone   4.robaxin as needed continue plaquenil.

## 2022-05-11 NOTE — PROGRESS NOTES
Subjective:       Patient ID: Patricia Fournier is a 61 y.o. female.    Chief Complaint: Disease Management      Mrs. Fournier is a 61 year old female who presents to clinic for follow up on seronegative RA, osteoarthritis, sjogren's syndrome.off mobic to help kidney function she has pain, neck lower back. She continues to have stiffness in her hands, wrists, and ankles that is worse with the changing weather. AM stiffness typically lasts less than 30 minutes. She is taking plaquenil 200 mg twice daily, m robaxin nightly. She has bilateral wrist pain that is worse with picking up her grandchild. We reviewed her recent labs which showed further decline in her renal function.     Current tx:  1.  mg bid   2. voltaren gel            She complains of joint swelling. Associated symptoms include fatigue. Pertinent negatives include no fever or headaches.         Review of Systems   Constitutional: Positive for activity change and fatigue. Negative for appetite change, chills and fever.   Eyes: Negative for visual disturbance.   Respiratory: Negative for cough.    Cardiovascular: Negative for chest pain.   Gastrointestinal: Negative for abdominal pain, constipation, diarrhea, nausea and vomiting.   Musculoskeletal: Positive for arthralgias, back pain, joint swelling and neck pain.   Neurological: Negative for dizziness, weakness, light-headedness and headaches.         Objective:     Vitals:    22 1106   BP: 93/62   Pulse: 84       Past Medical History:   Diagnosis Date    Diabetes mellitus, type 2     GERD (gastroesophageal reflux disease)     Hypertension      Past Surgical History:   Procedure Laterality Date     SECTION      gallbladder removed      TUBAL LIGATION            Physical Exam   Constitutional: She is oriented to person, place, and time.   Eyes: Right conjunctiva is not injected. Left conjunctiva is not injected. Right eye exhibits normal extraocular motion. Left eye exhibits normal  extraocular motion.   Neck: No JVD present. No thyromegaly present.   Cardiovascular: Normal rate. Exam reveals no decreased pulses.   Trace bilateral lower ext edema   Pulmonary/Chest: Effort normal.   Musculoskeletal:         General: Tenderness (dequervains +) present.      Right shoulder: Normal.      Left shoulder: Normal.      Right elbow: Normal.      Left elbow: Normal.      Right wrist: Tenderness present.      Left wrist: Tenderness present.      Right knee: Normal.      Left knee: Normal.   Lymphadenopathy:     She has no cervical adenopathy.   Neurological: She is alert and oriented to person, place, and time. Gait normal.   Skin: No rash noted.   Psychiatric: Mood and affect normal.       Right Side Rheumatological Exam     Examination finds the shoulder, elbow, knee, 1st PIP, 1st MCP, 2nd PIP, 2nd MCP, 3rd PIP, 3rd MCP, 4th PIP, 4th MCP, 5th PIP and 5th MCP normal.    The patient is tender to palpation of the wrist    Left Side Rheumatological Exam     Examination finds the shoulder, elbow, knee, 1st PIP, 1st MCP, 2nd PIP, 2nd MCP, 3rd PIP, 3rd MCP, 4th PIP, 4th MCP, 5th PIP and 5th MCP normal.    The patient is tender to palpation of the wrist.            Results for orders placed or performed in visit on 05/09/22   Hemoglobin A1C   Result Value Ref Range    Hemoglobin A1C 7.5 (H) 4.0 - 5.6 %    Estimated Avg Glucose 169 (H) 68 - 131 mg/dL   CBC Auto Differential   Result Value Ref Range    WBC 5.03 3.90 - 12.70 K/uL    RBC 3.74 (L) 4.00 - 5.40 M/uL    Hemoglobin 11.2 (L) 12.0 - 16.0 g/dL    Hematocrit 34.7 (L) 37.0 - 48.5 %    MCV 93 82 - 98 fL    MCH 29.9 27.0 - 31.0 pg    MCHC 32.3 32.0 - 36.0 g/dL    RDW 12.4 11.5 - 14.5 %    Platelets 216 150 - 450 K/uL    MPV 11.4 9.2 - 12.9 fL    Immature Granulocytes 0.2 0.0 - 0.5 %    Gran # (ANC) 2.3 1.8 - 7.7 K/uL    Immature Grans (Abs) 0.01 0.00 - 0.04 K/uL    Lymph # 1.7 1.0 - 4.8 K/uL    Mono # 0.6 0.3 - 1.0 K/uL    Eos # 0.4 0.0 - 0.5 K/uL    Baso #  0.05 0.00 - 0.20 K/uL    nRBC 0 0 /100 WBC    Gran % 46.1 38.0 - 73.0 %    Lymph % 33.4 18.0 - 48.0 %    Mono % 12.1 4.0 - 15.0 %    Eosinophil % 7.2 0.0 - 8.0 %    Basophil % 1.0 0.0 - 1.9 %    Differential Method Automated    Comprehensive Metabolic Panel   Result Value Ref Range    Sodium 139 136 - 145 mmol/L    Potassium 4.9 3.5 - 5.1 mmol/L    Chloride 105 95 - 110 mmol/L    CO2 24 23 - 29 mmol/L    Glucose 151 (H) 70 - 110 mg/dL    BUN 34 (H) 8 - 23 mg/dL    Creatinine 1.4 0.5 - 1.4 mg/dL    Calcium 10.5 8.7 - 10.5 mg/dL    Total Protein 7.0 6.0 - 8.4 g/dL    Albumin 3.9 3.5 - 5.2 g/dL    Total Bilirubin 0.2 0.1 - 1.0 mg/dL    Alkaline Phosphatase 52 (L) 55 - 135 U/L    AST 18 10 - 40 U/L    ALT 19 10 - 44 U/L    Anion Gap 10 8 - 16 mmol/L    eGFR if African American 46.1 (A) >60 mL/min/1.73 m^2    eGFR if non African American 40.0 (A) >60 mL/min/1.73 m^2   C-Reactive Protein   Result Value Ref Range    CRP 0.7 0.0 - 8.2 mg/L   Sedimentation rate   Result Value Ref Range    Sed Rate 18 0 - 36 mm/Hr         Assessment:       1. Seronegative rheumatoid arthritis    2. Chronic pain syndrome    3. Cervical arthritis    4. Lumbar back pain    5. Stage 3b chronic kidney disease            Plan:       Seronegative rheumatoid arthritis  -     tramadol-acetaminophen 37.5-325 mg (ULTRACET) 37.5-325 mg Tab; Take 1 tablet by mouth every 6 (six) hours as needed for Pain.  Dispense: 120 tablet; Refill: 3  -     predniSONE (DELTASONE) 2.5 MG tablet; Take 2 tablets (5 mg total) by mouth daily as needed (severe flare in the am).  Dispense: 60 tablet; Refill: 1  -     CBC Auto Differential; Future; Expected date: 05/11/2022  -     Comprehensive Metabolic Panel; Future; Expected date: 05/11/2022  -     Sedimentation rate; Future; Expected date: 05/11/2022  -     C-Reactive Protein; Future; Expected date: 05/11/2022  -     Rheumatoid Factor; Future; Expected date: 05/11/2022  -     Cyclic Citrullinated Peptide Antibody, IgG;  Future; Expected date: 05/11/2022  -     GRACIELA Screen w/Reflex; Future; Expected date: 05/11/2022  -     TSH; Future; Expected date: 05/11/2022  -     T4, Free; Future; Expected date: 05/11/2022  -     Vitamin B12; Future; Expected date: 05/11/2022    Chronic pain syndrome  -     tramadol-acetaminophen 37.5-325 mg (ULTRACET) 37.5-325 mg Tab; Take 1 tablet by mouth every 6 (six) hours as needed for Pain.  Dispense: 120 tablet; Refill: 3  -     predniSONE (DELTASONE) 2.5 MG tablet; Take 2 tablets (5 mg total) by mouth daily as needed (severe flare in the am).  Dispense: 60 tablet; Refill: 1  -     CBC Auto Differential; Future; Expected date: 05/11/2022  -     Comprehensive Metabolic Panel; Future; Expected date: 05/11/2022  -     Sedimentation rate; Future; Expected date: 05/11/2022  -     C-Reactive Protein; Future; Expected date: 05/11/2022  -     Rheumatoid Factor; Future; Expected date: 05/11/2022  -     Cyclic Citrullinated Peptide Antibody, IgG; Future; Expected date: 05/11/2022  -     GRACIELA Screen w/Reflex; Future; Expected date: 05/11/2022  -     TSH; Future; Expected date: 05/11/2022  -     T4, Free; Future; Expected date: 05/11/2022  -     Vitamin B12; Future; Expected date: 05/11/2022    Cervical arthritis  -     tramadol-acetaminophen 37.5-325 mg (ULTRACET) 37.5-325 mg Tab; Take 1 tablet by mouth every 6 (six) hours as needed for Pain.  Dispense: 120 tablet; Refill: 3  -     predniSONE (DELTASONE) 2.5 MG tablet; Take 2 tablets (5 mg total) by mouth daily as needed (severe flare in the am).  Dispense: 60 tablet; Refill: 1  -     CBC Auto Differential; Future; Expected date: 05/11/2022  -     Comprehensive Metabolic Panel; Future; Expected date: 05/11/2022  -     Sedimentation rate; Future; Expected date: 05/11/2022  -     C-Reactive Protein; Future; Expected date: 05/11/2022  -     Rheumatoid Factor; Future; Expected date: 05/11/2022  -     Cyclic Citrullinated Peptide Antibody, IgG; Future; Expected date:  05/11/2022  -     GRACIELA Screen w/Reflex; Future; Expected date: 05/11/2022  -     TSH; Future; Expected date: 05/11/2022  -     T4, Free; Future; Expected date: 05/11/2022  -     Vitamin B12; Future; Expected date: 05/11/2022    Lumbar back pain  -     tramadol-acetaminophen 37.5-325 mg (ULTRACET) 37.5-325 mg Tab; Take 1 tablet by mouth every 6 (six) hours as needed for Pain.  Dispense: 120 tablet; Refill: 3  -     predniSONE (DELTASONE) 2.5 MG tablet; Take 2 tablets (5 mg total) by mouth daily as needed (severe flare in the am).  Dispense: 60 tablet; Refill: 1  -     CBC Auto Differential; Future; Expected date: 05/11/2022  -     Comprehensive Metabolic Panel; Future; Expected date: 05/11/2022  -     Sedimentation rate; Future; Expected date: 05/11/2022  -     C-Reactive Protein; Future; Expected date: 05/11/2022  -     Rheumatoid Factor; Future; Expected date: 05/11/2022  -     Cyclic Citrullinated Peptide Antibody, IgG; Future; Expected date: 05/11/2022  -     GRACIELA Screen w/Reflex; Future; Expected date: 05/11/2022  -     TSH; Future; Expected date: 05/11/2022  -     T4, Free; Future; Expected date: 05/11/2022  -     Vitamin B12; Future; Expected date: 05/11/2022    Stage 3b chronic kidney disease  -     tramadol-acetaminophen 37.5-325 mg (ULTRACET) 37.5-325 mg Tab; Take 1 tablet by mouth every 6 (six) hours as needed for Pain.  Dispense: 120 tablet; Refill: 3  -     predniSONE (DELTASONE) 2.5 MG tablet; Take 2 tablets (5 mg total) by mouth daily as needed (severe flare in the am).  Dispense: 60 tablet; Refill: 1  -     CBC Auto Differential; Future; Expected date: 05/11/2022  -     Comprehensive Metabolic Panel; Future; Expected date: 05/11/2022  -     Sedimentation rate; Future; Expected date: 05/11/2022  -     C-Reactive Protein; Future; Expected date: 05/11/2022  -     Rheumatoid Factor; Future; Expected date: 05/11/2022  -     Cyclic Citrullinated Peptide Antibody, IgG; Future; Expected date: 05/11/2022  -      GRACIELA Screen w/Reflex; Future; Expected date: 05/11/2022  -     TSH; Future; Expected date: 05/11/2022  -     T4, Free; Future; Expected date: 05/11/2022  -     Vitamin B12; Future; Expected date: 05/11/2022        Assessment:  61 year old female with  Seronegative RA, Sjogren's syndrome, GRACIELA 1:80 homogenous on plaquenil  --osteoarthritis cervical and lumbar spine  --CKD stage 3  --uncontrolled type 2 diabetes with hyperglycemia, last HgbA1c 8%  --pulmonary nodules, restrictive lung disease followed by Pulmonary  --hx of lap band  --normocytic anemia    1. Dc Chlorthalidone unsure why she was started but has kidney issues and blood pressure low  2. Continue  lisinopril  3. Added ultracet prn and rarely use  Prednisone   4.robaxin as needed continue plaquenil.

## 2022-08-03 ENCOUNTER — LAB VISIT (OUTPATIENT)
Dept: LAB | Facility: HOSPITAL | Age: 62
End: 2022-08-03
Attending: INTERNAL MEDICINE
Payer: MEDICAID

## 2022-08-03 DIAGNOSIS — N18.32 STAGE 3B CHRONIC KIDNEY DISEASE: ICD-10-CM

## 2022-08-03 DIAGNOSIS — Z79.4 TYPE 2 DIABETES MELLITUS WITH STAGE 3A CHRONIC KIDNEY DISEASE, WITH LONG-TERM CURRENT USE OF INSULIN: ICD-10-CM

## 2022-08-03 DIAGNOSIS — E11.22 TYPE 2 DIABETES MELLITUS WITH STAGE 3A CHRONIC KIDNEY DISEASE, WITH LONG-TERM CURRENT USE OF INSULIN: ICD-10-CM

## 2022-08-03 DIAGNOSIS — G89.4 CHRONIC PAIN SYNDROME: ICD-10-CM

## 2022-08-03 DIAGNOSIS — M54.50 LUMBAR BACK PAIN: ICD-10-CM

## 2022-08-03 DIAGNOSIS — N18.31 TYPE 2 DIABETES MELLITUS WITH STAGE 3A CHRONIC KIDNEY DISEASE, WITH LONG-TERM CURRENT USE OF INSULIN: ICD-10-CM

## 2022-08-03 DIAGNOSIS — M06.00 SERONEGATIVE RHEUMATOID ARTHRITIS: ICD-10-CM

## 2022-08-03 DIAGNOSIS — M47.812 CERVICAL ARTHRITIS: ICD-10-CM

## 2022-08-03 LAB
ALBUMIN SERPL BCP-MCNC: 3.7 G/DL (ref 3.5–5.2)
ALP SERPL-CCNC: 55 U/L (ref 55–135)
ALT SERPL W/O P-5'-P-CCNC: 18 U/L (ref 10–44)
ANION GAP SERPL CALC-SCNC: 8 MMOL/L (ref 8–16)
AST SERPL-CCNC: 16 U/L (ref 10–40)
BASOPHILS # BLD AUTO: 0.04 K/UL (ref 0–0.2)
BASOPHILS NFR BLD: 0.7 % (ref 0–1.9)
BILIRUB SERPL-MCNC: 0.2 MG/DL (ref 0.1–1)
BUN SERPL-MCNC: 24 MG/DL (ref 8–23)
CALCIUM SERPL-MCNC: 9.6 MG/DL (ref 8.7–10.5)
CCP AB SER IA-ACNC: <0.5 U/ML
CHLORIDE SERPL-SCNC: 105 MMOL/L (ref 95–110)
CO2 SERPL-SCNC: 27 MMOL/L (ref 23–29)
CREAT SERPL-MCNC: 1.1 MG/DL (ref 0.5–1.4)
CRP SERPL-MCNC: 2 MG/L (ref 0–8.2)
DIFFERENTIAL METHOD: ABNORMAL
EOSINOPHIL # BLD AUTO: 0.3 K/UL (ref 0–0.5)
EOSINOPHIL NFR BLD: 4.5 % (ref 0–8)
ERYTHROCYTE [DISTWIDTH] IN BLOOD BY AUTOMATED COUNT: 12.6 % (ref 11.5–14.5)
ERYTHROCYTE [SEDIMENTATION RATE] IN BLOOD BY PHOTOMETRIC METHOD: 9 MM/HR (ref 0–36)
EST. GFR  (NO RACE VARIABLE): 56.1 ML/MIN/1.73 M^2
ESTIMATED AVG GLUCOSE: 163 MG/DL (ref 68–131)
GLUCOSE SERPL-MCNC: 130 MG/DL (ref 70–110)
HBA1C MFR BLD: 7.3 % (ref 4–5.6)
HCT VFR BLD AUTO: 33 % (ref 37–48.5)
HGB BLD-MCNC: 10.6 G/DL (ref 12–16)
IMM GRANULOCYTES # BLD AUTO: 0.01 K/UL (ref 0–0.04)
IMM GRANULOCYTES NFR BLD AUTO: 0.2 % (ref 0–0.5)
LYMPHOCYTES # BLD AUTO: 1.6 K/UL (ref 1–4.8)
LYMPHOCYTES NFR BLD: 28.3 % (ref 18–48)
MCH RBC QN AUTO: 30.1 PG (ref 27–31)
MCHC RBC AUTO-ENTMCNC: 32.1 G/DL (ref 32–36)
MCV RBC AUTO: 94 FL (ref 82–98)
MONOCYTES # BLD AUTO: 0.7 K/UL (ref 0.3–1)
MONOCYTES NFR BLD: 11.9 % (ref 4–15)
NEUTROPHILS # BLD AUTO: 3 K/UL (ref 1.8–7.7)
NEUTROPHILS NFR BLD: 54.4 % (ref 38–73)
NRBC BLD-RTO: 0 /100 WBC
PLATELET # BLD AUTO: 255 K/UL (ref 150–450)
PMV BLD AUTO: 10.7 FL (ref 9.2–12.9)
POTASSIUM SERPL-SCNC: 5 MMOL/L (ref 3.5–5.1)
PROT SERPL-MCNC: 6.8 G/DL (ref 6–8.4)
RBC # BLD AUTO: 3.52 M/UL (ref 4–5.4)
RHEUMATOID FACT SERPL-ACNC: <13 IU/ML (ref 0–15)
SODIUM SERPL-SCNC: 140 MMOL/L (ref 136–145)
T4 FREE SERPL-MCNC: 0.97 NG/DL (ref 0.71–1.51)
TSH SERPL DL<=0.005 MIU/L-ACNC: 2.63 UIU/ML (ref 0.4–4)
VIT B12 SERPL-MCNC: 295 PG/ML (ref 210–950)
WBC # BLD AUTO: 5.55 K/UL (ref 3.9–12.7)

## 2022-08-03 PROCEDURE — 86038 ANTINUCLEAR ANTIBODIES: CPT | Performed by: INTERNAL MEDICINE

## 2022-08-03 PROCEDURE — 83036 HEMOGLOBIN GLYCOSYLATED A1C: CPT | Performed by: NURSE PRACTITIONER

## 2022-08-03 PROCEDURE — 85652 RBC SED RATE AUTOMATED: CPT | Performed by: INTERNAL MEDICINE

## 2022-08-03 PROCEDURE — 84439 ASSAY OF FREE THYROXINE: CPT | Performed by: INTERNAL MEDICINE

## 2022-08-03 PROCEDURE — 80053 COMPREHEN METABOLIC PANEL: CPT | Performed by: INTERNAL MEDICINE

## 2022-08-03 PROCEDURE — 86039 ANTINUCLEAR ANTIBODIES (ANA): CPT | Performed by: NURSE PRACTITIONER

## 2022-08-03 PROCEDURE — 86200 CCP ANTIBODY: CPT | Performed by: INTERNAL MEDICINE

## 2022-08-03 PROCEDURE — 36415 COLL VENOUS BLD VENIPUNCTURE: CPT | Mod: PO | Performed by: INTERNAL MEDICINE

## 2022-08-03 PROCEDURE — 86431 RHEUMATOID FACTOR QUANT: CPT | Performed by: INTERNAL MEDICINE

## 2022-08-03 PROCEDURE — 86235 NUCLEAR ANTIGEN ANTIBODY: CPT | Mod: 59 | Performed by: INTERNAL MEDICINE

## 2022-08-03 PROCEDURE — 85025 COMPLETE CBC W/AUTO DIFF WBC: CPT | Performed by: INTERNAL MEDICINE

## 2022-08-03 PROCEDURE — 84443 ASSAY THYROID STIM HORMONE: CPT | Performed by: INTERNAL MEDICINE

## 2022-08-03 PROCEDURE — 86140 C-REACTIVE PROTEIN: CPT | Performed by: INTERNAL MEDICINE

## 2022-08-03 PROCEDURE — 82607 VITAMIN B-12: CPT | Performed by: INTERNAL MEDICINE

## 2022-08-04 LAB
ANA PATTERN 1: NORMAL
ANA SER QL IF: POSITIVE
ANA TITR SER IF: NORMAL {TITER}

## 2022-08-05 LAB
ANTI SM ANTIBODY: 0.1 RATIO (ref 0–0.99)
ANTI SM/RNP ANTIBODY: 0.09 RATIO (ref 0–0.99)
ANTI-SM INTERPRETATION: NEGATIVE
ANTI-SM/RNP INTERPRETATION: NEGATIVE
ANTI-SSA ANTIBODY: 0.06 RATIO (ref 0–0.99)
ANTI-SSA INTERPRETATION: NEGATIVE
ANTI-SSB ANTIBODY: 0.07 RATIO (ref 0–0.99)
ANTI-SSB INTERPRETATION: NEGATIVE
DSDNA AB SER-ACNC: NORMAL [IU]/ML

## 2022-08-09 ENCOUNTER — TELEPHONE (OUTPATIENT)
Dept: RHEUMATOLOGY | Facility: CLINIC | Age: 62
End: 2022-08-09
Payer: MEDICAID

## 2022-08-09 NOTE — TELEPHONE ENCOUNTER
Called patient and rescheduled patients appointment  ----- Message from Catarino Gomez, Patient Care Assistant sent at 8/9/2022  2:45 PM CDT -----  Contact: Pt  Type: Needs Medical Advice    Who Called: Pt  Best Call Back Number: [phone]  Inquiry/Question: Pt is calling to see if she can be seen on 08/16/2022 between 11:30-1:00? Pt states she can not make the later appt. Please call back and advise. Thank you~

## 2022-08-10 ENCOUNTER — OFFICE VISIT (OUTPATIENT)
Dept: ENDOCRINOLOGY | Facility: CLINIC | Age: 62
End: 2022-08-10
Payer: MEDICAID

## 2022-08-10 VITALS
RESPIRATION RATE: 18 BRPM | DIASTOLIC BLOOD PRESSURE: 62 MMHG | WEIGHT: 242.5 LBS | HEART RATE: 92 BPM | BODY MASS INDEX: 41.4 KG/M2 | HEIGHT: 64 IN | SYSTOLIC BLOOD PRESSURE: 122 MMHG

## 2022-08-10 DIAGNOSIS — E78.2 MIXED HYPERLIPIDEMIA: ICD-10-CM

## 2022-08-10 DIAGNOSIS — N18.31 TYPE 2 DIABETES MELLITUS WITH STAGE 3A CHRONIC KIDNEY DISEASE, WITH LONG-TERM CURRENT USE OF INSULIN: Primary | ICD-10-CM

## 2022-08-10 DIAGNOSIS — E66.01 CLASS 3 SEVERE OBESITY DUE TO EXCESS CALORIES WITH SERIOUS COMORBIDITY AND BODY MASS INDEX (BMI) OF 40.0 TO 44.9 IN ADULT: ICD-10-CM

## 2022-08-10 DIAGNOSIS — I10 ESSENTIAL HYPERTENSION: ICD-10-CM

## 2022-08-10 DIAGNOSIS — Z79.4 TYPE 2 DIABETES MELLITUS WITH STAGE 3A CHRONIC KIDNEY DISEASE, WITH LONG-TERM CURRENT USE OF INSULIN: Primary | ICD-10-CM

## 2022-08-10 DIAGNOSIS — E11.22 TYPE 2 DIABETES MELLITUS WITH STAGE 3A CHRONIC KIDNEY DISEASE, WITH LONG-TERM CURRENT USE OF INSULIN: Primary | ICD-10-CM

## 2022-08-10 DIAGNOSIS — N18.32 STAGE 3B CHRONIC KIDNEY DISEASE: ICD-10-CM

## 2022-08-10 PROCEDURE — 3078F PR MOST RECENT DIASTOLIC BLOOD PRESSURE < 80 MM HG: ICD-10-PCS | Mod: CPTII,,, | Performed by: NURSE PRACTITIONER

## 2022-08-10 PROCEDURE — 3008F PR BODY MASS INDEX (BMI) DOCUMENTED: ICD-10-PCS | Mod: CPTII,,, | Performed by: NURSE PRACTITIONER

## 2022-08-10 PROCEDURE — 1159F MED LIST DOCD IN RCRD: CPT | Mod: CPTII,,, | Performed by: NURSE PRACTITIONER

## 2022-08-10 PROCEDURE — 99214 OFFICE O/P EST MOD 30 MIN: CPT | Mod: PBBFAC,PO | Performed by: NURSE PRACTITIONER

## 2022-08-10 PROCEDURE — 3051F HG A1C>EQUAL 7.0%<8.0%: CPT | Mod: CPTII,,, | Performed by: NURSE PRACTITIONER

## 2022-08-10 PROCEDURE — 4010F PR ACE/ARB THEARPY RXD/TAKEN: ICD-10-PCS | Mod: CPTII,,, | Performed by: NURSE PRACTITIONER

## 2022-08-10 PROCEDURE — 3066F PR DOCUMENTATION OF TREATMENT FOR NEPHROPATHY: ICD-10-PCS | Mod: CPTII,,, | Performed by: NURSE PRACTITIONER

## 2022-08-10 PROCEDURE — 99999 PR PBB SHADOW E&M-EST. PATIENT-LVL IV: ICD-10-PCS | Mod: PBBFAC,,, | Performed by: NURSE PRACTITIONER

## 2022-08-10 PROCEDURE — 3051F PR MOST RECENT HEMOGLOBIN A1C LEVEL 7.0 - < 8.0%: ICD-10-PCS | Mod: CPTII,,, | Performed by: NURSE PRACTITIONER

## 2022-08-10 PROCEDURE — 3061F PR NEG MICROALBUMINURIA RESULT DOCUMENTED/REVIEW: ICD-10-PCS | Mod: CPTII,,, | Performed by: NURSE PRACTITIONER

## 2022-08-10 PROCEDURE — 3074F SYST BP LT 130 MM HG: CPT | Mod: CPTII,,, | Performed by: NURSE PRACTITIONER

## 2022-08-10 PROCEDURE — 99214 OFFICE O/P EST MOD 30 MIN: CPT | Mod: S$PBB,,, | Performed by: NURSE PRACTITIONER

## 2022-08-10 PROCEDURE — 4010F ACE/ARB THERAPY RXD/TAKEN: CPT | Mod: CPTII,,, | Performed by: NURSE PRACTITIONER

## 2022-08-10 PROCEDURE — 1159F PR MEDICATION LIST DOCUMENTED IN MEDICAL RECORD: ICD-10-PCS | Mod: CPTII,,, | Performed by: NURSE PRACTITIONER

## 2022-08-10 PROCEDURE — 99999 PR PBB SHADOW E&M-EST. PATIENT-LVL IV: CPT | Mod: PBBFAC,,, | Performed by: NURSE PRACTITIONER

## 2022-08-10 PROCEDURE — 3061F NEG MICROALBUMINURIA REV: CPT | Mod: CPTII,,, | Performed by: NURSE PRACTITIONER

## 2022-08-10 PROCEDURE — 3066F NEPHROPATHY DOC TX: CPT | Mod: CPTII,,, | Performed by: NURSE PRACTITIONER

## 2022-08-10 PROCEDURE — 3074F PR MOST RECENT SYSTOLIC BLOOD PRESSURE < 130 MM HG: ICD-10-PCS | Mod: CPTII,,, | Performed by: NURSE PRACTITIONER

## 2022-08-10 PROCEDURE — 99214 PR OFFICE/OUTPT VISIT, EST, LEVL IV, 30-39 MIN: ICD-10-PCS | Mod: S$PBB,,, | Performed by: NURSE PRACTITIONER

## 2022-08-10 PROCEDURE — 3008F BODY MASS INDEX DOCD: CPT | Mod: CPTII,,, | Performed by: NURSE PRACTITIONER

## 2022-08-10 PROCEDURE — 3078F DIAST BP <80 MM HG: CPT | Mod: CPTII,,, | Performed by: NURSE PRACTITIONER

## 2022-08-10 RX ORDER — PEN NEEDLE, DIABETIC 31 GX5/16"
NEEDLE, DISPOSABLE MISCELLANEOUS
Qty: 200 EACH | Status: SHIPPED | OUTPATIENT
Start: 2022-08-10

## 2022-08-10 RX ORDER — INSULIN GLARGINE 100 [IU]/ML
INJECTION, SOLUTION SUBCUTANEOUS
Qty: 18 ML | Refills: 12 | Status: SHIPPED | OUTPATIENT
Start: 2022-08-10 | End: 2023-08-14 | Stop reason: SDUPTHER

## 2022-08-10 RX ORDER — DULAGLUTIDE 1.5 MG/.5ML
1.5 INJECTION, SOLUTION SUBCUTANEOUS
Qty: 4 PEN | Refills: 3 | Status: SHIPPED | OUTPATIENT
Start: 2022-08-10 | End: 2023-06-05 | Stop reason: SDUPTHER

## 2022-08-10 NOTE — PROGRESS NOTES
Patient ID: Patricia Fournier is a 62 y.o. female.    Chief Complaint: No chief complaint on file.    HPI:  Pt is a 62 y.o. wf  with a diagnosis of Type 2 diabetes mellitus diagnosed approximately 2000 , as well as chronic conditions pending review including HTN, HLP.  Other pertinent medical and social information noted includes, but not limited to: morbid obesity. Pleasant. Good historian. 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      Interim Events:  NO acute events or focal complaints. No c/o hypoglycemia.  Reviewed logs.   She rarely needs sliding scale.  Consistently elevated at bedtime.  States gets hungry so has a small bowl of cereal. Frustrated with weight.  Ortho issues preventing a lot of exercise.     Victoza 1.8 mg weekly.  Lanuts 60 once daily.  Metformin 1000 mg BID.     Took Trulicity in the past and had hypoglycemia. BG was 40. She passed out, had ? seizure and went to the hospital. She was also taking insulin at the time. No history of pancreatitis.               Review of Systems   Constitutional: Negative for activity change and fatigue (fair ).   HENT: Negative for hearing loss and trouble swallowing.    Eyes: Negative for photophobia and visual disturbance.        Last Eye Exam: appt June    Respiratory: Negative for cough and shortness of breath.    Cardiovascular: Negative for chest pain and palpitations.   Gastrointestinal: Negative for constipation and diarrhea.   Genitourinary: Negative for frequency and urgency.   Musculoskeletal: Negative for arthralgias and myalgias.   Integumentary:  Negative for rash and wound.   Neurological: Negative for weakness and numbness.   Psychiatric/Behavioral: Negative for sleep disturbance. The patient is not nervous/anxious.          Objective:      Physical Exam  Constitutional:       Appearance: Normal appearance. She is obese.   HENT:      Head: Normocephalic and atraumatic.      Nose: Nose normal.      Mouth/Throat:      Mouth: Mucous membranes are moist.       "Pharynx: Oropharynx is clear.   Eyes:      Extraocular Movements: Extraocular movements intact.      Conjunctiva/sclera: Conjunctivae normal.      Pupils: Pupils are equal, round, and reactive to light.   Neck:      Vascular: No carotid bruit.   Cardiovascular:      Rate and Rhythm: Normal rate and regular rhythm.      Heart sounds: Normal heart sounds.   Pulmonary:      Effort: Pulmonary effort is normal.      Breath sounds: Normal breath sounds.   Musculoskeletal:         General: Normal range of motion.      Cervical back: Normal range of motion and neck supple.      Right lower leg: No edema.      Left lower leg: No edema.      Comments: Feet:    Compression hose    Lymphadenopathy:      Cervical: No cervical adenopathy.   Skin:     General: Skin is warm and dry.   Neurological:      General: No focal deficit present.      Mental Status: She is alert and oriented to person, place, and time.   Psychiatric:         Mood and Affect: Mood normal.         Behavior: Behavior normal.         Thought Content: Thought content normal.         Judgment: Judgment normal.           /62   Pulse 92   Resp 18   Ht 5' 3.5" (1.613 m)   Wt 110 kg (242 lb 8.1 oz)   BMI 42.28 kg/m²     Hemoglobin A1C   Date Value Ref Range Status   08/03/2022 7.3 (H) 4.0 - 5.6 % Final     Comment:     ADA Screening Guidelines:  5.7-6.4%  Consistent with prediabetes  >or=6.5%  Consistent with diabetes    High levels of fetal hemoglobin interfere with the HbA1C  assay. Heterozygous hemoglobin variants (HbS, HgC, etc)do  not significantly interfere with this assay.   However, presence of multiple variants may affect accuracy.     05/09/2022 7.5 (H) 4.0 - 5.6 % Final     Comment:     ADA Screening Guidelines:  5.7-6.4%  Consistent with prediabetes  >or=6.5%  Consistent with diabetes    High levels of fetal hemoglobin interfere with the HbA1C  assay. Heterozygous hemoglobin variants (HbS, HgC, etc)do  not significantly interfere with this " assay.   However, presence of multiple variants may affect accuracy.     03/28/2022 7.7 (H) 4.8 - 5.6 % Final     Comment:              Prediabetes: 5.7 - 6.4           Diabetes: >6.4           Glycemic control for adults with diabetes: <7.0   01/04/2022 8.0 (H) 4.0 - 5.6 % Final     Comment:     ADA Screening Guidelines:  5.7-6.4%  Consistent with prediabetes  >or=6.5%  Consistent with diabetes    High levels of fetal hemoglobin interfere with the HbA1C  assay. Heterozygous hemoglobin variants (HbS, HgC, etc)do  not significantly interfere with this assay.   However, presence of multiple variants may affect accuracy.         Chemistry        Component Value Date/Time     08/03/2022 0704     (L) 08/25/2015 0855    K 5.0 08/03/2022 0704    K 4.3 08/25/2015 0855     08/03/2022 0704     08/25/2015 0855    CO2 27 08/03/2022 0704    BUN 24 (H) 08/03/2022 0704    CREATININE 1.1 08/03/2022 0704    CREATININE 0.52 08/25/2015 0855     (H) 08/03/2022 0704        Component Value Date/Time    CALCIUM 9.6 08/03/2022 0704    ALKPHOS 55 08/03/2022 0704    AST 16 08/03/2022 0704    ALT 18 08/03/2022 0704    BILITOT 0.2 08/03/2022 0704    ESTGFRAFRICA 46.1 (A) 05/09/2022 0717    EGFRNONAA 40.0 (A) 05/09/2022 0717          Lab Results   Component Value Date    CHOL 142 01/04/2022     Lab Results   Component Value Date    HDL 53 01/04/2022     Lab Results   Component Value Date    LDLCALC 57.6 (L) 01/04/2022     Lab Results   Component Value Date    TRIG 157 (H) 01/04/2022     Lab Results   Component Value Date    CHOLHDL 37.3 01/04/2022     Lab Results   Component Value Date    MICALBCREAT 19.6 01/04/2022     Lab Results   Component Value Date    TSH 2.629 08/03/2022     Vit D, 25-Hydroxy   Date Value Ref Range Status   04/13/2020 31 30 - 96 ng/mL Final     Comment:     Vitamin D deficiency.........<10 ng/mL                              Vitamin D insufficiency......10-29 ng/mL       Vitamin D  sufficiency........> or equal to 30 ng/mL  Vitamin D toxicity............>100 ng/mL           Assessment:       1. Type 2 diabetes mellitus with stage 3a chronic kidney disease, with long-term current use of insulin     2. Essential hypertension  lisinopriL (PRINIVIL,ZESTRIL) 20 MG tablet   3. Mixed hyperlipidemia  pravastatin (PRAVACHOL) 40 MG tablet   4. Stage 3a chronic kidney disease     5. Morbid obesity with BMI of 40.0-44.9, adult  Previously Counseled on options for exercise. --again reinforcec       Plan:       Reinforced SS dose only. COunseling on prandial dose vs correction dose.   contintue 60 lantus  Cont metformin 1000 bid.   Cont victoza 1.8mg     Cont ace, statin   Will try Truliciyt---1.5 mg to start.    Counseled on lower carb options for bedtime snack.     ORDERS 08/10/2022     4 mo w/ fasting lipids, a1c prior

## 2022-09-12 DIAGNOSIS — M06.00 SERONEGATIVE RHEUMATOID ARTHRITIS: ICD-10-CM

## 2022-09-12 DIAGNOSIS — M35.00 SJOGREN'S SYNDROME, WITH UNSPECIFIED ORGAN INVOLVEMENT: ICD-10-CM

## 2022-09-13 RX ORDER — HYDROXYCHLOROQUINE SULFATE 200 MG/1
200 TABLET, FILM COATED ORAL 2 TIMES DAILY
Qty: 60 TABLET | Refills: 6 | Status: SHIPPED | OUTPATIENT
Start: 2022-09-13 | End: 2023-01-25 | Stop reason: SDUPTHER

## 2022-09-23 ENCOUNTER — OFFICE VISIT (OUTPATIENT)
Dept: RHEUMATOLOGY | Facility: CLINIC | Age: 62
End: 2022-09-23
Payer: MEDICAID

## 2022-09-23 VITALS
HEART RATE: 76 BPM | WEIGHT: 235 LBS | DIASTOLIC BLOOD PRESSURE: 72 MMHG | HEIGHT: 63 IN | SYSTOLIC BLOOD PRESSURE: 119 MMHG | BODY MASS INDEX: 41.64 KG/M2

## 2022-09-23 DIAGNOSIS — M06.00 SERONEGATIVE RHEUMATOID ARTHRITIS: Primary | ICD-10-CM

## 2022-09-23 DIAGNOSIS — G89.4 CHRONIC PAIN SYNDROME: ICD-10-CM

## 2022-09-23 DIAGNOSIS — M54.50 LUMBAR BACK PAIN: ICD-10-CM

## 2022-09-23 DIAGNOSIS — M47.812 CERVICAL ARTHRITIS: ICD-10-CM

## 2022-09-23 PROCEDURE — 3051F PR MOST RECENT HEMOGLOBIN A1C LEVEL 7.0 - < 8.0%: ICD-10-PCS | Mod: CPTII,,, | Performed by: PHYSICIAN ASSISTANT

## 2022-09-23 PROCEDURE — 3078F DIAST BP <80 MM HG: CPT | Mod: CPTII,,, | Performed by: PHYSICIAN ASSISTANT

## 2022-09-23 PROCEDURE — 4010F PR ACE/ARB THEARPY RXD/TAKEN: ICD-10-PCS | Mod: CPTII,,, | Performed by: PHYSICIAN ASSISTANT

## 2022-09-23 PROCEDURE — 3051F HG A1C>EQUAL 7.0%<8.0%: CPT | Mod: CPTII,,, | Performed by: PHYSICIAN ASSISTANT

## 2022-09-23 PROCEDURE — 3061F PR NEG MICROALBUMINURIA RESULT DOCUMENTED/REVIEW: ICD-10-PCS | Mod: CPTII,,, | Performed by: PHYSICIAN ASSISTANT

## 2022-09-23 PROCEDURE — 3066F PR DOCUMENTATION OF TREATMENT FOR NEPHROPATHY: ICD-10-PCS | Mod: CPTII,,, | Performed by: PHYSICIAN ASSISTANT

## 2022-09-23 PROCEDURE — 99999 PR PBB SHADOW E&M-EST. PATIENT-LVL IV: ICD-10-PCS | Mod: PBBFAC,,, | Performed by: PHYSICIAN ASSISTANT

## 2022-09-23 PROCEDURE — 3008F PR BODY MASS INDEX (BMI) DOCUMENTED: ICD-10-PCS | Mod: CPTII,,, | Performed by: PHYSICIAN ASSISTANT

## 2022-09-23 PROCEDURE — 1160F PR REVIEW ALL MEDS BY PRESCRIBER/CLIN PHARMACIST DOCUMENTED: ICD-10-PCS | Mod: CPTII,,, | Performed by: PHYSICIAN ASSISTANT

## 2022-09-23 PROCEDURE — 99214 OFFICE O/P EST MOD 30 MIN: CPT | Mod: S$PBB,,, | Performed by: PHYSICIAN ASSISTANT

## 2022-09-23 PROCEDURE — 99214 PR OFFICE/OUTPT VISIT, EST, LEVL IV, 30-39 MIN: ICD-10-PCS | Mod: S$PBB,,, | Performed by: PHYSICIAN ASSISTANT

## 2022-09-23 PROCEDURE — 3078F PR MOST RECENT DIASTOLIC BLOOD PRESSURE < 80 MM HG: ICD-10-PCS | Mod: CPTII,,, | Performed by: PHYSICIAN ASSISTANT

## 2022-09-23 PROCEDURE — 3061F NEG MICROALBUMINURIA REV: CPT | Mod: CPTII,,, | Performed by: PHYSICIAN ASSISTANT

## 2022-09-23 PROCEDURE — 3074F SYST BP LT 130 MM HG: CPT | Mod: CPTII,,, | Performed by: PHYSICIAN ASSISTANT

## 2022-09-23 PROCEDURE — 1160F RVW MEDS BY RX/DR IN RCRD: CPT | Mod: CPTII,,, | Performed by: PHYSICIAN ASSISTANT

## 2022-09-23 PROCEDURE — 3008F BODY MASS INDEX DOCD: CPT | Mod: CPTII,,, | Performed by: PHYSICIAN ASSISTANT

## 2022-09-23 PROCEDURE — 3074F PR MOST RECENT SYSTOLIC BLOOD PRESSURE < 130 MM HG: ICD-10-PCS | Mod: CPTII,,, | Performed by: PHYSICIAN ASSISTANT

## 2022-09-23 PROCEDURE — 99999 PR PBB SHADOW E&M-EST. PATIENT-LVL IV: CPT | Mod: PBBFAC,,, | Performed by: PHYSICIAN ASSISTANT

## 2022-09-23 PROCEDURE — 1159F PR MEDICATION LIST DOCUMENTED IN MEDICAL RECORD: ICD-10-PCS | Mod: CPTII,,, | Performed by: PHYSICIAN ASSISTANT

## 2022-09-23 PROCEDURE — 4010F ACE/ARB THERAPY RXD/TAKEN: CPT | Mod: CPTII,,, | Performed by: PHYSICIAN ASSISTANT

## 2022-09-23 PROCEDURE — 1159F MED LIST DOCD IN RCRD: CPT | Mod: CPTII,,, | Performed by: PHYSICIAN ASSISTANT

## 2022-09-23 PROCEDURE — 3066F NEPHROPATHY DOC TX: CPT | Mod: CPTII,,, | Performed by: PHYSICIAN ASSISTANT

## 2022-09-23 PROCEDURE — 99214 OFFICE O/P EST MOD 30 MIN: CPT | Mod: PBBFAC,PN | Performed by: PHYSICIAN ASSISTANT

## 2022-09-23 RX ORDER — METHOCARBAMOL 750 MG/1
750 TABLET, FILM COATED ORAL 2 TIMES DAILY PRN
Qty: 60 TABLET | Refills: 3 | Status: SHIPPED | OUTPATIENT
Start: 2022-09-23 | End: 2023-06-05

## 2022-09-23 ASSESSMENT — ROUTINE ASSESSMENT OF PATIENT INDEX DATA (RAPID3)
FATIGUE SCORE: 2.2
PATIENT GLOBAL ASSESSMENT SCORE: 4.5
MDHAQ FUNCTION SCORE: 1.3
PAIN SCORE: 4
TOTAL RAPID3 SCORE: 4.28
PSYCHOLOGICAL DISTRESS SCORE: 2.2

## 2022-09-23 NOTE — PROGRESS NOTES
Subjective:       Patient ID: Patricia Fournier is a 62 y.o. female.    Chief Complaint: Disease Management      Mrs. Fournier is a 62 year old female who presents to clinic for follow up on seronegative RA, osteoarthritis, sjogren's syndrome. She is doing fair overall. She denies stiffness in her hands or wrists. She is taking plaquenil 200 mg twice daily and prednisone 2.5-5 mg on rare occasion for generalized flares. She complains of increased low back pain with radiation to the groin bilaterally. No urinary symptoms. She has known osteoarthritis of L4-5 and L5-S1. No radiacular pain. No lower extremity weakness. She has occasional difficulty moving from seated to standing due to knee pain, but overall she is able to function without much limitation.     Ultracet prescribed at last visit, but denied by insurance due to max fill limit #40.    Chlorthalidone was d/angela at her last visit but remains her on med list today--not sure if she is still taking this.     We reviewed her recent labs.     Current tx:  1.  mg bid       Review of Systems   Constitutional:  Positive for fatigue. Negative for activity change, appetite change, chills and fever.   Eyes:  Negative for visual disturbance.   Respiratory:  Negative for cough.    Cardiovascular:  Negative for chest pain.   Gastrointestinal:  Negative for abdominal pain, constipation, diarrhea, nausea and vomiting.   Musculoskeletal:  Positive for arthralgias, back pain, joint swelling and neck pain.   Neurological:  Negative for dizziness, weakness, light-headedness and headaches.       Objective:     Vitals:    22 0850   BP: 119/72   Pulse: 76       Past Medical History:   Diagnosis Date    Diabetes mellitus, type 2     GERD (gastroesophageal reflux disease)     Hypertension      Past Surgical History:   Procedure Laterality Date     SECTION      gallbladder removed      TUBAL LIGATION            Physical Exam   Constitutional: She is oriented to person,  place, and time.   Eyes: Right conjunctiva is not injected. Left conjunctiva is not injected.   Neck: No JVD present. No thyromegaly present.   Cardiovascular: Normal rate. Exam reveals no decreased pulses.   Trace bilateral lower ext edema   Pulmonary/Chest: Effort normal.   Musculoskeletal:         General: No swelling.      Right shoulder: Normal.      Left shoulder: Normal.      Right elbow: Normal.      Left elbow: Normal.      Right wrist: Normal.      Left wrist: Swelling present.      Right knee: Normal.      Left knee: Normal.   Lymphadenopathy:     She has no cervical adenopathy.   Neurological: She is alert and oriented to person, place, and time. Gait normal.   Skin: No rash noted.   Psychiatric: Mood and affect normal.       Right Side Rheumatological Exam     Examination finds the shoulder, elbow, wrist, knee, 1st PIP, 1st MCP, 2nd PIP, 2nd MCP, 3rd PIP, 3rd MCP, 4th PIP, 4th MCP, 5th PIP and 5th MCP normal.    Left Side Rheumatological Exam     Examination finds the shoulder, elbow, knee, 1st PIP, 1st MCP, 2nd PIP, 2nd MCP, 3rd PIP, 3rd MCP, 4th PIP, 4th MCP, 5th PIP and 5th MCP normal.    She has swelling of the wrist      Back/Neck Exam   Tenderness Right paramedian tenderness of the Lower L-Spine and SI Joint.Left paramedian tenderness of the Lower L-Spine and SI Joint.      Recent labs reviewed:  Component      Latest Ref Rng & Units 8/3/2022   WBC      3.90 - 12.70 K/uL 5.55   RBC      4.00 - 5.40 M/uL 3.52 (L)   Hemoglobin      12.0 - 16.0 g/dL 10.6 (L)   Hematocrit      37.0 - 48.5 % 33.0 (L)   MCV      82 - 98 fL 94   MCH      27.0 - 31.0 pg 30.1   MCHC      32.0 - 36.0 g/dL 32.1   RDW      11.5 - 14.5 % 12.6   Platelets      150 - 450 K/uL 255   MPV      9.2 - 12.9 fL 10.7   Immature Granulocytes      0.0 - 0.5 % 0.2   Gran # (ANC)      1.8 - 7.7 K/uL 3.0   Immature Grans (Abs)      0.00 - 0.04 K/uL 0.01   Lymph #      1.0 - 4.8 K/uL 1.6   Mono #      0.3 - 1.0 K/uL 0.7   Eos #      0.0 - 0.5  K/uL 0.3   Baso #      0.00 - 0.20 K/uL 0.04   nRBC      0 /100 WBC 0   Gran %      38.0 - 73.0 % 54.4   Lymph %      18.0 - 48.0 % 28.3   Mono %      4.0 - 15.0 % 11.9   Eosinophil %      0.0 - 8.0 % 4.5   Basophil %      0.0 - 1.9 % 0.7   Differential Method       Automated   Sodium      136 - 145 mmol/L 140   Potassium      3.5 - 5.1 mmol/L 5.0   Chloride      95 - 110 mmol/L 105   CO2      23 - 29 mmol/L 27   Glucose      70 - 110 mg/dL 130 (H)   BUN      8 - 23 mg/dL 24 (H)   Creatinine      0.5 - 1.4 mg/dL 1.1   Calcium      8.7 - 10.5 mg/dL 9.6   PROTEIN TOTAL      6.0 - 8.4 g/dL 6.8   Albumin      3.5 - 5.2 g/dL 3.7   BILIRUBIN TOTAL      0.1 - 1.0 mg/dL 0.2   Alkaline Phosphatase      55 - 135 U/L 55   AST      10 - 40 U/L 16   ALT      10 - 44 U/L 18   Anion Gap      8 - 16 mmol/L 8   eGFR      >60 mL/min/1.73 m:2 56.1 (A)   Anti Sm Antibody      0.00 - 0.99 Ratio 0.10   Anti-Sm Interpretation      Negative Negative   Anti-SSA Antibody      0.00 - 0.99 Ratio 0.06   Anti-SSA Interpretation      Negative Negative   Anti-SSB Antibody      0.00 - 0.99 Ratio 0.07   Anti-SSB Interpretation      Negative Negative   ds DNA Ab      Negative 1:10 Negative 1:10   Anti Sm/RNP Antibody      0.00 - 0.99 Ratio 0.09   Anti-Sm/RNP Interpretation      Negative Negative   Hemoglobin A1C External      4.0 - 5.6 % 7.3 (H)   Estimated Avg Glucose      68 - 131 mg/dL 163 (H)   Sed Rate      0 - 36 mm/Hr 9   CRP      0.0 - 8.2 mg/L 2.0   Rheumatoid Factor      0.0 - 15.0 IU/mL <13.0   CCP Antibodies      <5.0 U/mL <0.5   GRACIELA Screen      Negative <1:80 Positive (A)   TSH      0.400 - 4.000 uIU/mL 2.629   Free T4      0.71 - 1.51 ng/dL 0.97   Vitamin B-12      210 - 950 pg/mL 295   GRACIELA PATTERN 1       Homogeneous   GRACIELA Titer 1       1:160       Assessment:       1. Seronegative rheumatoid arthritis    2. Lumbar back pain    3. Cervical arthritis    4. Chronic pain syndrome              Plan:       Seronegative rheumatoid  arthritis  -     CBC Auto Differential; Future; Expected date: 09/23/2022  -     Comprehensive Metabolic Panel; Future; Expected date: 09/23/2022  -     C-Reactive Protein; Future; Expected date: 09/23/2022  -     Sedimentation rate; Future; Expected date: 09/23/2022    Lumbar back pain  -     methocarbamoL (ROBAXIN) 750 MG Tab; Take 1 tablet (750 mg total) by mouth 2 (two) times daily as needed (muscle spasm).  Dispense: 60 tablet; Refill: 3    Cervical arthritis  -     methocarbamoL (ROBAXIN) 750 MG Tab; Take 1 tablet (750 mg total) by mouth 2 (two) times daily as needed (muscle spasm).  Dispense: 60 tablet; Refill: 3    Chronic pain syndrome        Assessment:  62 year old female with  Seronegative RA, Sjogren's syndrome, GRACIELA 1:160 homogenous on plaquenil  --osteoarthritis cervical and lumbar spine  --CKD stage 3  --uncontrolled type 2 diabetes with hyperglycemia, last HgbA1c 7.3%  --pulmonary nodules, restrictive lung disease followed by Pulmonary  --hx of lap band  --normocytic anemia    Plan:  1. Cont Plaquenil 200 mg bid  2. Discontinue chlorthalidone if not already  3. Cont robaxin bid PRN  4. Cont evoxac daily prn for dry mouth  5. Ultracet sent to MD.  I have checked [place] prescription monitoring program site and no unusual or abnormal behavior has occurred pt understand the risk and benefits of taking opioid medications and has decided to continue the medication.  6. Consider PT for lumbar spine pain      Follow up:  4 mo w/labs prior

## 2022-09-25 RX ORDER — TRAMADOL HYDROCHLORIDE AND ACETAMINOPHEN 37.5; 325 MG/1; MG/1
1 TABLET ORAL EVERY 12 HOURS PRN
Qty: 40 TABLET | Refills: 1 | Status: SHIPPED | OUTPATIENT
Start: 2022-09-25 | End: 2023-01-25 | Stop reason: SDUPTHER

## 2023-01-18 ENCOUNTER — LAB VISIT (OUTPATIENT)
Dept: LAB | Facility: HOSPITAL | Age: 63
End: 2023-01-18
Attending: PHYSICIAN ASSISTANT
Payer: MEDICAID

## 2023-01-18 DIAGNOSIS — M06.00 SERONEGATIVE RHEUMATOID ARTHRITIS: ICD-10-CM

## 2023-01-18 DIAGNOSIS — Z79.4 TYPE 2 DIABETES MELLITUS WITH STAGE 3A CHRONIC KIDNEY DISEASE, WITH LONG-TERM CURRENT USE OF INSULIN: ICD-10-CM

## 2023-01-18 DIAGNOSIS — N18.31 TYPE 2 DIABETES MELLITUS WITH STAGE 3A CHRONIC KIDNEY DISEASE, WITH LONG-TERM CURRENT USE OF INSULIN: ICD-10-CM

## 2023-01-18 DIAGNOSIS — E11.22 TYPE 2 DIABETES MELLITUS WITH STAGE 3A CHRONIC KIDNEY DISEASE, WITH LONG-TERM CURRENT USE OF INSULIN: ICD-10-CM

## 2023-01-18 LAB
ALBUMIN SERPL BCP-MCNC: 3.8 G/DL (ref 3.5–5.2)
ALP SERPL-CCNC: 51 U/L (ref 55–135)
ALT SERPL W/O P-5'-P-CCNC: 17 U/L (ref 10–44)
ANION GAP SERPL CALC-SCNC: 7 MMOL/L (ref 8–16)
AST SERPL-CCNC: 16 U/L (ref 10–40)
BASOPHILS # BLD AUTO: 0.03 K/UL (ref 0–0.2)
BASOPHILS NFR BLD: 0.7 % (ref 0–1.9)
BILIRUB SERPL-MCNC: 0.4 MG/DL (ref 0.1–1)
BUN SERPL-MCNC: 30 MG/DL (ref 8–23)
CALCIUM SERPL-MCNC: 9 MG/DL (ref 8.7–10.5)
CHLORIDE SERPL-SCNC: 105 MMOL/L (ref 95–110)
CO2 SERPL-SCNC: 27 MMOL/L (ref 23–29)
CREAT SERPL-MCNC: 1.3 MG/DL (ref 0.5–1.4)
CRP SERPL-MCNC: 0.8 MG/L (ref 0–8.2)
DIFFERENTIAL METHOD: ABNORMAL
EOSINOPHIL # BLD AUTO: 0.2 K/UL (ref 0–0.5)
EOSINOPHIL NFR BLD: 5.4 % (ref 0–8)
ERYTHROCYTE [DISTWIDTH] IN BLOOD BY AUTOMATED COUNT: 13.1 % (ref 11.5–14.5)
ERYTHROCYTE [SEDIMENTATION RATE] IN BLOOD BY PHOTOMETRIC METHOD: 5 MM/HR (ref 0–36)
EST. GFR  (NO RACE VARIABLE): 45.9 ML/MIN/1.73 M^2
ESTIMATED AVG GLUCOSE: 154 MG/DL (ref 68–131)
GLUCOSE SERPL-MCNC: 135 MG/DL (ref 70–110)
HBA1C MFR BLD: 7 % (ref 4–5.6)
HCT VFR BLD AUTO: 33.5 % (ref 37–48.5)
HGB BLD-MCNC: 10.4 G/DL (ref 12–16)
IMM GRANULOCYTES # BLD AUTO: 0 K/UL (ref 0–0.04)
IMM GRANULOCYTES NFR BLD AUTO: 0 % (ref 0–0.5)
LYMPHOCYTES # BLD AUTO: 1.2 K/UL (ref 1–4.8)
LYMPHOCYTES NFR BLD: 28.4 % (ref 18–48)
MCH RBC QN AUTO: 28.8 PG (ref 27–31)
MCHC RBC AUTO-ENTMCNC: 31 G/DL (ref 32–36)
MCV RBC AUTO: 93 FL (ref 82–98)
MONOCYTES # BLD AUTO: 0.5 K/UL (ref 0.3–1)
MONOCYTES NFR BLD: 11 % (ref 4–15)
NEUTROPHILS # BLD AUTO: 2.3 K/UL (ref 1.8–7.7)
NEUTROPHILS NFR BLD: 54.5 % (ref 38–73)
NRBC BLD-RTO: 0 /100 WBC
PLATELET # BLD AUTO: 256 K/UL (ref 150–450)
PMV BLD AUTO: 10.8 FL (ref 9.2–12.9)
POTASSIUM SERPL-SCNC: 4.5 MMOL/L (ref 3.5–5.1)
PROT SERPL-MCNC: 6.9 G/DL (ref 6–8.4)
RBC # BLD AUTO: 3.61 M/UL (ref 4–5.4)
SODIUM SERPL-SCNC: 139 MMOL/L (ref 136–145)
WBC # BLD AUTO: 4.26 K/UL (ref 3.9–12.7)

## 2023-01-18 PROCEDURE — 36415 COLL VENOUS BLD VENIPUNCTURE: CPT | Mod: PO | Performed by: PHYSICIAN ASSISTANT

## 2023-01-18 PROCEDURE — 86140 C-REACTIVE PROTEIN: CPT | Performed by: PHYSICIAN ASSISTANT

## 2023-01-18 PROCEDURE — 83036 HEMOGLOBIN GLYCOSYLATED A1C: CPT | Performed by: NURSE PRACTITIONER

## 2023-01-18 PROCEDURE — 85652 RBC SED RATE AUTOMATED: CPT | Performed by: PHYSICIAN ASSISTANT

## 2023-01-18 PROCEDURE — 80053 COMPREHEN METABOLIC PANEL: CPT | Performed by: PHYSICIAN ASSISTANT

## 2023-01-18 PROCEDURE — 85025 COMPLETE CBC W/AUTO DIFF WBC: CPT | Performed by: PHYSICIAN ASSISTANT

## 2023-01-19 ENCOUNTER — TELEPHONE (OUTPATIENT)
Dept: RHEUMATOLOGY | Facility: CLINIC | Age: 63
End: 2023-01-19
Payer: MEDICAID

## 2023-01-19 NOTE — TELEPHONE ENCOUNTER
----- Message from Areli Angulo PA-C sent at 1/18/2023  2:33 PM CST -----  Labs are stable. Please keep your follow up visit with Dr. Wilson as scheduled to review results in further detail.

## 2023-01-25 ENCOUNTER — OFFICE VISIT (OUTPATIENT)
Dept: RHEUMATOLOGY | Facility: CLINIC | Age: 63
End: 2023-01-25
Payer: MEDICAID

## 2023-01-25 VITALS
WEIGHT: 248.69 LBS | SYSTOLIC BLOOD PRESSURE: 83 MMHG | BODY MASS INDEX: 44.06 KG/M2 | DIASTOLIC BLOOD PRESSURE: 50 MMHG | HEIGHT: 63 IN | HEART RATE: 88 BPM

## 2023-01-25 DIAGNOSIS — M54.50 LUMBAR BACK PAIN: ICD-10-CM

## 2023-01-25 DIAGNOSIS — M47.812 CERVICAL ARTHRITIS: ICD-10-CM

## 2023-01-25 DIAGNOSIS — G89.4 CHRONIC PAIN SYNDROME: ICD-10-CM

## 2023-01-25 DIAGNOSIS — M35.00 SJOGREN'S SYNDROME, WITH UNSPECIFIED ORGAN INVOLVEMENT: ICD-10-CM

## 2023-01-25 DIAGNOSIS — M06.00 SERONEGATIVE RHEUMATOID ARTHRITIS: ICD-10-CM

## 2023-01-25 PROCEDURE — 99214 PR OFFICE/OUTPT VISIT, EST, LEVL IV, 30-39 MIN: ICD-10-PCS | Mod: 25,S$PBB,, | Performed by: INTERNAL MEDICINE

## 2023-01-25 PROCEDURE — 96372 THER/PROPH/DIAG INJ SC/IM: CPT | Mod: PBBFAC,PN

## 2023-01-25 PROCEDURE — 99214 OFFICE O/P EST MOD 30 MIN: CPT | Mod: PBBFAC,PN | Performed by: INTERNAL MEDICINE

## 2023-01-25 PROCEDURE — 3074F PR MOST RECENT SYSTOLIC BLOOD PRESSURE < 130 MM HG: ICD-10-PCS | Mod: CPTII,,, | Performed by: INTERNAL MEDICINE

## 2023-01-25 PROCEDURE — 99214 OFFICE O/P EST MOD 30 MIN: CPT | Mod: 25,S$PBB,, | Performed by: INTERNAL MEDICINE

## 2023-01-25 PROCEDURE — 3074F SYST BP LT 130 MM HG: CPT | Mod: CPTII,,, | Performed by: INTERNAL MEDICINE

## 2023-01-25 PROCEDURE — 1159F MED LIST DOCD IN RCRD: CPT | Mod: CPTII,,, | Performed by: INTERNAL MEDICINE

## 2023-01-25 PROCEDURE — 99999 PR PBB SHADOW E&M-EST. PATIENT-LVL IV: CPT | Mod: PBBFAC,,, | Performed by: INTERNAL MEDICINE

## 2023-01-25 PROCEDURE — 3051F HG A1C>EQUAL 7.0%<8.0%: CPT | Mod: CPTII,,, | Performed by: INTERNAL MEDICINE

## 2023-01-25 PROCEDURE — 1159F PR MEDICATION LIST DOCUMENTED IN MEDICAL RECORD: ICD-10-PCS | Mod: CPTII,,, | Performed by: INTERNAL MEDICINE

## 2023-01-25 PROCEDURE — 3078F PR MOST RECENT DIASTOLIC BLOOD PRESSURE < 80 MM HG: ICD-10-PCS | Mod: CPTII,,, | Performed by: INTERNAL MEDICINE

## 2023-01-25 PROCEDURE — 3051F PR MOST RECENT HEMOGLOBIN A1C LEVEL 7.0 - < 8.0%: ICD-10-PCS | Mod: CPTII,,, | Performed by: INTERNAL MEDICINE

## 2023-01-25 PROCEDURE — 3008F BODY MASS INDEX DOCD: CPT | Mod: CPTII,,, | Performed by: INTERNAL MEDICINE

## 2023-01-25 PROCEDURE — 99999 PR PBB SHADOW E&M-EST. PATIENT-LVL IV: ICD-10-PCS | Mod: PBBFAC,,, | Performed by: INTERNAL MEDICINE

## 2023-01-25 PROCEDURE — 3008F PR BODY MASS INDEX (BMI) DOCUMENTED: ICD-10-PCS | Mod: CPTII,,, | Performed by: INTERNAL MEDICINE

## 2023-01-25 PROCEDURE — 3078F DIAST BP <80 MM HG: CPT | Mod: CPTII,,, | Performed by: INTERNAL MEDICINE

## 2023-01-25 RX ORDER — HYDROXYCHLOROQUINE SULFATE 200 MG/1
200 TABLET, FILM COATED ORAL 2 TIMES DAILY
Qty: 60 TABLET | Refills: 6 | Status: SHIPPED | OUTPATIENT
Start: 2023-01-25 | End: 2023-05-23 | Stop reason: SDUPTHER

## 2023-01-25 RX ORDER — TRAMADOL HYDROCHLORIDE AND ACETAMINOPHEN 37.5; 325 MG/1; MG/1
1 TABLET, FILM COATED ORAL EVERY 12 HOURS PRN
Qty: 40 TABLET | Refills: 0 | Status: SHIPPED | OUTPATIENT
Start: 2023-01-25 | End: 2023-02-24

## 2023-01-25 RX ORDER — CYANOCOBALAMIN 1000 UG/ML
1000 INJECTION, SOLUTION INTRAMUSCULAR; SUBCUTANEOUS
Status: COMPLETED | OUTPATIENT
Start: 2023-01-25 | End: 2023-01-25

## 2023-01-25 RX ADMIN — CYANOCOBALAMIN 1000 MCG: 1000 INJECTION, SOLUTION INTRAMUSCULAR at 04:01

## 2023-01-25 ASSESSMENT — ROUTINE ASSESSMENT OF PATIENT INDEX DATA (RAPID3)
PSYCHOLOGICAL DISTRESS SCORE: 0
MDHAQ FUNCTION SCORE: 1.5
PAIN SCORE: 5
PATIENT GLOBAL ASSESSMENT SCORE: 3
TOTAL RAPID3 SCORE: 4.33
FATIGUE SCORE: 1.1

## 2023-01-26 NOTE — PROGRESS NOTES
Administered 1 cc ( 1000 mcg/ml ) of b12 to the left upper outer arm. Informed of s/s to report verbalized understanding. No adverse reactions noted.

## 2023-02-03 ENCOUNTER — OFFICE VISIT (OUTPATIENT)
Dept: ENDOCRINOLOGY | Facility: CLINIC | Age: 63
End: 2023-02-03
Payer: MEDICAID

## 2023-02-03 VITALS
HEART RATE: 93 BPM | DIASTOLIC BLOOD PRESSURE: 60 MMHG | WEIGHT: 247.81 LBS | HEIGHT: 62 IN | BODY MASS INDEX: 45.6 KG/M2 | SYSTOLIC BLOOD PRESSURE: 114 MMHG

## 2023-02-03 DIAGNOSIS — E78.2 MIXED HYPERLIPIDEMIA: ICD-10-CM

## 2023-02-03 DIAGNOSIS — I10 ESSENTIAL HYPERTENSION: ICD-10-CM

## 2023-02-03 DIAGNOSIS — E11.22 TYPE 2 DIABETES MELLITUS WITH STAGE 3A CHRONIC KIDNEY DISEASE, WITH LONG-TERM CURRENT USE OF INSULIN: Primary | ICD-10-CM

## 2023-02-03 DIAGNOSIS — Z79.4 TYPE 2 DIABETES MELLITUS WITH STAGE 3A CHRONIC KIDNEY DISEASE, WITH LONG-TERM CURRENT USE OF INSULIN: Primary | ICD-10-CM

## 2023-02-03 DIAGNOSIS — N18.32 STAGE 3B CHRONIC KIDNEY DISEASE: ICD-10-CM

## 2023-02-03 DIAGNOSIS — N18.31 TYPE 2 DIABETES MELLITUS WITH STAGE 3A CHRONIC KIDNEY DISEASE, WITH LONG-TERM CURRENT USE OF INSULIN: Primary | ICD-10-CM

## 2023-02-03 PROCEDURE — 99214 PR OFFICE/OUTPT VISIT, EST, LEVL IV, 30-39 MIN: ICD-10-PCS | Mod: S$PBB,,, | Performed by: NURSE PRACTITIONER

## 2023-02-03 PROCEDURE — 1160F PR REVIEW ALL MEDS BY PRESCRIBER/CLIN PHARMACIST DOCUMENTED: ICD-10-PCS | Mod: CPTII,,, | Performed by: NURSE PRACTITIONER

## 2023-02-03 PROCEDURE — 3078F DIAST BP <80 MM HG: CPT | Mod: CPTII,,, | Performed by: NURSE PRACTITIONER

## 2023-02-03 PROCEDURE — 3051F PR MOST RECENT HEMOGLOBIN A1C LEVEL 7.0 - < 8.0%: ICD-10-PCS | Mod: CPTII,,, | Performed by: NURSE PRACTITIONER

## 2023-02-03 PROCEDURE — 99214 OFFICE O/P EST MOD 30 MIN: CPT | Mod: S$PBB,,, | Performed by: NURSE PRACTITIONER

## 2023-02-03 PROCEDURE — 3008F BODY MASS INDEX DOCD: CPT | Mod: CPTII,,, | Performed by: NURSE PRACTITIONER

## 2023-02-03 PROCEDURE — 99999 PR PBB SHADOW E&M-EST. PATIENT-LVL IV: CPT | Mod: PBBFAC,,, | Performed by: NURSE PRACTITIONER

## 2023-02-03 PROCEDURE — 3008F PR BODY MASS INDEX (BMI) DOCUMENTED: ICD-10-PCS | Mod: CPTII,,, | Performed by: NURSE PRACTITIONER

## 2023-02-03 PROCEDURE — 3078F PR MOST RECENT DIASTOLIC BLOOD PRESSURE < 80 MM HG: ICD-10-PCS | Mod: CPTII,,, | Performed by: NURSE PRACTITIONER

## 2023-02-03 PROCEDURE — 1160F RVW MEDS BY RX/DR IN RCRD: CPT | Mod: CPTII,,, | Performed by: NURSE PRACTITIONER

## 2023-02-03 PROCEDURE — 3074F SYST BP LT 130 MM HG: CPT | Mod: CPTII,,, | Performed by: NURSE PRACTITIONER

## 2023-02-03 PROCEDURE — 1159F MED LIST DOCD IN RCRD: CPT | Mod: CPTII,,, | Performed by: NURSE PRACTITIONER

## 2023-02-03 PROCEDURE — 1159F PR MEDICATION LIST DOCUMENTED IN MEDICAL RECORD: ICD-10-PCS | Mod: CPTII,,, | Performed by: NURSE PRACTITIONER

## 2023-02-03 PROCEDURE — 3074F PR MOST RECENT SYSTOLIC BLOOD PRESSURE < 130 MM HG: ICD-10-PCS | Mod: CPTII,,, | Performed by: NURSE PRACTITIONER

## 2023-02-03 PROCEDURE — 99999 PR PBB SHADOW E&M-EST. PATIENT-LVL IV: ICD-10-PCS | Mod: PBBFAC,,, | Performed by: NURSE PRACTITIONER

## 2023-02-03 PROCEDURE — 99214 OFFICE O/P EST MOD 30 MIN: CPT | Mod: PBBFAC,PO | Performed by: NURSE PRACTITIONER

## 2023-02-03 PROCEDURE — 3051F HG A1C>EQUAL 7.0%<8.0%: CPT | Mod: CPTII,,, | Performed by: NURSE PRACTITIONER

## 2023-02-03 NOTE — PATIENT INSTRUCTIONS
Check for Pharmacy cap with new medicare plan.     Once you hit this cap--your drugs will be a lot more than just the co pay , and possibly full gaytan       See if Dr. Bentley is on provider for endocrine--it includes me.

## 2023-02-03 NOTE — PROGRESS NOTES
Patient ID: Patricia Fournier is a 62 y.o. female.    Chief Complaint: No chief complaint on file.    HPI:  Pt is a 62 y.o. wf  with a diagnosis of Type 2 diabetes mellitus diagnosed approximately 2000 , as well as chronic conditions pending review including HTN, HLP.  Other pertinent medical and social information noted includes, but not limited to: morbid obesity. Pleasant. Good historian. 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      Interim Events:  NO acute events or focal complaints. No c/o hypoglycemia.  Reviewed logs.   She rarely needs sliding scale. Frustrated with weight.  Ortho issues preventing a lot of exercise.       Lanuts 60 once daily.  Metformin 1000 mg BID.   Truliciyt 1.5 mg                  Review of Systems   Constitutional:  Negative for activity change and fatigue (fair ).   HENT:  Negative for hearing loss and trouble swallowing.    Eyes:  Negative for photophobia and visual disturbance.        Last Eye Exam: appt June    Respiratory:  Negative for cough and shortness of breath.    Cardiovascular:  Negative for chest pain and palpitations.   Gastrointestinal:  Negative for constipation and diarrhea.   Genitourinary:  Negative for frequency and urgency.   Musculoskeletal:  Negative for arthralgias and myalgias.   Integumentary:  Negative for rash and wound.   Neurological:  Negative for weakness and numbness.   Psychiatric/Behavioral:  Negative for sleep disturbance. The patient is not nervous/anxious.        Objective:      Physical Exam  Constitutional:       Appearance: Normal appearance. She is obese.   HENT:      Head: Normocephalic and atraumatic.      Nose: Nose normal.      Mouth/Throat:      Mouth: Mucous membranes are moist.      Pharynx: Oropharynx is clear.   Eyes:      Extraocular Movements: Extraocular movements intact.      Conjunctiva/sclera: Conjunctivae normal.      Pupils: Pupils are equal, round, and reactive to light.   Neck:      Vascular: No carotid bruit.   Cardiovascular:      Rate  "and Rhythm: Normal rate and regular rhythm.      Heart sounds: Normal heart sounds.   Pulmonary:      Effort: Pulmonary effort is normal.      Breath sounds: Normal breath sounds.   Musculoskeletal:         General: Normal range of motion.      Cervical back: Normal range of motion and neck supple.      Right lower leg: No edema.      Left lower leg: No edema.      Comments: Feet:    Compression hose    Lymphadenopathy:      Cervical: No cervical adenopathy.   Skin:     General: Skin is warm and dry.   Neurological:      General: No focal deficit present.      Mental Status: She is alert and oriented to person, place, and time.   Psychiatric:         Mood and Affect: Mood normal.         Behavior: Behavior normal.         Thought Content: Thought content normal.         Judgment: Judgment normal.         /60   Pulse 93   Ht 5' 2" (1.575 m)   Wt 112.4 kg (247 lb 12.8 oz)   BMI 45.32 kg/m²     Hemoglobin A1C   Date Value Ref Range Status   01/18/2023 7.0 (H) 4.0 - 5.6 % Final     Comment:     ADA Screening Guidelines:  5.7-6.4%  Consistent with prediabetes  >or=6.5%  Consistent with diabetes    High levels of fetal hemoglobin interfere with the HbA1C  assay. Heterozygous hemoglobin variants (HbS, HgC, etc)do  not significantly interfere with this assay.   However, presence of multiple variants may affect accuracy.     08/03/2022 7.3 (H) 4.0 - 5.6 % Final     Comment:     ADA Screening Guidelines:  5.7-6.4%  Consistent with prediabetes  >or=6.5%  Consistent with diabetes    High levels of fetal hemoglobin interfere with the HbA1C  assay. Heterozygous hemoglobin variants (HbS, HgC, etc)do  not significantly interfere with this assay.   However, presence of multiple variants may affect accuracy.     05/09/2022 7.5 (H) 4.0 - 5.6 % Final     Comment:     ADA Screening Guidelines:  5.7-6.4%  Consistent with prediabetes  >or=6.5%  Consistent with diabetes    High levels of fetal hemoglobin interfere with the " HbA1C  assay. Heterozygous hemoglobin variants (HbS, HgC, etc)do  not significantly interfere with this assay.   However, presence of multiple variants may affect accuracy.         Chemistry        Component Value Date/Time     01/18/2023 0710     (L) 08/25/2015 0855    K 4.5 01/18/2023 0710    K 4.3 08/25/2015 0855     01/18/2023 0710     08/25/2015 0855    CO2 27 01/18/2023 0710    BUN 30 (H) 01/18/2023 0710    CREATININE 1.3 01/18/2023 0710    CREATININE 0.52 08/25/2015 0855     (H) 01/18/2023 0710        Component Value Date/Time    CALCIUM 9.0 01/18/2023 0710    ALKPHOS 51 (L) 01/18/2023 0710    AST 16 01/18/2023 0710    ALT 17 01/18/2023 0710    BILITOT 0.4 01/18/2023 0710    ESTGFRAFRICA 46.1 (A) 05/09/2022 0717    EGFRNONAA 40.0 (A) 05/09/2022 0717          Lab Results   Component Value Date    CHOL 142 01/04/2022     Lab Results   Component Value Date    HDL 53 01/04/2022     Lab Results   Component Value Date    LDLCALC 57.6 (L) 01/04/2022     Lab Results   Component Value Date    TRIG 157 (H) 01/04/2022     Lab Results   Component Value Date    CHOLHDL 37.3 01/04/2022     Lab Results   Component Value Date    MICALBCREAT 19.6 01/04/2022     Lab Results   Component Value Date    TSH 2.629 08/03/2022     Vit D, 25-Hydroxy   Date Value Ref Range Status   04/13/2020 31 30 - 96 ng/mL Final     Comment:     Vitamin D deficiency.........<10 ng/mL                              Vitamin D insufficiency......10-29 ng/mL       Vitamin D sufficiency........> or equal to 30 ng/mL  Vitamin D toxicity............>100 ng/mL           Assessment:         1. Type 2 diabetes mellitus with stage 3a chronic kidney disease, with long-term current use of insulin  Hemoglobin A1C    Lipid Panel    Hemoglobin A1C    Lipid Panel      2. Stage 3b chronic kidney disease        3. Essential hypertension        4. Mixed hyperlipidemia            Plan:       Reinforced SS dose only. COunseling on prandial  dose vs correction dose.   contintue 60 lantus  Cont metformin 1000 bid.       Cont ace, statin   Cont Truliciyt---1.5 mg to start.    Counseled on lower carb options for bedtime snack.     ORDERS 02/03/2023     6 mo w/ fasting lipids, a1c prior

## 2023-05-15 ENCOUNTER — LAB VISIT (OUTPATIENT)
Dept: LAB | Facility: HOSPITAL | Age: 63
End: 2023-05-15
Attending: INTERNAL MEDICINE
Payer: MEDICAID

## 2023-05-15 DIAGNOSIS — M06.00 SERONEGATIVE RHEUMATOID ARTHRITIS: ICD-10-CM

## 2023-05-15 DIAGNOSIS — M47.812 CERVICAL ARTHRITIS: ICD-10-CM

## 2023-05-15 DIAGNOSIS — M54.50 LUMBAR BACK PAIN: ICD-10-CM

## 2023-05-15 DIAGNOSIS — M35.00 SJOGREN'S SYNDROME, WITH UNSPECIFIED ORGAN INVOLVEMENT: ICD-10-CM

## 2023-05-15 DIAGNOSIS — G89.4 CHRONIC PAIN SYNDROME: ICD-10-CM

## 2023-05-15 LAB
25(OH)D3+25(OH)D2 SERPL-MCNC: 25 NG/ML (ref 30–96)
ALBUMIN SERPL BCP-MCNC: 3.7 G/DL (ref 3.5–5.2)
ALP SERPL-CCNC: 53 U/L (ref 55–135)
ALT SERPL W/O P-5'-P-CCNC: 15 U/L (ref 10–44)
ANION GAP SERPL CALC-SCNC: 8 MMOL/L (ref 8–16)
AST SERPL-CCNC: 16 U/L (ref 10–40)
BASOPHILS # BLD AUTO: 0.02 K/UL (ref 0–0.2)
BASOPHILS NFR BLD: 0.4 % (ref 0–1.9)
BILIRUB SERPL-MCNC: 0.2 MG/DL (ref 0.1–1)
BUN SERPL-MCNC: 29 MG/DL (ref 8–23)
CALCIUM SERPL-MCNC: 9.3 MG/DL (ref 8.7–10.5)
CHLORIDE SERPL-SCNC: 105 MMOL/L (ref 95–110)
CO2 SERPL-SCNC: 25 MMOL/L (ref 23–29)
CREAT SERPL-MCNC: 1.1 MG/DL (ref 0.5–1.4)
CRP SERPL-MCNC: 0.9 MG/L (ref 0–8.2)
DIFFERENTIAL METHOD: ABNORMAL
EOSINOPHIL # BLD AUTO: 0.3 K/UL (ref 0–0.5)
EOSINOPHIL NFR BLD: 6.1 % (ref 0–8)
ERYTHROCYTE [DISTWIDTH] IN BLOOD BY AUTOMATED COUNT: 13.5 % (ref 11.5–14.5)
ERYTHROCYTE [SEDIMENTATION RATE] IN BLOOD BY PHOTOMETRIC METHOD: 14 MM/HR (ref 0–36)
EST. GFR  (NO RACE VARIABLE): 56.1 ML/MIN/1.73 M^2
GLUCOSE SERPL-MCNC: 168 MG/DL (ref 70–110)
HCT VFR BLD AUTO: 33.8 % (ref 37–48.5)
HGB BLD-MCNC: 10.6 G/DL (ref 12–16)
IMM GRANULOCYTES # BLD AUTO: 0.02 K/UL (ref 0–0.04)
IMM GRANULOCYTES NFR BLD AUTO: 0.4 % (ref 0–0.5)
IRON SERPL-MCNC: 83 UG/DL (ref 30–160)
LYMPHOCYTES # BLD AUTO: 1.5 K/UL (ref 1–4.8)
LYMPHOCYTES NFR BLD: 30.5 % (ref 18–48)
MCH RBC QN AUTO: 28.7 PG (ref 27–31)
MCHC RBC AUTO-ENTMCNC: 31.4 G/DL (ref 32–36)
MCV RBC AUTO: 92 FL (ref 82–98)
MONOCYTES # BLD AUTO: 0.5 K/UL (ref 0.3–1)
MONOCYTES NFR BLD: 10.9 % (ref 4–15)
NEUTROPHILS # BLD AUTO: 2.5 K/UL (ref 1.8–7.7)
NEUTROPHILS NFR BLD: 51.7 % (ref 38–73)
NRBC BLD-RTO: 0 /100 WBC
PLATELET # BLD AUTO: 219 K/UL (ref 150–450)
PMV BLD AUTO: 10.7 FL (ref 9.2–12.9)
POTASSIUM SERPL-SCNC: 5.3 MMOL/L (ref 3.5–5.1)
PROT SERPL-MCNC: 6.9 G/DL (ref 6–8.4)
RBC # BLD AUTO: 3.69 M/UL (ref 4–5.4)
SATURATED IRON: 19 % (ref 20–50)
SODIUM SERPL-SCNC: 138 MMOL/L (ref 136–145)
T3 SERPL-MCNC: 79 NG/DL (ref 60–180)
T4 FREE SERPL-MCNC: 0.79 NG/DL (ref 0.71–1.51)
TOTAL IRON BINDING CAPACITY: 432 UG/DL (ref 250–450)
TRANSFERRIN SERPL-MCNC: 292 MG/DL (ref 200–375)
TSH SERPL DL<=0.005 MIU/L-ACNC: 2.88 UIU/ML (ref 0.4–4)
VIT B12 SERPL-MCNC: >2000 PG/ML (ref 210–950)
WBC # BLD AUTO: 4.76 K/UL (ref 3.9–12.7)

## 2023-05-15 PROCEDURE — 84439 ASSAY OF FREE THYROXINE: CPT | Performed by: INTERNAL MEDICINE

## 2023-05-15 PROCEDURE — 36415 COLL VENOUS BLD VENIPUNCTURE: CPT | Mod: PO | Performed by: INTERNAL MEDICINE

## 2023-05-15 PROCEDURE — 85652 RBC SED RATE AUTOMATED: CPT | Performed by: INTERNAL MEDICINE

## 2023-05-15 PROCEDURE — 82607 VITAMIN B-12: CPT | Performed by: INTERNAL MEDICINE

## 2023-05-15 PROCEDURE — 80053 COMPREHEN METABOLIC PANEL: CPT | Performed by: INTERNAL MEDICINE

## 2023-05-15 PROCEDURE — 84443 ASSAY THYROID STIM HORMONE: CPT | Performed by: INTERNAL MEDICINE

## 2023-05-15 PROCEDURE — 86235 NUCLEAR ANTIGEN ANTIBODY: CPT | Mod: 59 | Performed by: INTERNAL MEDICINE

## 2023-05-15 PROCEDURE — 86039 ANTINUCLEAR ANTIBODIES (ANA): CPT | Performed by: INTERNAL MEDICINE

## 2023-05-15 PROCEDURE — 85025 COMPLETE CBC W/AUTO DIFF WBC: CPT | Performed by: INTERNAL MEDICINE

## 2023-05-15 PROCEDURE — 82306 VITAMIN D 25 HYDROXY: CPT | Performed by: INTERNAL MEDICINE

## 2023-05-15 PROCEDURE — 86140 C-REACTIVE PROTEIN: CPT | Performed by: INTERNAL MEDICINE

## 2023-05-15 PROCEDURE — 84466 ASSAY OF TRANSFERRIN: CPT | Performed by: INTERNAL MEDICINE

## 2023-05-15 PROCEDURE — 83540 ASSAY OF IRON: CPT | Performed by: INTERNAL MEDICINE

## 2023-05-15 PROCEDURE — 86038 ANTINUCLEAR ANTIBODIES: CPT | Performed by: INTERNAL MEDICINE

## 2023-05-15 PROCEDURE — 84480 ASSAY TRIIODOTHYRONINE (T3): CPT | Performed by: INTERNAL MEDICINE

## 2023-05-16 LAB
ANA PATTERN 1: NORMAL
ANA SER QL IF: POSITIVE
ANA TITR SER IF: NORMAL {TITER}

## 2023-05-18 LAB
ANTI SM ANTIBODY: 0.09 RATIO (ref 0–0.99)
ANTI SM/RNP ANTIBODY: 0.09 RATIO (ref 0–0.99)
ANTI-SM INTERPRETATION: NEGATIVE
ANTI-SM/RNP INTERPRETATION: NEGATIVE
ANTI-SSA ANTIBODY: 0.09 RATIO (ref 0–0.99)
ANTI-SSA INTERPRETATION: NEGATIVE
ANTI-SSB ANTIBODY: 0.06 RATIO (ref 0–0.99)
ANTI-SSB INTERPRETATION: NEGATIVE
DSDNA AB SER-ACNC: NORMAL [IU]/ML

## 2023-05-22 ENCOUNTER — OFFICE VISIT (OUTPATIENT)
Dept: RHEUMATOLOGY | Facility: CLINIC | Age: 63
End: 2023-05-22
Payer: MEDICAID

## 2023-05-22 VITALS
SYSTOLIC BLOOD PRESSURE: 147 MMHG | BODY MASS INDEX: 45.82 KG/M2 | WEIGHT: 249 LBS | HEIGHT: 62 IN | HEART RATE: 78 BPM | DIASTOLIC BLOOD PRESSURE: 76 MMHG

## 2023-05-22 DIAGNOSIS — M35.00 SJOGREN'S SYNDROME, WITH UNSPECIFIED ORGAN INVOLVEMENT: ICD-10-CM

## 2023-05-22 DIAGNOSIS — E55.9 VITAMIN D INSUFFICIENCY: ICD-10-CM

## 2023-05-22 DIAGNOSIS — D64.9 ANEMIA, UNSPECIFIED TYPE: ICD-10-CM

## 2023-05-22 DIAGNOSIS — Z79.899 HIGH RISK MEDICATION USE: ICD-10-CM

## 2023-05-22 DIAGNOSIS — M06.00 SERONEGATIVE RHEUMATOID ARTHRITIS: Primary | ICD-10-CM

## 2023-05-22 PROCEDURE — 99213 PR OFFICE/OUTPT VISIT, EST, LEVL III, 20-29 MIN: ICD-10-PCS | Mod: S$PBB,,, | Performed by: PHYSICIAN ASSISTANT

## 2023-05-22 PROCEDURE — 3077F SYST BP >= 140 MM HG: CPT | Mod: CPTII,,, | Performed by: PHYSICIAN ASSISTANT

## 2023-05-22 PROCEDURE — 1160F PR REVIEW ALL MEDS BY PRESCRIBER/CLIN PHARMACIST DOCUMENTED: ICD-10-PCS | Mod: CPTII,,, | Performed by: PHYSICIAN ASSISTANT

## 2023-05-22 PROCEDURE — 3008F BODY MASS INDEX DOCD: CPT | Mod: CPTII,,, | Performed by: PHYSICIAN ASSISTANT

## 2023-05-22 PROCEDURE — 1160F RVW MEDS BY RX/DR IN RCRD: CPT | Mod: CPTII,,, | Performed by: PHYSICIAN ASSISTANT

## 2023-05-22 PROCEDURE — 3051F HG A1C>EQUAL 7.0%<8.0%: CPT | Mod: CPTII,,, | Performed by: PHYSICIAN ASSISTANT

## 2023-05-22 PROCEDURE — 1159F MED LIST DOCD IN RCRD: CPT | Mod: CPTII,,, | Performed by: PHYSICIAN ASSISTANT

## 2023-05-22 PROCEDURE — 3078F DIAST BP <80 MM HG: CPT | Mod: CPTII,,, | Performed by: PHYSICIAN ASSISTANT

## 2023-05-22 PROCEDURE — 99214 OFFICE O/P EST MOD 30 MIN: CPT | Mod: PBBFAC,PN | Performed by: PHYSICIAN ASSISTANT

## 2023-05-22 PROCEDURE — 4010F ACE/ARB THERAPY RXD/TAKEN: CPT | Mod: CPTII,,, | Performed by: PHYSICIAN ASSISTANT

## 2023-05-22 PROCEDURE — 99213 OFFICE O/P EST LOW 20 MIN: CPT | Mod: S$PBB,,, | Performed by: PHYSICIAN ASSISTANT

## 2023-05-22 PROCEDURE — 1159F PR MEDICATION LIST DOCUMENTED IN MEDICAL RECORD: ICD-10-PCS | Mod: CPTII,,, | Performed by: PHYSICIAN ASSISTANT

## 2023-05-22 PROCEDURE — 99999 PR PBB SHADOW E&M-EST. PATIENT-LVL IV: CPT | Mod: PBBFAC,,, | Performed by: PHYSICIAN ASSISTANT

## 2023-05-22 PROCEDURE — 4010F PR ACE/ARB THEARPY RXD/TAKEN: ICD-10-PCS | Mod: CPTII,,, | Performed by: PHYSICIAN ASSISTANT

## 2023-05-22 PROCEDURE — 3077F PR MOST RECENT SYSTOLIC BLOOD PRESSURE >= 140 MM HG: ICD-10-PCS | Mod: CPTII,,, | Performed by: PHYSICIAN ASSISTANT

## 2023-05-22 PROCEDURE — 3051F PR MOST RECENT HEMOGLOBIN A1C LEVEL 7.0 - < 8.0%: ICD-10-PCS | Mod: CPTII,,, | Performed by: PHYSICIAN ASSISTANT

## 2023-05-22 PROCEDURE — 3078F PR MOST RECENT DIASTOLIC BLOOD PRESSURE < 80 MM HG: ICD-10-PCS | Mod: CPTII,,, | Performed by: PHYSICIAN ASSISTANT

## 2023-05-22 PROCEDURE — 99999 PR PBB SHADOW E&M-EST. PATIENT-LVL IV: ICD-10-PCS | Mod: PBBFAC,,, | Performed by: PHYSICIAN ASSISTANT

## 2023-05-22 PROCEDURE — 3008F PR BODY MASS INDEX (BMI) DOCUMENTED: ICD-10-PCS | Mod: CPTII,,, | Performed by: PHYSICIAN ASSISTANT

## 2023-05-22 ASSESSMENT — ROUTINE ASSESSMENT OF PATIENT INDEX DATA (RAPID3)
FATIGUE SCORE: 1.1
TOTAL RAPID3 SCORE: 4.78
MDHAQ FUNCTION SCORE: 1.6
PAIN SCORE: 4.5
PSYCHOLOGICAL DISTRESS SCORE: 0
PATIENT GLOBAL ASSESSMENT SCORE: 4.5

## 2023-05-22 NOTE — PROGRESS NOTES
Subjective:       Patient ID: Patricia Fournier is a 62 y.o. female.    Chief Complaint: Disease Management        Mrs. Fournier is a 62 year old female who presents to clinic for follow up on seronegative RA, osteoarthritis, sjogren's syndrome. She is doing fair overall. She reports increased pain in her her wrists and ankles, which is new.  She is taking plaquenil 200 mg twice daily and prednisone 2.5-5 mg on rare occasion for generalized flares. She complains of increased low back pain with radiation to the groin bilaterally. She has known osteoarthritis of L4-5 and L5-S1. No radiacular pain. No lower extremity weakness. She has occasional difficulty moving from seated to standing due to knee pain, but overall she is able to function without much limitation. She is having more difficulty with bending and lifting. She has not tried PT in the past, but is not interested right now. She plans to est care with new PCP in 2 weeks and wants to discuss fatigue/VIVEROS prior to pursuing physical therapy.    Ultracet prescribed at last visit, but she would like to avoid any controlled substances for pain control.     We reviewed her recent labs.     Current tx:  1.  mg bid       Review of Systems   Constitutional:  Positive for fatigue. Negative for activity change, appetite change, chills and fever.   Eyes:  Negative for visual disturbance.   Respiratory:  Negative for cough.    Cardiovascular:  Negative for chest pain.   Gastrointestinal:  Negative for abdominal pain, constipation, diarrhea, nausea and vomiting.   Musculoskeletal:  Positive for arthralgias, back pain, joint swelling and neck pain.   Neurological:  Negative for dizziness, weakness, light-headedness and headaches.       Objective:     Vitals:    05/22/23 1039   BP: (!) 147/76   Pulse: 78       Past Medical History:   Diagnosis Date    Diabetes mellitus, type 2     GERD (gastroesophageal reflux disease)     Hypertension      Past Surgical History:   Procedure  Laterality Date     SECTION      gallbladder removed      TUBAL LIGATION            Physical Exam   Constitutional: She is oriented to person, place, and time.   Eyes: Right conjunctiva is not injected. Left conjunctiva is not injected.   Neck: No JVD present. No thyromegaly present.   Cardiovascular: Normal rate. Exam reveals no decreased pulses.   Trace bilateral lower ext edema   Pulmonary/Chest: Effort normal.   Musculoskeletal:         General: No swelling.      Right shoulder: Normal.      Left shoulder: Normal.      Right elbow: Normal.      Left elbow: Normal.      Right wrist: Normal.      Left wrist: Swelling present.      Right knee: Normal.      Left knee: Normal.   Lymphadenopathy:     She has no cervical adenopathy.   Neurological: She is alert and oriented to person, place, and time. Gait normal.   Skin: No rash noted.   Psychiatric: Mood and affect normal.       Right Side Rheumatological Exam     Examination finds the shoulder, elbow, wrist, knee, 1st PIP, 1st MCP, 2nd PIP, 2nd MCP, 3rd PIP, 3rd MCP, 4th PIP, 4th MCP, 5th PIP and 5th MCP normal.    Left Side Rheumatological Exam     Examination finds the shoulder, elbow, knee, 1st PIP, 1st MCP, 2nd PIP, 2nd MCP, 3rd PIP, 3rd MCP, 4th PIP, 4th MCP, 5th PIP and 5th MCP normal.    She has swelling of the wrist      Back/Neck Exam   Tenderness Right paramedian tenderness of the Lower L-Spine and SI Joint.Left paramedian tenderness of the Lower L-Spine and SI Joint.      Recent labs reviewed:  Component      Latest Ref Rng & Units 5/15/2023   WBC      3.90 - 12.70 K/uL 4.76   RBC      4.00 - 5.40 M/uL 3.69 (L)   Hemoglobin      12.0 - 16.0 g/dL 10.6 (L)   Hematocrit      37.0 - 48.5 % 33.8 (L)   MCV      82 - 98 fL 92   MCH      27.0 - 31.0 pg 28.7   MCHC      32.0 - 36.0 g/dL 31.4 (L)   RDW      11.5 - 14.5 % 13.5   Platelets      150 - 450 K/uL 219   MPV      9.2 - 12.9 fL 10.7   Immature Granulocytes      0.0 - 0.5 % 0.4   Gran # (ANC)       1.8 - 7.7 K/uL 2.5   Immature Grans (Abs)      0.00 - 0.04 K/uL 0.02   Lymph #      1.0 - 4.8 K/uL 1.5   Mono #      0.3 - 1.0 K/uL 0.5   Eos #      0.0 - 0.5 K/uL 0.3   Baso #      0.00 - 0.20 K/uL 0.02   nRBC      0 /100 WBC 0   Gran %      38.0 - 73.0 % 51.7   Lymph %      18.0 - 48.0 % 30.5   Mono %      4.0 - 15.0 % 10.9   Eosinophil %      0.0 - 8.0 % 6.1   Basophil %      0.0 - 1.9 % 0.4   Differential Method       Automated   Sodium      136 - 145 mmol/L 138   Potassium      3.5 - 5.1 mmol/L 5.3 (H)   Chloride      95 - 110 mmol/L 105   CO2      23 - 29 mmol/L 25   Glucose      70 - 110 mg/dL 168 (H)   BUN      8 - 23 mg/dL 29 (H)   Creatinine      0.5 - 1.4 mg/dL 1.1   Calcium      8.7 - 10.5 mg/dL 9.3   PROTEIN TOTAL      6.0 - 8.4 g/dL 6.9   Albumin      3.5 - 5.2 g/dL 3.7   BILIRUBIN TOTAL      0.1 - 1.0 mg/dL 0.2   Alkaline Phosphatase      55 - 135 U/L 53 (L)   AST      10 - 40 U/L 16   ALT      10 - 44 U/L 15   Anion Gap      8 - 16 mmol/L 8   eGFR      >60 mL/min/1.73 m:2 56.1 (A)   Anti Sm Antibody      0.00 - 0.99 Ratio 0.09   Anti-Sm Interpretation      Negative Negative   Anti-SSA Antibody      0.00 - 0.99 Ratio 0.09   Anti-SSA Interpretation      Negative Negative   Anti-SSB Antibody      0.00 - 0.99 Ratio 0.06   Anti-SSB Interpretation      Negative Negative   ds DNA Ab      Negative 1:10 Negative 1:10   Anti Sm/RNP Antibody      0.00 - 0.99 Ratio 0.09   Anti-Sm/RNP Interpretation      Negative Negative   Iron      30 - 160 ug/dL 83   Transferrin      200 - 375 mg/dL 292   TIBC      250 - 450 ug/dL 432   Saturated Iron      20 - 50 % 19 (L)   CRP      0.0 - 8.2 mg/L 0.9   GRACIELA Screen      Negative <1:80 Positive (A)   Sed Rate      0 - 36 mm/Hr 14   TSH      0.400 - 4.000 uIU/mL 2.883   Free T4      0.71 - 1.51 ng/dL 0.79   T3, Total      60 - 180 ng/dL 79   Vit D, 25-Hydroxy      30 - 96 ng/mL 25 (L)   Vitamin B-12      210 - 950 pg/mL >2000 (H)   GRACIELA PATTERN 1       Homogeneous   GRACIELA Titer 1        1:160       Assessment:       1. Seronegative rheumatoid arthritis    2. Sjogren's syndrome, with unspecified organ involvement    3. High risk medication use    4. Vitamin D insufficiency    5. Anemia, unspecified type                Plan:       Seronegative rheumatoid arthritis  -     CBC Auto Differential; Future; Expected date: 05/22/2023  -     Comprehensive Metabolic Panel; Future; Expected date: 05/22/2023  -     C-Reactive Protein; Future; Expected date: 05/22/2023  -     Sedimentation rate; Future; Expected date: 05/22/2023  -     predniSONE (DELTASONE) 2.5 MG tablet; Take 2 tablets (5 mg total) by mouth daily as needed (severe flare in the am).  Dispense: 60 tablet; Refill: 1  -     hydrOXYchloroQUINE (PLAQUENIL) 200 mg tablet; Take 1 tablet (200 mg total) by mouth 2 (two) times daily.  Dispense: 60 tablet; Refill: 6    Sjogren's syndrome, with unspecified organ involvement  -     predniSONE (DELTASONE) 2.5 MG tablet; Take 2 tablets (5 mg total) by mouth daily as needed (severe flare in the am).  Dispense: 60 tablet; Refill: 1  -     hydrOXYchloroQUINE (PLAQUENIL) 200 mg tablet; Take 1 tablet (200 mg total) by mouth 2 (two) times daily.  Dispense: 60 tablet; Refill: 6    High risk medication use    Vitamin D insufficiency    Anemia, unspecified type          Assessment:  62 year old female with  Seronegative RA, Sjogren's syndrome, GRACIELA 1:160 homogenous on plaquenil  --osteoarthritis cervical and lumbar spine  --CKD stage 3  --uncontrolled type 2 diabetes with hyperglycemia, last HgbA1c 7.3%  --pulmonary nodules, restrictive lung disease followed by Pulmonary  --hx of lap band  --normocytic anemia    Plan:  1. Cont Plaquenil 200 mg bid  2. Cont robaxin bid PRN, prednisone PRN  3. Add vitamin D 1000 IU daily  4. Consider PT for lumbar spine pain  5. Needs f/u with primary care for worsening anemia    Avoid nsaids due to CKD      Follow up:  4 mo w/labs prior

## 2023-05-23 RX ORDER — PREDNISONE 2.5 MG/1
5 TABLET ORAL DAILY PRN
Qty: 60 TABLET | Refills: 1 | Status: SHIPPED | OUTPATIENT
Start: 2023-05-23 | End: 2024-01-24 | Stop reason: ALTCHOICE

## 2023-05-23 RX ORDER — HYDROXYCHLOROQUINE SULFATE 200 MG/1
200 TABLET, FILM COATED ORAL 2 TIMES DAILY
Qty: 60 TABLET | Refills: 6 | Status: SHIPPED | OUTPATIENT
Start: 2023-05-23 | End: 2023-09-26 | Stop reason: SDUPTHER

## 2023-06-05 PROBLEM — Z00.00 PREVENTATIVE HEALTH CARE: Status: ACTIVE | Noted: 2023-06-05

## 2023-06-05 PROBLEM — D35.02 ADENOMA OF LEFT ADRENAL GLAND: Status: RESOLVED | Noted: 2020-05-07 | Resolved: 2023-06-05

## 2023-06-05 PROBLEM — Z80.0 FAMILY HISTORY OF COLON CANCER: Status: ACTIVE | Noted: 2023-06-05

## 2023-06-05 PROBLEM — E78.00 HYPERCHOLESTEROLEMIA: Status: ACTIVE | Noted: 2023-06-05

## 2023-06-05 PROBLEM — N18.30 CKD (CHRONIC KIDNEY DISEASE) STAGE 3, GFR 30-59 ML/MIN: Status: RESOLVED | Noted: 2020-05-07 | Resolved: 2023-06-05

## 2023-06-05 PROBLEM — R91.8 PULMONARY NODULES: Status: ACTIVE | Noted: 2023-06-05

## 2023-06-05 PROBLEM — I10 ESSENTIAL (PRIMARY) HYPERTENSION: Status: ACTIVE | Noted: 2023-06-05

## 2023-06-05 PROBLEM — Z98.890 HISTORY OF ARTHROSCOPY OF RIGHT SHOULDER: Status: ACTIVE | Noted: 2023-06-05

## 2023-06-05 PROBLEM — D35.02 ADRENAL ADENOMA, LEFT: Status: ACTIVE | Noted: 2023-06-05

## 2023-06-05 PROBLEM — I10 ESSENTIAL HYPERTENSION: Status: RESOLVED | Noted: 2020-05-07 | Resolved: 2023-06-05

## 2023-06-05 PROBLEM — R53.82 CHRONIC FATIGUE, UNSPECIFIED: Status: ACTIVE | Noted: 2023-06-05

## 2023-06-05 PROBLEM — Z86.010 PERSONAL HISTORY OF COLONIC POLYPS: Status: ACTIVE | Noted: 2023-06-05

## 2023-06-05 PROBLEM — M76.829 POSTERIOR TIBIAL TENDON DYSFUNCTION: Status: RESOLVED | Noted: 2017-02-20 | Resolved: 2023-06-05

## 2023-06-05 PROBLEM — F41.1 GENERALIZED ANXIETY DISORDER: Status: ACTIVE | Noted: 2023-06-05

## 2023-06-05 PROBLEM — Z79.4 TYPE 2 DIABETES MELLITUS WITH HYPERGLYCEMIA, WITH LONG-TERM CURRENT USE OF INSULIN: Status: ACTIVE | Noted: 2023-06-05

## 2023-06-05 PROBLEM — J30.9 ALLERGIC SINUSITIS: Status: ACTIVE | Noted: 2023-06-05

## 2023-06-05 PROBLEM — Z98.84 HISTORY OF BARIATRIC SURGERY: Status: ACTIVE | Noted: 2023-06-05

## 2023-06-05 PROBLEM — K21.9 GERD (GASTROESOPHAGEAL REFLUX DISEASE): Status: ACTIVE | Noted: 2023-06-05

## 2023-06-05 PROBLEM — Z86.0100 PERSONAL HISTORY OF COLONIC POLYPS: Status: ACTIVE | Noted: 2023-06-05

## 2023-06-05 PROBLEM — Z90.49 HISTORY OF CHOLECYSTECTOMY: Status: ACTIVE | Noted: 2023-06-05

## 2023-06-05 PROBLEM — N18.30 STAGE III CHRONIC KIDNEY DISEASE: Status: ACTIVE | Noted: 2023-06-05

## 2023-06-05 PROBLEM — E11.65 TYPE 2 DIABETES MELLITUS WITH HYPERGLYCEMIA, WITH LONG-TERM CURRENT USE OF INSULIN: Status: ACTIVE | Noted: 2023-06-05

## 2023-06-05 PROBLEM — M06.9 RHEUMATOID ARTHRITIS INVOLVING MULTIPLE JOINTS: Status: ACTIVE | Noted: 2023-06-05

## 2023-06-05 PROBLEM — G47.33 OSA (OBSTRUCTIVE SLEEP APNEA): Status: ACTIVE | Noted: 2023-06-05

## 2023-06-05 PROBLEM — D64.9 NORMOCYTIC ANEMIA: Status: ACTIVE | Noted: 2023-06-05

## 2023-06-05 PROBLEM — E11.29 TYPE 2 DIABETES MELLITUS WITH KIDNEY COMPLICATION, WITH LONG-TERM CURRENT USE OF INSULIN: Status: ACTIVE | Noted: 2023-06-05

## 2023-06-05 PROBLEM — Z79.4 TYPE 2 DIABETES MELLITUS WITH KIDNEY COMPLICATION, WITH LONG-TERM CURRENT USE OF INSULIN: Status: ACTIVE | Noted: 2023-06-05

## 2023-06-05 PROBLEM — Z00.00 PREVENTATIVE HEALTH CARE: Status: RESOLVED | Noted: 2023-06-05 | Resolved: 2023-06-05

## 2023-06-05 PROBLEM — I83.93 VARICOSE VEINS OF LEGS: Status: ACTIVE | Noted: 2023-06-05

## 2023-06-19 PROBLEM — Z13.6 ENCOUNTER FOR SCREENING FOR CARDIOVASCULAR DISORDERS: Status: ACTIVE | Noted: 2023-06-19

## 2023-07-03 PROBLEM — R93.1 ABNORMAL SCREENING CARDIAC CT: Status: ACTIVE | Noted: 2023-07-03

## 2023-07-07 ENCOUNTER — LAB VISIT (OUTPATIENT)
Dept: LAB | Facility: HOSPITAL | Age: 63
End: 2023-07-07
Attending: INTERNAL MEDICINE
Payer: MEDICARE

## 2023-07-07 DIAGNOSIS — D64.9 NORMOCYTIC ANEMIA: ICD-10-CM

## 2023-07-07 DIAGNOSIS — Z11.59 NEED FOR HEPATITIS C SCREENING TEST: ICD-10-CM

## 2023-07-07 DIAGNOSIS — Z79.4 TYPE 2 DIABETES MELLITUS WITH HYPERGLYCEMIA, WITH LONG-TERM CURRENT USE OF INSULIN: ICD-10-CM

## 2023-07-07 DIAGNOSIS — E11.65 TYPE 2 DIABETES MELLITUS WITH HYPERGLYCEMIA, WITH LONG-TERM CURRENT USE OF INSULIN: ICD-10-CM

## 2023-07-07 DIAGNOSIS — E07.9 THYROID DISORDER: ICD-10-CM

## 2023-07-07 DIAGNOSIS — Z51.81 THERAPEUTIC DRUG MONITORING: ICD-10-CM

## 2023-07-07 DIAGNOSIS — E78.00 HYPERCHOLESTEROLEMIA: ICD-10-CM

## 2023-07-07 DIAGNOSIS — E53.8 VITAMIN B12 DEFICIENCY: ICD-10-CM

## 2023-07-07 DIAGNOSIS — E55.9 VITAMIN D DEFICIENCY: ICD-10-CM

## 2023-07-07 LAB
25(OH)D3+25(OH)D2 SERPL-MCNC: 25 NG/ML (ref 30–96)
ALBUMIN SERPL BCP-MCNC: 3.8 G/DL (ref 3.5–5.2)
ALP SERPL-CCNC: 53 U/L (ref 55–135)
ALT SERPL W/O P-5'-P-CCNC: 19 U/L (ref 10–44)
ANION GAP SERPL CALC-SCNC: 7 MMOL/L (ref 8–16)
AST SERPL-CCNC: 16 U/L (ref 10–40)
BASOPHILS # BLD AUTO: 0.03 K/UL (ref 0–0.2)
BASOPHILS NFR BLD: 0.6 % (ref 0–1.9)
BILIRUB SERPL-MCNC: 0.3 MG/DL (ref 0.1–1)
BUN SERPL-MCNC: 34 MG/DL (ref 8–23)
CALCIUM SERPL-MCNC: 9.3 MG/DL (ref 8.7–10.5)
CHLORIDE SERPL-SCNC: 111 MMOL/L (ref 95–110)
CHOLEST SERPL-MCNC: 127 MG/DL (ref 120–199)
CHOLEST/HDLC SERPL: 2.2 {RATIO} (ref 2–5)
CO2 SERPL-SCNC: 21 MMOL/L (ref 23–29)
CREAT SERPL-MCNC: 1.3 MG/DL (ref 0.5–1.4)
DIFFERENTIAL METHOD: ABNORMAL
EOSINOPHIL # BLD AUTO: 0.3 K/UL (ref 0–0.5)
EOSINOPHIL NFR BLD: 6 % (ref 0–8)
ERYTHROCYTE [DISTWIDTH] IN BLOOD BY AUTOMATED COUNT: 13 % (ref 11.5–14.5)
EST. GFR  (NO RACE VARIABLE): 45.6 ML/MIN/1.73 M^2
ESTIMATED AVG GLUCOSE: 186 MG/DL (ref 68–131)
GLUCOSE SERPL-MCNC: 118 MG/DL (ref 70–110)
HBA1C MFR BLD: 8.1 % (ref 4–5.6)
HCT VFR BLD AUTO: 36.4 % (ref 37–48.5)
HCV AB SERPL QL IA: NORMAL
HDLC SERPL-MCNC: 59 MG/DL (ref 40–75)
HDLC SERPL: 46.5 % (ref 20–50)
HGB BLD-MCNC: 11.6 G/DL (ref 12–16)
IMM GRANULOCYTES # BLD AUTO: 0.01 K/UL (ref 0–0.04)
IMM GRANULOCYTES NFR BLD AUTO: 0.2 % (ref 0–0.5)
LDLC SERPL CALC-MCNC: 50.4 MG/DL (ref 63–159)
LYMPHOCYTES # BLD AUTO: 1.6 K/UL (ref 1–4.8)
LYMPHOCYTES NFR BLD: 30 % (ref 18–48)
MCH RBC QN AUTO: 30.1 PG (ref 27–31)
MCHC RBC AUTO-ENTMCNC: 31.9 G/DL (ref 32–36)
MCV RBC AUTO: 94 FL (ref 82–98)
MONOCYTES # BLD AUTO: 0.5 K/UL (ref 0.3–1)
MONOCYTES NFR BLD: 9.7 % (ref 4–15)
NEUTROPHILS # BLD AUTO: 2.9 K/UL (ref 1.8–7.7)
NEUTROPHILS NFR BLD: 53.5 % (ref 38–73)
NONHDLC SERPL-MCNC: 68 MG/DL
NRBC BLD-RTO: 0 /100 WBC
PLATELET # BLD AUTO: 216 K/UL (ref 150–450)
PMV BLD AUTO: 11.3 FL (ref 9.2–12.9)
POTASSIUM SERPL-SCNC: 4.8 MMOL/L (ref 3.5–5.1)
PROT SERPL-MCNC: 6.9 G/DL (ref 6–8.4)
RBC # BLD AUTO: 3.86 M/UL (ref 4–5.4)
SODIUM SERPL-SCNC: 139 MMOL/L (ref 136–145)
T4 FREE SERPL-MCNC: 0.74 NG/DL (ref 0.71–1.51)
TRIGL SERPL-MCNC: 88 MG/DL (ref 30–150)
TSH SERPL DL<=0.005 MIU/L-ACNC: 2.53 UIU/ML (ref 0.4–4)
VIT B12 SERPL-MCNC: 1610 PG/ML (ref 210–950)
WBC # BLD AUTO: 5.37 K/UL (ref 3.9–12.7)

## 2023-07-07 PROCEDURE — 80061 LIPID PANEL: CPT | Performed by: INTERNAL MEDICINE

## 2023-07-07 PROCEDURE — 86803 HEPATITIS C AB TEST: CPT | Performed by: INTERNAL MEDICINE

## 2023-07-07 PROCEDURE — 82306 VITAMIN D 25 HYDROXY: CPT | Performed by: INTERNAL MEDICINE

## 2023-07-07 PROCEDURE — 82607 VITAMIN B-12: CPT | Performed by: INTERNAL MEDICINE

## 2023-07-07 PROCEDURE — 80053 COMPREHEN METABOLIC PANEL: CPT | Performed by: INTERNAL MEDICINE

## 2023-07-07 PROCEDURE — 84443 ASSAY THYROID STIM HORMONE: CPT | Performed by: INTERNAL MEDICINE

## 2023-07-07 PROCEDURE — 83036 HEMOGLOBIN GLYCOSYLATED A1C: CPT | Performed by: INTERNAL MEDICINE

## 2023-07-07 PROCEDURE — 85025 COMPLETE CBC W/AUTO DIFF WBC: CPT | Performed by: INTERNAL MEDICINE

## 2023-07-07 PROCEDURE — 84439 ASSAY OF FREE THYROXINE: CPT | Performed by: INTERNAL MEDICINE

## 2023-07-07 PROCEDURE — 36415 COLL VENOUS BLD VENIPUNCTURE: CPT | Mod: PO | Performed by: INTERNAL MEDICINE

## 2023-08-23 ENCOUNTER — OFFICE VISIT (OUTPATIENT)
Dept: CARDIOLOGY | Facility: CLINIC | Age: 63
End: 2023-08-23
Payer: MEDICARE

## 2023-08-23 VITALS
SYSTOLIC BLOOD PRESSURE: 132 MMHG | BODY MASS INDEX: 45.84 KG/M2 | HEIGHT: 62 IN | DIASTOLIC BLOOD PRESSURE: 74 MMHG | WEIGHT: 249.13 LBS | HEART RATE: 72 BPM

## 2023-08-23 DIAGNOSIS — D35.02 ADRENAL ADENOMA, LEFT: ICD-10-CM

## 2023-08-23 DIAGNOSIS — Z98.84 HISTORY OF BARIATRIC SURGERY: ICD-10-CM

## 2023-08-23 DIAGNOSIS — E66.01 CLASS 3 SEVERE OBESITY DUE TO EXCESS CALORIES WITH SERIOUS COMORBIDITY AND BODY MASS INDEX (BMI) OF 40.0 TO 44.9 IN ADULT: ICD-10-CM

## 2023-08-23 DIAGNOSIS — I77.1 STRICTURE OF ARTERY: ICD-10-CM

## 2023-08-23 DIAGNOSIS — I83.93 VARICOSE VEINS OF BOTH LOWER EXTREMITIES, UNSPECIFIED WHETHER COMPLICATED: ICD-10-CM

## 2023-08-23 DIAGNOSIS — E11.65 TYPE 2 DIABETES MELLITUS WITH HYPERGLYCEMIA, WITH LONG-TERM CURRENT USE OF INSULIN: ICD-10-CM

## 2023-08-23 DIAGNOSIS — G47.33 OSA (OBSTRUCTIVE SLEEP APNEA): ICD-10-CM

## 2023-08-23 DIAGNOSIS — I10 ESSENTIAL (PRIMARY) HYPERTENSION: ICD-10-CM

## 2023-08-23 DIAGNOSIS — R93.1 ABNORMAL SCREENING CARDIAC CT: ICD-10-CM

## 2023-08-23 DIAGNOSIS — Z79.4 TYPE 2 DIABETES MELLITUS WITH HYPERGLYCEMIA, WITH LONG-TERM CURRENT USE OF INSULIN: ICD-10-CM

## 2023-08-23 DIAGNOSIS — N18.32 STAGE 3B CHRONIC KIDNEY DISEASE: ICD-10-CM

## 2023-08-23 DIAGNOSIS — Z13.6 ENCOUNTER FOR SCREENING FOR CARDIOVASCULAR DISORDERS: ICD-10-CM

## 2023-08-23 DIAGNOSIS — E78.00 HYPERCHOLESTEROLEMIA: ICD-10-CM

## 2023-08-23 DIAGNOSIS — Z00.00 ANNUAL PHYSICAL EXAM: Primary | ICD-10-CM

## 2023-08-23 PROCEDURE — 3061F NEG MICROALBUMINURIA REV: CPT | Mod: CPTII,S$GLB,, | Performed by: INTERNAL MEDICINE

## 2023-08-23 PROCEDURE — 3288F PR FALLS RISK ASSESSMENT DOCUMENTED: ICD-10-PCS | Mod: CPTII,S$GLB,, | Performed by: INTERNAL MEDICINE

## 2023-08-23 PROCEDURE — 4010F PR ACE/ARB THEARPY RXD/TAKEN: ICD-10-PCS | Mod: CPTII,S$GLB,, | Performed by: INTERNAL MEDICINE

## 2023-08-23 PROCEDURE — 3075F SYST BP GE 130 - 139MM HG: CPT | Mod: CPTII,S$GLB,, | Performed by: INTERNAL MEDICINE

## 2023-08-23 PROCEDURE — 1101F PR PT FALLS ASSESS DOC 0-1 FALLS W/OUT INJ PAST YR: ICD-10-PCS | Mod: CPTII,S$GLB,, | Performed by: INTERNAL MEDICINE

## 2023-08-23 PROCEDURE — 1159F PR MEDICATION LIST DOCUMENTED IN MEDICAL RECORD: ICD-10-PCS | Mod: CPTII,S$GLB,, | Performed by: INTERNAL MEDICINE

## 2023-08-23 PROCEDURE — 93010 EKG 12-LEAD: ICD-10-PCS | Mod: S$GLB,,, | Performed by: INTERNAL MEDICINE

## 2023-08-23 PROCEDURE — 3061F PR NEG MICROALBUMINURIA RESULT DOCUMENTED/REVIEW: ICD-10-PCS | Mod: CPTII,S$GLB,, | Performed by: INTERNAL MEDICINE

## 2023-08-23 PROCEDURE — 93010 ELECTROCARDIOGRAM REPORT: CPT | Mod: S$GLB,,, | Performed by: INTERNAL MEDICINE

## 2023-08-23 PROCEDURE — 99204 PR OFFICE/OUTPT VISIT, NEW, LEVL IV, 45-59 MIN: ICD-10-PCS | Mod: S$GLB,,, | Performed by: INTERNAL MEDICINE

## 2023-08-23 PROCEDURE — 3288F FALL RISK ASSESSMENT DOCD: CPT | Mod: CPTII,S$GLB,, | Performed by: INTERNAL MEDICINE

## 2023-08-23 PROCEDURE — 1101F PT FALLS ASSESS-DOCD LE1/YR: CPT | Mod: CPTII,S$GLB,, | Performed by: INTERNAL MEDICINE

## 2023-08-23 PROCEDURE — 99999 PR PBB SHADOW E&M-EST. PATIENT-LVL IV: ICD-10-PCS | Mod: PBBFAC,,, | Performed by: INTERNAL MEDICINE

## 2023-08-23 PROCEDURE — 3008F PR BODY MASS INDEX (BMI) DOCUMENTED: ICD-10-PCS | Mod: CPTII,S$GLB,, | Performed by: INTERNAL MEDICINE

## 2023-08-23 PROCEDURE — 3052F HG A1C>EQUAL 8.0%<EQUAL 9.0%: CPT | Mod: CPTII,S$GLB,, | Performed by: INTERNAL MEDICINE

## 2023-08-23 PROCEDURE — 3066F PR DOCUMENTATION OF TREATMENT FOR NEPHROPATHY: ICD-10-PCS | Mod: CPTII,S$GLB,, | Performed by: INTERNAL MEDICINE

## 2023-08-23 PROCEDURE — 1159F MED LIST DOCD IN RCRD: CPT | Mod: CPTII,S$GLB,, | Performed by: INTERNAL MEDICINE

## 2023-08-23 PROCEDURE — 3066F NEPHROPATHY DOC TX: CPT | Mod: CPTII,S$GLB,, | Performed by: INTERNAL MEDICINE

## 2023-08-23 PROCEDURE — 3078F DIAST BP <80 MM HG: CPT | Mod: CPTII,S$GLB,, | Performed by: INTERNAL MEDICINE

## 2023-08-23 PROCEDURE — 99999 PR PBB SHADOW E&M-EST. PATIENT-LVL IV: CPT | Mod: PBBFAC,,, | Performed by: INTERNAL MEDICINE

## 2023-08-23 PROCEDURE — 3008F BODY MASS INDEX DOCD: CPT | Mod: CPTII,S$GLB,, | Performed by: INTERNAL MEDICINE

## 2023-08-23 PROCEDURE — 99204 OFFICE O/P NEW MOD 45 MIN: CPT | Mod: S$GLB,,, | Performed by: INTERNAL MEDICINE

## 2023-08-23 PROCEDURE — 3052F PR MOST RECENT HEMOGLOBIN A1C LEVEL 8.0 - < 9.0%: ICD-10-PCS | Mod: CPTII,S$GLB,, | Performed by: INTERNAL MEDICINE

## 2023-08-23 PROCEDURE — 3078F PR MOST RECENT DIASTOLIC BLOOD PRESSURE < 80 MM HG: ICD-10-PCS | Mod: CPTII,S$GLB,, | Performed by: INTERNAL MEDICINE

## 2023-08-23 PROCEDURE — 1160F PR REVIEW ALL MEDS BY PRESCRIBER/CLIN PHARMACIST DOCUMENTED: ICD-10-PCS | Mod: CPTII,S$GLB,, | Performed by: INTERNAL MEDICINE

## 2023-08-23 PROCEDURE — 4010F ACE/ARB THERAPY RXD/TAKEN: CPT | Mod: CPTII,S$GLB,, | Performed by: INTERNAL MEDICINE

## 2023-08-23 PROCEDURE — 1160F RVW MEDS BY RX/DR IN RCRD: CPT | Mod: CPTII,S$GLB,, | Performed by: INTERNAL MEDICINE

## 2023-08-23 PROCEDURE — 93005 ELECTROCARDIOGRAM TRACING: CPT | Mod: PO

## 2023-08-23 PROCEDURE — 3075F PR MOST RECENT SYSTOLIC BLOOD PRESS GE 130-139MM HG: ICD-10-PCS | Mod: CPTII,S$GLB,, | Performed by: INTERNAL MEDICINE

## 2023-08-23 RX ORDER — INSULIN GLARGINE-YFGN 100 [IU]/ML
INJECTION, SOLUTION SUBCUTANEOUS
COMMUNITY
Start: 2023-08-15 | End: 2023-08-30

## 2023-08-23 RX ORDER — OXYCODONE AND ACETAMINOPHEN 10; 325 MG/1; MG/1
TABLET ORAL
COMMUNITY
End: 2023-09-26

## 2023-08-23 NOTE — PROGRESS NOTES
"Subjective:    Patient ID:  Patricia Fournier is a 63 y.o. female patient here for evaluation Establish Care      History of Present Illness:  New patient cardiac evaluation.  Patient referred for abnormal calcium score of 184.  Risk factors hypertension, diabetes mellitus, dyslipidemia , family history.  Very remote past tobacco use.  Patient is symptomatic with VIEVROS.  No PND orthopnea.  No angina chest pain.  No arrhythmia.  No past documented history of ischemic structural arrhythmic heart disease.  Last noninvasive cardiac assessment number of years ago.             Review of patient's allergies indicates:   Allergen Reactions    Allspice Hives     Pt states allergic to pimento( which is part of [employer] family)    Pneumoc 20-aries conj-dip cr(pf) Rash     Swelling, cellulitis    Trulicity [dulaglutide] Other (See Comments)     Severe hypoglycemic and hospitalized       Past Medical History:   Diagnosis Date    a Elevated Cardiac CT Calcium Score 07/03/2023    7/3/23 Referred To Dr. Saji Alford; 6/5/23 RXd ASA 81 Mg Daily; 6/19/23 Cardiac CT Ca+ Score = 184.0 (See Report)    a Family H/O CAD     6/5/23 RXd ASA 81 Mg Daily; Her Mother    b Hypertension     b Stage 3 Chronic Kidney Disease     c Hypercholesterolemia     d Type 2 Diabetes Mellitus On Insulin     f Left Adrenal Adenoma     f Morbid Obesity     6/5/23 RXd Lifestyle Changes    g Chronic Normocytic Anemia     i Obstructive Sleep Apnea     6/5/23 referred To Dr. Camelia Scott; She Cannot Tolerate Her Current CPAP    i Pulmonary Subcentimeter Nodules     No Further Evaluation Indicated; 6/19/23 Chest CT Scan W/O Contrast = (See Report)    j Family H/O Colon Cancer     Dr. Markell Penny; Her Sister    j Gastroesophageal Reflux Disease     Dr. Markell Penny    j H/O Colon Polyps On 2019 Colonoscopy     Dr. Markell Penny; Her GI M.D.: "Repeat TC In 5 Years"    j H/O Lap Band Bariatric Surgery In 2005     This Was Purposely Deflated By Her Surgeon    j H/O " Laparoscopic Cholecystectomy     In The Remote Past    l H/O Right Shoulder Arthroscopic Surgery     l Rheumatoid Arthritis     Dr. Murali chacon Chronic Fatigue     n Generalized Anxiety And Stress     o Allergic Rhinosinusitis     q Bilateral Lower Extremity Varicose Veins     Wellness Visit 2023      Past Surgical History:   Procedure Laterality Date     SECTION      gallbladder removed      TUBAL LIGATION       Social History     Tobacco Use    Smoking status: Former     Current packs/day: 1.50     Average packs/day: 1.5 packs/day for 20.0 years (30.0 ttl pk-yrs)     Types: Cigarettes    Smokeless tobacco: Former     Quit date: 2002   Substance Use Topics    Alcohol use: No     Alcohol/week: 0.0 standard drinks of alcohol    Drug use: No        Review of Systems:    As noted in HPI in addition         REVIEW OF SYSTEMS  Review of Systems   Constitutional: Negative for decreased appetite, diaphoresis, night sweats, weight gain and weight loss.   HENT:  Negative for nosebleeds and odynophagia.    Eyes:  Negative for double vision and photophobia.   Cardiovascular:  Negative for chest pain, claudication, cyanosis, dyspnea on exertion, irregular heartbeat, leg swelling, near-syncope, orthopnea, palpitations, paroxysmal nocturnal dyspnea and syncope.   Respiratory:  Negative for cough, hemoptysis, shortness of breath and wheezing.    Hematologic/Lymphatic: Negative for adenopathy.   Skin:  Negative for flushing, skin cancer and suspicious lesions.   Musculoskeletal:  Negative for gout, myalgias and neck pain.   Gastrointestinal:  Negative for abdominal pain, heartburn, hematemesis and hematochezia.   Genitourinary:  Negative for bladder incontinence, hesitancy and nocturia.   Neurological:  Negative for focal weakness, headaches, light-headedness and paresthesias.   Psychiatric/Behavioral:  Negative for memory loss and substance abuse.        Objective:        Vitals:    23 0902   BP: 132/74  "  Pulse: 72       Lab Results   Component Value Date    WBC 5.37 07/07/2023    HGB 11.6 (L) 07/07/2023    HCT 36.4 (L) 07/07/2023     07/07/2023    CHOL 127 07/07/2023    TRIG 88 07/07/2023    HDL 59 07/07/2023    ALT 19 07/07/2023    AST 16 07/07/2023     07/07/2023    K 4.8 07/07/2023     (H) 07/07/2023    CREATININE 1.3 07/07/2023    BUN 34 (H) 07/07/2023    CO2 21 (L) 07/07/2023    TSH 2.531 07/07/2023    HGBA1C 8.1 (H) 07/07/2023      CARDIOGRAM RESULTS  No results found for this or any previous visit.        CURRENT/PREVIOUS VISIT EKG  No results found for this or any previous visit.  No valid procedures specified.   No results found for this or any previous visit.    No valid procedures specified.    PHYSICAL EXAM  CONSTITUTIONAL:  no apparent distress  NECK: no carotid bruit, no JVD  LUNGS: CTA  CHEST WALL: no tenderness,  HEART: regular rate and rhythm, S1, S2 normal, no murmur, click, rub or gallop   ABDOMEN: soft, non-tender; bowel sounds normal; no masses,  no organomegaly  EXTREMITIES: Extremities normal, no edema, no calf tenderness noted  VASCULAR EXAM: 2 PLUS UPPER AND LOWER EXT PULSES  NEURO: AAO X 3, NO ACUTE FOCAL OR LATERALIZING FINDINGS    I HAVE REVIEWED :    The vital signs, nurses notes, and all the pertinent radiology and labs.         Current Outpatient Medications   Medication Instructions    aspirin (ECOTRIN) 81 mg, Oral, Daily    BD ULTRA-FINE GEENA PEN NEEDLE 32 gauge x 5/32" Ndle Use 4x daily with insulin.    blood sugar diagnostic Strp To check BG 4 times daily, to use with insurance preferred meter--accuchek guide--Pt with fluctuating glucoses and freq insulin adjustments.    blood-glucose meter kit To check BG 3 times daily, to use with insurance preferred meter    cyanocobalamin, vitamin B-12, (VITAMIN B-12) 2,500 mcg Subl Sublingual    ferrous sulfate (FEOSOL) 325 mg, Oral, With breakfast    FLUoxetine 20 mg, Oral, Daily    fluticasone propionate (FLONASE) 50 " mcg/actuation nasal spray 2 sprays, Each Nostril, Daily    hydrOXYchloroQUINE (PLAQUENIL) 200 mg, Oral, 2 times daily    insulin (LANTUS SOLOSTAR U-100 INSULIN) glargine 100 units/mL SubQ pen INJECT 60 UNITS SUBCUTANEOUSLY ONCE DAILY    insulin glargine-yfgn 100 unit/mL (3 mL) InPn SMARTSI Unit(s) SUB-Q Daily    Lactobacillus acidophilus (PROBIOTIC ORAL) Oral    lancets Misc To check BG 3 times daily, to use with insurance preferred meter    lisinopriL (PRINIVIL,ZESTRIL) 20 mg, Oral, Daily    metFORMIN (GLUCOPHAGE-XR) 500 MG ER 24hr tablet Take 2 tablets by mouth twice daily    miscellaneous medical supply (C-TUB) Misc Diabetic shoes with full-length semi-rigid accommadative  Orthotics posting medial heel DX E11.9, [place]. Pes planus.    oxyCODONE-acetaminophen (PERCOCET)  mg per tablet No dose, route, or frequency recorded.    pantoprazole (PROTONIX) 40 mg, Oral    pravastatin (PRAVACHOL) 40 mg, Oral, Daily    predniSONE (DELTASONE) 5 mg, Oral, Daily PRN    PROCTO-MED HC 2.5 % rectal cream Rectal, 2 times daily    SITagliptin phosphate (JANUVIA) 100 MG Tab No dose, route, or frequency recorded.    TRULICITY 1.5 mg, Subcutaneous, Every 7 days          Assessment:   Calcium score 184.  Normal baseline EKG.  Multiple CV risk factors include family history, past tobacco use, diabetes mellitus, hypertension, dyslipidemia.  Dyspnea on exertion rule out angina equivalent.  Chronic kidney disease, GFR 45.6      Plan:   Echo, Lexiscan.  Follow up results          No follow-ups on file.        No heavy lifting/straining

## 2023-08-29 ENCOUNTER — CLINICAL SUPPORT (OUTPATIENT)
Dept: CARDIOLOGY | Facility: HOSPITAL | Age: 63
End: 2023-08-29
Attending: INTERNAL MEDICINE
Payer: MEDICARE

## 2023-08-29 VITALS
WEIGHT: 249 LBS | DIASTOLIC BLOOD PRESSURE: 74 MMHG | BODY MASS INDEX: 45.82 KG/M2 | SYSTOLIC BLOOD PRESSURE: 132 MMHG | HEIGHT: 62 IN

## 2023-08-29 DIAGNOSIS — Z13.6 ENCOUNTER FOR SCREENING FOR CARDIOVASCULAR DISORDERS: ICD-10-CM

## 2023-08-29 DIAGNOSIS — N18.32 STAGE 3B CHRONIC KIDNEY DISEASE: ICD-10-CM

## 2023-08-29 DIAGNOSIS — I83.93 VARICOSE VEINS OF BOTH LOWER EXTREMITIES, UNSPECIFIED WHETHER COMPLICATED: ICD-10-CM

## 2023-08-29 DIAGNOSIS — R93.1 ABNORMAL SCREENING CARDIAC CT: ICD-10-CM

## 2023-08-29 DIAGNOSIS — G47.33 OSA (OBSTRUCTIVE SLEEP APNEA): ICD-10-CM

## 2023-08-29 DIAGNOSIS — E78.00 HYPERCHOLESTEROLEMIA: ICD-10-CM

## 2023-08-29 DIAGNOSIS — I10 ESSENTIAL (PRIMARY) HYPERTENSION: ICD-10-CM

## 2023-08-29 DIAGNOSIS — Z00.00 ANNUAL PHYSICAL EXAM: ICD-10-CM

## 2023-08-29 DIAGNOSIS — I77.1 STRICTURE OF ARTERY: ICD-10-CM

## 2023-08-29 PROBLEM — E55.9 VITAMIN D DEFICIENCY, UNSPECIFIED: Status: ACTIVE | Noted: 2023-08-29

## 2023-08-29 LAB
ASCENDING AORTA: 2.7 CM
AV INDEX (PROSTH): 0.76
AV MEAN GRADIENT: 4 MMHG
AV PEAK GRADIENT: 8 MMHG
AV VALVE AREA BY VELOCITY RATIO: 2.33 CM²
AV VALVE AREA: 2.55 CM²
AV VELOCITY RATIO: 0.7
BSA FOR ECHO PROCEDURE: 2.22 M2
CV ECHO LV RWT: 0.53 CM
DOP CALC AO PEAK VEL: 1.37 M/S
DOP CALC AO VTI: 27.2 CM
DOP CALC LVOT AREA: 3.3 CM2
DOP CALC LVOT DIAMETER: 2.06 CM
DOP CALC LVOT PEAK VEL: 0.96 M/S
DOP CALC LVOT STROKE VOLUME: 69.29 CM3
DOP CALCLVOT PEAK VEL VTI: 20.8 CM
E WAVE DECELERATION TIME: 232.68 MSEC
E/A RATIO: 0.85
E/E' RATIO: 10.17 M/S
ECHO LV POSTERIOR WALL: 1.07 CM (ref 0.6–1.1)
FRACTIONAL SHORTENING: 26 % (ref 28–44)
INTERVENTRICULAR SEPTUM: 1.13 CM (ref 0.6–1.1)
IVRT: 108.47 MSEC
LA MAJOR: 5.32 CM
LA MINOR: 5.3 CM
LA WIDTH: 3.8 CM
LEFT ARM DIASTOLIC BLOOD PRESSURE: 74 MMHG
LEFT ARM SYSTOLIC BLOOD PRESSURE: 132 MMHG
LEFT ATRIUM SIZE: 3.42 CM
LEFT ATRIUM VOLUME INDEX: 27.9 ML/M2
LEFT ATRIUM VOLUME: 58.66 CM3
LEFT CBA DIAS: 17 CM/S
LEFT CBA SYS: 70 CM/S
LEFT CCA DIST DIAS: 16 CM/S
LEFT CCA DIST SYS: 86 CM/S
LEFT CCA MID DIAS: 18 CM/S
LEFT CCA MID SYS: 90 CM/S
LEFT CCA PROX DIAS: 20 CM/S
LEFT CCA PROX SYS: 98 CM/S
LEFT ECA DIAS: 9 CM/S
LEFT ECA SYS: 101 CM/S
LEFT ICA DIST DIAS: 26 CM/S
LEFT ICA DIST SYS: 70 CM/S
LEFT ICA MID DIAS: 21 CM/S
LEFT ICA MID SYS: 66 CM/S
LEFT ICA PROX DIAS: 19 CM/S
LEFT ICA PROX SYS: 74 CM/S
LEFT INTERNAL DIMENSION IN SYSTOLE: 3.02 CM (ref 2.1–4)
LEFT VENTRICLE DIASTOLIC VOLUME INDEX: 34.57 ML/M2
LEFT VENTRICLE DIASTOLIC VOLUME: 72.6 ML
LEFT VENTRICLE MASS INDEX: 71 G/M2
LEFT VENTRICLE SYSTOLIC VOLUME INDEX: 16.9 ML/M2
LEFT VENTRICLE SYSTOLIC VOLUME: 35.49 ML
LEFT VENTRICULAR INTERNAL DIMENSION IN DIASTOLE: 4.06 CM (ref 3.5–6)
LEFT VENTRICULAR MASS: 149.02 G
LEFT VERTEBRAL DIAS: 13 CM/S
LEFT VERTEBRAL SYS: 50 CM/S
LV LATERAL E/E' RATIO: 8.71 M/S
LV SEPTAL E/E' RATIO: 12.2 M/S
LVOT MG: 2.28 MMHG
LVOT MV: 0.73 CM/S
MV PEAK A VEL: 0.72 M/S
MV PEAK E VEL: 0.61 M/S
MV STENOSIS PRESSURE HALF TIME: 67.48 MS
MV VALVE AREA P 1/2 METHOD: 3.26 CM2
OHS CV CAROTID RIGHT ICA EDV HIGHEST: 26
OHS CV CAROTID ULTRASOUND LEFT ICA/CCA RATIO: 0.86
OHS CV CAROTID ULTRASOUND RIGHT ICA/CCA RATIO: 0.95
OHS CV PV CAROTID LEFT HIGHEST CCA: 98
OHS CV PV CAROTID LEFT HIGHEST ICA: 74
OHS CV PV CAROTID RIGHT HIGHEST CCA: 83
OHS CV PV CAROTID RIGHT HIGHEST ICA: 76
OHS CV US CAROTID LEFT HIGHEST EDV: 26
PISA TR MAX VEL: 2.14 M/S
PULM VEIN S/D RATIO: 1
PV PEAK D VEL: 0.5 M/S
PV PEAK S VEL: 0.5 M/S
RA MAJOR: 4.14 CM
RA PRESSURE ESTIMATED: 3 MMHG
RA WIDTH: 3.1 CM
RIGHT ARM DIASTOLIC BLOOD PRESSURE: 74 MMHG
RIGHT ARM SYSTOLIC BLOOD PRESSURE: 132 MMHG
RIGHT CBA DIAS: 10 CM/S
RIGHT CBA SYS: 56 CM/S
RIGHT CCA DIST DIAS: 17 CM/S
RIGHT CCA DIST SYS: 80 CM/S
RIGHT CCA MID DIAS: 13 CM/S
RIGHT CCA MID SYS: 83 CM/S
RIGHT CCA PROX DIAS: 18 CM/S
RIGHT CCA PROX SYS: 79 CM/S
RIGHT ECA DIAS: 11 CM/S
RIGHT ECA SYS: 124 CM/S
RIGHT ICA DIST DIAS: 26 CM/S
RIGHT ICA DIST SYS: 76 CM/S
RIGHT ICA MID DIAS: 21 CM/S
RIGHT ICA MID SYS: 64 CM/S
RIGHT ICA PROX DIAS: 18 CM/S
RIGHT ICA PROX SYS: 73 CM/S
RIGHT VENTRICULAR END-DIASTOLIC DIMENSION: 3.71 CM
RIGHT VENTRICULAR LENGTH IN DIASTOLE (APICAL 4-CHAMBER VIEW): 7.83 CM
RIGHT VERTEBRAL DIAS: 12 CM/S
RIGHT VERTEBRAL SYS: 49 CM/S
RV MID DIAMA: 2.16 CM
RV TB RVSP: 5 MMHG
RV TISSUE DOPPLER FREE WALL SYSTOLIC VELOCITY 1 (APICAL 4 CHAMBER VIEW): 16.21 CM/S
SINUS: 2.69 CM
STJ: 2.68 CM
TDI LATERAL: 0.07 M/S
TDI SEPTAL: 0.05 M/S
TDI: 0.06 M/S
TR MAX PG: 18 MMHG
TRICUSPID ANNULAR PLANE SYSTOLIC EXCURSION: 2.18 CM
TV REST PULMONARY ARTERY PRESSURE: 21 MMHG
Z-SCORE OF LEFT VENTRICULAR DIMENSION IN END DIASTOLE: -4.75
Z-SCORE OF LEFT VENTRICULAR DIMENSION IN END SYSTOLE: -2.2

## 2023-08-29 PROCEDURE — 93306 TTE W/DOPPLER COMPLETE: CPT | Mod: 26,,, | Performed by: INTERNAL MEDICINE

## 2023-08-29 PROCEDURE — 93880 CV US DOPPLER CAROTID (CUPID ONLY): ICD-10-PCS | Mod: 26,,, | Performed by: INTERNAL MEDICINE

## 2023-08-29 PROCEDURE — 93306 ECHO (CUPID ONLY): ICD-10-PCS | Mod: 26,,, | Performed by: INTERNAL MEDICINE

## 2023-08-29 PROCEDURE — 93880 EXTRACRANIAL BILAT STUDY: CPT | Mod: 26,,, | Performed by: INTERNAL MEDICINE

## 2023-08-29 PROCEDURE — 93880 EXTRACRANIAL BILAT STUDY: CPT | Mod: PO

## 2023-08-29 PROCEDURE — 93306 TTE W/DOPPLER COMPLETE: CPT | Mod: PO

## 2023-09-01 PROBLEM — Z13.6 ENCOUNTER FOR SCREENING FOR CARDIOVASCULAR DISORDERS: Status: RESOLVED | Noted: 2023-06-19 | Resolved: 2023-09-01

## 2023-09-19 ENCOUNTER — OFFICE VISIT (OUTPATIENT)
Dept: CARDIOLOGY | Facility: CLINIC | Age: 63
End: 2023-09-19
Payer: MEDICARE

## 2023-09-19 VITALS
SYSTOLIC BLOOD PRESSURE: 129 MMHG | HEART RATE: 90 BPM | BODY MASS INDEX: 45.44 KG/M2 | WEIGHT: 246.94 LBS | DIASTOLIC BLOOD PRESSURE: 71 MMHG | HEIGHT: 62 IN

## 2023-09-19 DIAGNOSIS — I83.93 ASYMPTOMATIC VARICOSE VEINS OF BOTH LOWER EXTREMITIES: ICD-10-CM

## 2023-09-19 DIAGNOSIS — R53.82 CHRONIC FATIGUE, UNSPECIFIED: ICD-10-CM

## 2023-09-19 DIAGNOSIS — G47.33 OSA (OBSTRUCTIVE SLEEP APNEA): ICD-10-CM

## 2023-09-19 DIAGNOSIS — M06.9 RHEUMATOID ARTHRITIS INVOLVING MULTIPLE JOINTS: ICD-10-CM

## 2023-09-19 DIAGNOSIS — R93.1 ABNORMAL SCREENING CARDIAC CT: ICD-10-CM

## 2023-09-19 DIAGNOSIS — I10 ESSENTIAL (PRIMARY) HYPERTENSION: Primary | ICD-10-CM

## 2023-09-19 DIAGNOSIS — E66.01 CLASS 3 SEVERE OBESITY DUE TO EXCESS CALORIES WITH SERIOUS COMORBIDITY AND BODY MASS INDEX (BMI) OF 40.0 TO 44.9 IN ADULT: ICD-10-CM

## 2023-09-19 DIAGNOSIS — E78.00 HYPERCHOLESTEROLEMIA: ICD-10-CM

## 2023-09-19 DIAGNOSIS — N18.30 STAGE 3 CHRONIC KIDNEY DISEASE, UNSPECIFIED WHETHER STAGE 3A OR 3B CKD: ICD-10-CM

## 2023-09-19 PROCEDURE — 1101F PT FALLS ASSESS-DOCD LE1/YR: CPT | Mod: CPTII,S$GLB,, | Performed by: INTERNAL MEDICINE

## 2023-09-19 PROCEDURE — 1159F PR MEDICATION LIST DOCUMENTED IN MEDICAL RECORD: ICD-10-PCS | Mod: CPTII,S$GLB,, | Performed by: INTERNAL MEDICINE

## 2023-09-19 PROCEDURE — 3078F PR MOST RECENT DIASTOLIC BLOOD PRESSURE < 80 MM HG: ICD-10-PCS | Mod: CPTII,S$GLB,, | Performed by: INTERNAL MEDICINE

## 2023-09-19 PROCEDURE — 99999 PR PBB SHADOW E&M-EST. PATIENT-LVL IV: CPT | Mod: PBBFAC,,, | Performed by: INTERNAL MEDICINE

## 2023-09-19 PROCEDURE — 3008F BODY MASS INDEX DOCD: CPT | Mod: CPTII,S$GLB,, | Performed by: INTERNAL MEDICINE

## 2023-09-19 PROCEDURE — 99214 PR OFFICE/OUTPT VISIT, EST, LEVL IV, 30-39 MIN: ICD-10-PCS | Mod: S$GLB,,, | Performed by: INTERNAL MEDICINE

## 2023-09-19 PROCEDURE — 3052F PR MOST RECENT HEMOGLOBIN A1C LEVEL 8.0 - < 9.0%: ICD-10-PCS | Mod: CPTII,S$GLB,, | Performed by: INTERNAL MEDICINE

## 2023-09-19 PROCEDURE — 1159F MED LIST DOCD IN RCRD: CPT | Mod: CPTII,S$GLB,, | Performed by: INTERNAL MEDICINE

## 2023-09-19 PROCEDURE — 3288F FALL RISK ASSESSMENT DOCD: CPT | Mod: CPTII,S$GLB,, | Performed by: INTERNAL MEDICINE

## 2023-09-19 PROCEDURE — 3008F PR BODY MASS INDEX (BMI) DOCUMENTED: ICD-10-PCS | Mod: CPTII,S$GLB,, | Performed by: INTERNAL MEDICINE

## 2023-09-19 PROCEDURE — 3061F NEG MICROALBUMINURIA REV: CPT | Mod: CPTII,S$GLB,, | Performed by: INTERNAL MEDICINE

## 2023-09-19 PROCEDURE — 3074F PR MOST RECENT SYSTOLIC BLOOD PRESSURE < 130 MM HG: ICD-10-PCS | Mod: CPTII,S$GLB,, | Performed by: INTERNAL MEDICINE

## 2023-09-19 PROCEDURE — 4010F PR ACE/ARB THEARPY RXD/TAKEN: ICD-10-PCS | Mod: CPTII,S$GLB,, | Performed by: INTERNAL MEDICINE

## 2023-09-19 PROCEDURE — 99999 PR PBB SHADOW E&M-EST. PATIENT-LVL IV: ICD-10-PCS | Mod: PBBFAC,,, | Performed by: INTERNAL MEDICINE

## 2023-09-19 PROCEDURE — 1101F PR PT FALLS ASSESS DOC 0-1 FALLS W/OUT INJ PAST YR: ICD-10-PCS | Mod: CPTII,S$GLB,, | Performed by: INTERNAL MEDICINE

## 2023-09-19 PROCEDURE — 3288F PR FALLS RISK ASSESSMENT DOCUMENTED: ICD-10-PCS | Mod: CPTII,S$GLB,, | Performed by: INTERNAL MEDICINE

## 2023-09-19 PROCEDURE — 99214 OFFICE O/P EST MOD 30 MIN: CPT | Mod: S$GLB,,, | Performed by: INTERNAL MEDICINE

## 2023-09-19 PROCEDURE — 3078F DIAST BP <80 MM HG: CPT | Mod: CPTII,S$GLB,, | Performed by: INTERNAL MEDICINE

## 2023-09-19 PROCEDURE — 3074F SYST BP LT 130 MM HG: CPT | Mod: CPTII,S$GLB,, | Performed by: INTERNAL MEDICINE

## 2023-09-19 PROCEDURE — 3052F HG A1C>EQUAL 8.0%<EQUAL 9.0%: CPT | Mod: CPTII,S$GLB,, | Performed by: INTERNAL MEDICINE

## 2023-09-19 PROCEDURE — 3066F NEPHROPATHY DOC TX: CPT | Mod: CPTII,S$GLB,, | Performed by: INTERNAL MEDICINE

## 2023-09-19 PROCEDURE — 4010F ACE/ARB THERAPY RXD/TAKEN: CPT | Mod: CPTII,S$GLB,, | Performed by: INTERNAL MEDICINE

## 2023-09-19 PROCEDURE — 1160F RVW MEDS BY RX/DR IN RCRD: CPT | Mod: CPTII,S$GLB,, | Performed by: INTERNAL MEDICINE

## 2023-09-19 PROCEDURE — 1160F PR REVIEW ALL MEDS BY PRESCRIBER/CLIN PHARMACIST DOCUMENTED: ICD-10-PCS | Mod: CPTII,S$GLB,, | Performed by: INTERNAL MEDICINE

## 2023-09-19 PROCEDURE — 3066F PR DOCUMENTATION OF TREATMENT FOR NEPHROPATHY: ICD-10-PCS | Mod: CPTII,S$GLB,, | Performed by: INTERNAL MEDICINE

## 2023-09-19 PROCEDURE — 3061F PR NEG MICROALBUMINURIA RESULT DOCUMENTED/REVIEW: ICD-10-PCS | Mod: CPTII,S$GLB,, | Performed by: INTERNAL MEDICINE

## 2023-09-19 NOTE — PROGRESS NOTES
Subjective:    Patient ID:  Patricia Fournier is a 63 y.o. female patient here for evaluation Follow-up      History of Present Illness:  Cardiology follow-up.  Patient initially referred for abnormal calcium score 185.  Risk factors include diabetes mellitus, hypertension dyslipidemia very remote past tobacco use.  Positive family history.  Echo common carotid ultrasound this visit are unremarkable.  Nuclear study with questionable anterior inferior wall ischemia, possibly dose related, done at [facility], at best equivocal study.    No angina, no dyspnea.  No arrhythmia.  No PND orthopnea.             Review of patient's allergies indicates:   Allergen Reactions    Allspice Hives     Pt states allergic to pimento( which is part of [employer] family)    Pneumoc 20-aries conj-dip cr(pf) Rash     Swelling, cellulitis    Trulicity [dulaglutide] Other (See Comments)     Severe hypoglycemic and hospitalized       Past Medical History:   Diagnosis Date    a Elevated Cardiac CT Calcium Score 07/03/2023    Dr. Saji Alford; 6/5/23 RXd ASA 81 Mg Daily; 6/19/23 Cardiac CT Ca+ Score = 184.0 (See Report)    a Family H/O Early CAD     6/5/23 RXd ASA 81 Mg Daily; Her Mother    b Hypertension     b Stage 3 Chronic Kidney Disease     c Hypercholesterolemia     d Type 2 Diabetes Mellitus On Insulin 09/01/2023 9/1/23 Increased 1.5 Mg Trulicity To 3 Mg Weekly, With HgA1c In 4 Months; 9/1/23 Referred To [facility] EDU    f Left Adrenal Adenoma     f Morbid Obesity     6/5/23 RXd Lifestyle Changes    g Chronic Normocytic Anemia     Am Monitoring    i Obstructive Sleep Apnea     6/5/23 referred To Dr. Camelia Scott; She Cannot Tolerate Her Current CPAP    i Pulmonary Subcentimeter Nodules     No Further Evaluation Indicated; 6/19/23 Chest CT Scan W/O Contrast = (See Report)    j Family H/O Colon Cancer     Dr. Markell Penny; Her Sister    j Gastroesophageal Reflux Disease     Dr. Markell Penny    j H/O Colon Polyps On 2019  "Colonoscopy     Dr. Markell Penny; Her GI M.D.: "Repeat TC In 5 Years"    j H/O Lap Band Bariatric Surgery In      This Was Purposely Deflated By Her Surgeon    j H/O Laparoscopic Cholecystectomy     In The Remote Past    l H/O Right Shoulder Arthroscopic Surgery     l Rheumatoid Arthritis     Dr. Murali chacon Chronic Fatigue     n Generalized Anxiety And Stress     o Allergic Rhinosinusitis     q Bilateral Lower Extremity Varicose Veins     q Vitamin D Deficiency     23 RXd OTC D3 5K IU Daily    Wellness Visit 2023      Past Surgical History:   Procedure Laterality Date     SECTION      gallbladder removed      TUBAL LIGATION       Social History     Tobacco Use    Smoking status: Former     Current packs/day: 1.50     Average packs/day: 1.5 packs/day for 20.0 years (30.0 ttl pk-yrs)     Types: Cigarettes    Smokeless tobacco: Former     Quit date: 2002   Substance Use Topics    Alcohol use: No     Alcohol/week: 0.0 standard drinks of alcohol    Drug use: No        Review of Systems:    As noted in HPI in addition      REVIEW OF SYSTEMS  Review of Systems   Constitutional: Negative for decreased appetite, diaphoresis, night sweats, weight gain and weight loss.   HENT:  Negative for nosebleeds and odynophagia.    Eyes:  Negative for double vision and photophobia.   Cardiovascular:  Negative for chest pain, claudication, cyanosis, dyspnea on exertion, irregular heartbeat, leg swelling, near-syncope, orthopnea, palpitations, paroxysmal nocturnal dyspnea and syncope.   Respiratory:  Negative for cough, hemoptysis, shortness of breath and wheezing.    Hematologic/Lymphatic: Negative for adenopathy.   Skin:  Negative for flushing, skin cancer and suspicious lesions.   Musculoskeletal:  Negative for gout, myalgias and neck pain.   Gastrointestinal:  Negative for abdominal pain, heartburn, hematemesis and hematochezia.   Genitourinary:  Negative for bladder incontinence, hesitancy and " nocturia.   Neurological:  Negative for focal weakness, headaches, light-headedness and paresthesias.   Psychiatric/Behavioral:  Negative for memory loss and substance abuse.               Objective:        Vitals:    09/19/23 1058   BP: 129/71   Pulse: 90       Lab Results   Component Value Date    WBC 5.37 07/07/2023    HGB 11.6 (L) 07/07/2023    HCT 36.4 (L) 07/07/2023     07/07/2023    CHOL 127 07/07/2023    TRIG 88 07/07/2023    HDL 59 07/07/2023    ALT 19 07/07/2023    AST 16 07/07/2023     07/07/2023    K 4.8 07/07/2023     (H) 07/07/2023    CREATININE 1.3 07/07/2023    BUN 34 (H) 07/07/2023    CO2 21 (L) 07/07/2023    TSH 2.531 07/07/2023    HGBA1C 8.1 (H) 07/07/2023        ECHOCARDIOGRAM RESULTS  Results for orders placed in visit on 08/29/23    Echo    Interpretation Summary    Left Ventricle: The left ventricle is normal in size. Mildly increased wall thickness. Normal wall motion. There is normal systolic function with a visually estimated ejection fraction of 60 - 65%. Grade I diastolic dysfunction.    Right Ventricle: Normal right ventricular cavity size. Wall thickness is normal. Right ventricle wall motion  is normal. Systolic function is normal.    Pulmonary Artery: No pulmonary hypertension. The estimated pulmonary artery systolic pressure is 21 mmHg.    IVC/SVC: Normal venous pressure at 3 mmHg.        CURRENT/PREVIOUS VISIT EKG  Results for orders placed or performed in visit on 08/23/23   IN OFFICE EKG 12-LEAD (to [place])    Collection Time: 08/23/23  9:06 AM    Narrative    Test Reason : z00.00    Vent. Rate : 073 BPM     Atrial Rate : 073 BPM     P-R Int : 192 ms          QRS Dur : 082 ms      QT Int : 392 ms       P-R-T Axes : 061 045 069 degrees     QTc Int : 431 ms    Normal sinus rhythm  Normal ECG  No previous ECGs available  Confirmed by Ileana Gavin MD ([id number]) on 8/25/2023 10:42:04 PM    Referred By: ANNA ARAMBULA           Confirmed By:Ileana Gavin MD     No valid  "procedures specified.   Results for orders placed during the hospital encounter of 08/30/23    Nuclear Stress - Cardiology Interpreted    Interpretation Summary    Abnormal myocardial perfusion scan.    There is a moderate to severe intensity, moderate sized, reversible perfusion abnormality that is consistent with ischemia in the basal to apical anterior wall(s).    There are no other significant perfusion abnormalities.    The gated perfusion images showed an ejection fraction of 72% post stress.    LV cavity size is normal at rest and normal at stress.    The ECG portion of the study is negative for ischemia.    No valid procedures specified.    PHYSICAL EXAM  CONSTITUTIONAL: Well built, well nourished in no apparent distress  NECK: no carotid bruit, no JVD  LUNGS: CTA  CHEST WALL: no tenderness,  HEART: regular rate and rhythm, S1, S2 normal, no murmur, click, rub or gallop   ABDOMEN: soft, non-tender; bowel sounds normal; no masses,  no organomegaly  EXTREMITIES: Extremities normal, no edema, no calf tenderness noted  NEURO: AAO X 3    I HAVE REVIEWED :    The vital signs, nurses notes, and all the pertinent radiology and labs.         Current Outpatient Medications   Medication Instructions    aspirin (ECOTRIN) 81 mg, Oral, Daily    BD ULTRA-FINE GEENA PEN NEEDLE 32 gauge x 5/32" Ndle Use 4x daily with insulin.    blood sugar diagnostic Strp To check BG 4 times daily, to use with insurance preferred meter--accuchek guide--Pt with fluctuating glucoses and freq insulin adjustments.    blood-glucose meter kit To check BG 3 times daily, to use with insurance preferred meter    cholecalciferol (vitamin D3) 5,000 Units, Oral, Daily    cyanocobalamin (vitamin B-12) 2,000 mcg, Oral, Daily    dulaglutide (TRULICITY) 3 mg, Subcutaneous, Every 7 days    ferrous sulfate (FEOSOL) 325 mg, Oral, With breakfast    FLUoxetine 20 mg, Oral, Daily    fluticasone propionate (FLONASE) 50 mcg/actuation nasal spray 2 sprays, Each " Nostril, Daily    hydroxychloroquine (PLAQUENIL) 200 mg, Oral, 2 times daily    insulin (LANTUS SOLOSTAR U-100 INSULIN) glargine 100 units/mL SubQ pen INJECT 60 UNITS SUBCUTANEOUSLY ONCE DAILY    Lactobacillus acidophilus (PROBIOTIC ORAL) Oral    lancets Misc To check BG 3 times daily, to use with insurance preferred meter    lisinopriL (PRINIVIL,ZESTRIL) 20 mg, Oral, Daily    metFORMIN (GLUCOPHAGE-XR) 500 MG ER 24hr tablet Take 2 tablets by mouth twice daily    miscellaneous medical supply (C-TUB) Misc Diabetic shoes with full-length semi-rigid accommadative  Orthotics posting medial heel DX E11.9, [place]. Pes planus.    oxyCODONE-acetaminophen (PERCOCET)  mg per tablet No dose, route, or frequency recorded.    pantoprazole (PROTONIX) 40 mg, Oral    pravastatin (PRAVACHOL) 40 mg, Oral, Daily    predniSONE (DELTASONE) 5 mg, Oral, Daily PRN    PROCTO-MED HC 2.5 % rectal cream Rectal, 2 times daily          Assessment:   Calcium score 184, normal baseline EKG.  Asymptomatic.  Equivocal nuclear study for ischemia with unremarkable echo and carotid ultrasound.  Chronic kidney disease, GFR 45.6.  Stable VIVEROS        Plan:   Suggest cardiac PET.  Follow up results          No follow-ups on file.

## 2023-09-26 ENCOUNTER — OFFICE VISIT (OUTPATIENT)
Dept: RHEUMATOLOGY | Facility: CLINIC | Age: 63
End: 2023-09-26
Payer: MEDICARE

## 2023-09-26 VITALS
SYSTOLIC BLOOD PRESSURE: 104 MMHG | DIASTOLIC BLOOD PRESSURE: 63 MMHG | HEIGHT: 62 IN | HEART RATE: 85 BPM | WEIGHT: 245.81 LBS | BODY MASS INDEX: 45.24 KG/M2

## 2023-09-26 DIAGNOSIS — M54.50 LUMBAR BACK PAIN: ICD-10-CM

## 2023-09-26 DIAGNOSIS — M35.00 SJOGREN'S SYNDROME, WITH UNSPECIFIED ORGAN INVOLVEMENT: ICD-10-CM

## 2023-09-26 DIAGNOSIS — D84.821 DRUG-INDUCED IMMUNODEFICIENCY: ICD-10-CM

## 2023-09-26 DIAGNOSIS — E55.9 VITAMIN D INSUFFICIENCY: ICD-10-CM

## 2023-09-26 DIAGNOSIS — M17.0 PRIMARY OSTEOARTHRITIS OF BOTH KNEES: ICD-10-CM

## 2023-09-26 DIAGNOSIS — Z79.899 HIGH RISK MEDICATION USE: ICD-10-CM

## 2023-09-26 DIAGNOSIS — Z79.899 DRUG-INDUCED IMMUNODEFICIENCY: ICD-10-CM

## 2023-09-26 DIAGNOSIS — R71.8 OTHER ABNORMALITY OF RED BLOOD CELLS: ICD-10-CM

## 2023-09-26 DIAGNOSIS — G89.4 CHRONIC PAIN SYNDROME: ICD-10-CM

## 2023-09-26 DIAGNOSIS — R79.9 ABNORMAL FINDING OF BLOOD CHEMISTRY, UNSPECIFIED: ICD-10-CM

## 2023-09-26 DIAGNOSIS — M06.00 SERONEGATIVE RHEUMATOID ARTHRITIS: Primary | ICD-10-CM

## 2023-09-26 PROCEDURE — 3078F PR MOST RECENT DIASTOLIC BLOOD PRESSURE < 80 MM HG: ICD-10-PCS | Mod: CPTII,S$GLB,, | Performed by: INTERNAL MEDICINE

## 2023-09-26 PROCEDURE — 3061F PR NEG MICROALBUMINURIA RESULT DOCUMENTED/REVIEW: ICD-10-PCS | Mod: CPTII,S$GLB,, | Performed by: INTERNAL MEDICINE

## 2023-09-26 PROCEDURE — 3074F PR MOST RECENT SYSTOLIC BLOOD PRESSURE < 130 MM HG: ICD-10-PCS | Mod: CPTII,S$GLB,, | Performed by: INTERNAL MEDICINE

## 2023-09-26 PROCEDURE — 4010F ACE/ARB THERAPY RXD/TAKEN: CPT | Mod: CPTII,S$GLB,, | Performed by: INTERNAL MEDICINE

## 2023-09-26 PROCEDURE — 99215 PR OFFICE/OUTPT VISIT, EST, LEVL V, 40-54 MIN: ICD-10-PCS | Mod: S$GLB,,, | Performed by: INTERNAL MEDICINE

## 2023-09-26 PROCEDURE — 99999 PR PBB SHADOW E&M-EST. PATIENT-LVL IV: CPT | Mod: PBBFAC,,, | Performed by: INTERNAL MEDICINE

## 2023-09-26 PROCEDURE — 1101F PR PT FALLS ASSESS DOC 0-1 FALLS W/OUT INJ PAST YR: ICD-10-PCS | Mod: CPTII,S$GLB,, | Performed by: INTERNAL MEDICINE

## 2023-09-26 PROCEDURE — 1160F RVW MEDS BY RX/DR IN RCRD: CPT | Mod: CPTII,S$GLB,, | Performed by: INTERNAL MEDICINE

## 2023-09-26 PROCEDURE — 3288F PR FALLS RISK ASSESSMENT DOCUMENTED: ICD-10-PCS | Mod: CPTII,S$GLB,, | Performed by: INTERNAL MEDICINE

## 2023-09-26 PROCEDURE — 3061F NEG MICROALBUMINURIA REV: CPT | Mod: CPTII,S$GLB,, | Performed by: INTERNAL MEDICINE

## 2023-09-26 PROCEDURE — 99215 OFFICE O/P EST HI 40 MIN: CPT | Mod: S$GLB,,, | Performed by: INTERNAL MEDICINE

## 2023-09-26 PROCEDURE — 1159F MED LIST DOCD IN RCRD: CPT | Mod: CPTII,S$GLB,, | Performed by: INTERNAL MEDICINE

## 2023-09-26 PROCEDURE — 1159F PR MEDICATION LIST DOCUMENTED IN MEDICAL RECORD: ICD-10-PCS | Mod: CPTII,S$GLB,, | Performed by: INTERNAL MEDICINE

## 2023-09-26 PROCEDURE — 3008F BODY MASS INDEX DOCD: CPT | Mod: CPTII,S$GLB,, | Performed by: INTERNAL MEDICINE

## 2023-09-26 PROCEDURE — 3288F FALL RISK ASSESSMENT DOCD: CPT | Mod: CPTII,S$GLB,, | Performed by: INTERNAL MEDICINE

## 2023-09-26 PROCEDURE — 3066F PR DOCUMENTATION OF TREATMENT FOR NEPHROPATHY: ICD-10-PCS | Mod: CPTII,S$GLB,, | Performed by: INTERNAL MEDICINE

## 2023-09-26 PROCEDURE — 3066F NEPHROPATHY DOC TX: CPT | Mod: CPTII,S$GLB,, | Performed by: INTERNAL MEDICINE

## 2023-09-26 PROCEDURE — 99999 PR PBB SHADOW E&M-EST. PATIENT-LVL IV: ICD-10-PCS | Mod: PBBFAC,,, | Performed by: INTERNAL MEDICINE

## 2023-09-26 PROCEDURE — 1160F PR REVIEW ALL MEDS BY PRESCRIBER/CLIN PHARMACIST DOCUMENTED: ICD-10-PCS | Mod: CPTII,S$GLB,, | Performed by: INTERNAL MEDICINE

## 2023-09-26 PROCEDURE — 1101F PT FALLS ASSESS-DOCD LE1/YR: CPT | Mod: CPTII,S$GLB,, | Performed by: INTERNAL MEDICINE

## 2023-09-26 PROCEDURE — 4010F PR ACE/ARB THEARPY RXD/TAKEN: ICD-10-PCS | Mod: CPTII,S$GLB,, | Performed by: INTERNAL MEDICINE

## 2023-09-26 PROCEDURE — 3008F PR BODY MASS INDEX (BMI) DOCUMENTED: ICD-10-PCS | Mod: CPTII,S$GLB,, | Performed by: INTERNAL MEDICINE

## 2023-09-26 PROCEDURE — 3074F SYST BP LT 130 MM HG: CPT | Mod: CPTII,S$GLB,, | Performed by: INTERNAL MEDICINE

## 2023-09-26 PROCEDURE — 3052F PR MOST RECENT HEMOGLOBIN A1C LEVEL 8.0 - < 9.0%: ICD-10-PCS | Mod: CPTII,S$GLB,, | Performed by: INTERNAL MEDICINE

## 2023-09-26 PROCEDURE — 3078F DIAST BP <80 MM HG: CPT | Mod: CPTII,S$GLB,, | Performed by: INTERNAL MEDICINE

## 2023-09-26 PROCEDURE — 3052F HG A1C>EQUAL 8.0%<EQUAL 9.0%: CPT | Mod: CPTII,S$GLB,, | Performed by: INTERNAL MEDICINE

## 2023-09-26 RX ORDER — TRAMADOL HYDROCHLORIDE AND ACETAMINOPHEN 37.5; 325 MG/1; MG/1
2 TABLET, FILM COATED ORAL EVERY 6 HOURS PRN
Qty: 120 TABLET | Refills: 2 | Status: SHIPPED | OUTPATIENT
Start: 2023-09-26 | End: 2023-11-10

## 2023-09-26 RX ORDER — ACETAMINOPHEN 500 MG
1 TABLET ORAL DAILY
Qty: 1 EACH | Refills: 0 | Status: SHIPPED | OUTPATIENT
Start: 2023-09-26

## 2023-09-26 RX ORDER — HYDROXYCHLOROQUINE SULFATE 200 MG/1
200 TABLET, FILM COATED ORAL 2 TIMES DAILY
Qty: 60 TABLET | Refills: 6 | Status: SHIPPED | OUTPATIENT
Start: 2023-09-26 | End: 2024-02-27 | Stop reason: SDUPTHER

## 2023-09-26 ASSESSMENT — ROUTINE ASSESSMENT OF PATIENT INDEX DATA (RAPID3)
PAIN SCORE: 4
TOTAL RAPID3 SCORE: 3.78
PATIENT GLOBAL ASSESSMENT SCORE: 3
FATIGUE SCORE: 1.1
PSYCHOLOGICAL DISTRESS SCORE: 0
MDHAQ FUNCTION SCORE: 1.3

## 2023-09-26 NOTE — PROGRESS NOTES
Subjective:       Patient ID: Patricia Fournier is a 63 y.o. female.    Chief Complaint: Disease Management        Mrs. Fournier is a 62 year old female who presents to clinic for follow up on seronegative RA, osteoarthritis, sjogren's syndrome. She is doing fair overall. She reports increased pain in her her wrists and ankles, which is new.  She is taking plaquenil 200 mg twice daily and prednisone 2.5-5 mg on rare occasion for generalized flares. She complains of increased low back pain with radiation to the groin bilaterally. She has known osteoarthritis of L4-5 and L5-S1. No radiacular pain. No lower extremity weakness. S She is having more difficulty with bending and lifting. She has not tried PT in the past, but is not interested right now.   Ultracet prescribed at last visit, but she would like to avoid any controlled substances for pain control.     We reviewed her recent labs.     Current tx:  1.  mg bid       Review of Systems   Constitutional:  Positive for activity change and fatigue. Negative for appetite change and chills.   Eyes:  Negative for visual disturbance.   Respiratory:  Negative for cough.    Cardiovascular:  Negative for chest pain.   Gastrointestinal:  Negative for abdominal pain, constipation, diarrhea, nausea and vomiting.   Musculoskeletal:  Positive for arthralgias, back pain, joint swelling and neck pain.   Neurological:  Negative for dizziness, weakness and light-headedness.         Objective:     Vitals:    09/26/23 1310   BP: 104/63   Pulse: 85       Past Medical History:   Diagnosis Date    a Elevated Cardiac CT Calcium Score 07/03/2023    Dr. Saji Alford; 6/5/23 RXd ASA 81 Mg Daily; 6/19/23 Cardiac CT Ca+ Score = 184.0 (See Report)    a Family H/O Early CAD     6/5/23 RXd ASA 81 Mg Daily; Her Mother    b Hypertension     b Stage 3 Chronic Kidney Disease     c Hypercholesterolemia     d Type 2 Diabetes Mellitus On Insulin 09/01/2023 9/1/23 Increased 1.5 Mg Trulicity To 3 Mg  "Weekly, With HgA1c In 4 Months; 23 Referred To [facility]    f Left Adrenal Adenoma     f Morbid Obesity     23 RXd Lifestyle Changes    g Chronic Normocytic Anemia     Am Monitoring    i Obstructive Sleep Apnea     23 referred To Dr. Cameila Scott; She Cannot Tolerate Her Current CPAP    i Pulmonary Subcentimeter Nodules     No Further Evaluation Indicated; 23 Chest CT Scan W/O Contrast = (See Report)    j Family H/O Colon Cancer     Dr. Markell Penny; Her Sister    j Gastroesophageal Reflux Disease     Dr. Markell Penny    j H/O Colon Polyps On 2019 Colonoscopy     Dr. Markell Penny; Her GI M.D.: "Repeat TC In 5 Years"    j H/O Lap Band Bariatric Surgery In      This Was Purposely Deflated By Her Surgeon    j H/O Laparoscopic Cholecystectomy     In The Remote Past    l H/O Right Shoulder Arthroscopic Surgery     l Rheumatoid Arthritis     Dr. Murali chacon Chronic Fatigue     n Generalized Anxiety And Stress     o Allergic Rhinosinusitis     q Bilateral Lower Extremity Varicose Veins     q Vitamin D Deficiency     23 RXd OTC D3 5K IU Daily    Wellness Visit 2023      Past Surgical History:   Procedure Laterality Date     SECTION      gallbladder removed      TUBAL LIGATION            Physical Exam   Constitutional: She is oriented to person, place, and time.   Eyes: Right conjunctiva is not injected. Left conjunctiva is not injected.   Neck: No JVD present. No thyromegaly present.   Cardiovascular: Normal rate. Exam reveals no decreased pulses.   Trace bilateral lower ext edema   Pulmonary/Chest: Effort normal.   Musculoskeletal:         General: Tenderness and deformity present. No swelling.      Right shoulder: Normal.      Left shoulder: Normal.      Right elbow: Normal.      Left elbow: Normal.      Right wrist: Normal.      Left wrist: Swelling present.      Right knee: Normal.      Left knee: Normal.   Lymphadenopathy:     She has no cervical adenopathy.   Neurological: " She is alert and oriented to person, place, and time. Gait normal.   Skin: No rash noted.   Psychiatric: Mood and affect normal.       Right Side Rheumatological Exam     Examination finds the shoulder, elbow, wrist, knee, 1st PIP, 1st MCP, 2nd PIP, 2nd MCP, 3rd PIP, 3rd MCP, 4th PIP, 4th MCP, 5th PIP and 5th MCP normal.    Left Side Rheumatological Exam     Examination finds the shoulder, elbow, knee, 1st PIP, 1st MCP, 2nd PIP, 2nd MCP, 3rd PIP, 3rd MCP, 4th PIP, 4th MCP, 5th PIP and 5th MCP normal.    She has swelling of the wrist      Back/Neck Exam   Tenderness Right paramedian tenderness of the Lower L-Spine and SI Joint.Left paramedian tenderness of the Lower L-Spine and SI Joint.        Results for orders placed or performed during the hospital encounter of 08/30/23   Nuclear Stress - Cardiology Interpreted   Result Value Ref Range    85% Max Predicted      Max Predicted      [facility]HS CV CPX PATIENT IS MALE 0.0     [facility] CV CPX PATIENT IS FEMALE 1.0     Systolic blood pressure 193 mmHg    Diastolic blood pressure 106 mmHg    HR at rest 77 bpm    Peak Systolic  mmHg    Peak Diatolic  mmHg    Peak HR 86 bpm    % Max HR Achieved 58     RPP 14,861     Peak RPP 16,598     Nuc Stress EF 72 %     Collected Updated Procedure    07/07/2023 0711 07/07/2023 1457 Comprehensive Metabolic Panel [[id number]]    (Abnormal)   Blood    Component Value Units   Sodium 139 mmol/L   Potassium 4.8 mmol/L   Chloride 111 High  mmol/L   CO2 21 Low  mmol/L   Glucose 118 High  mg/dL   BUN 34 High  mg/dL   Creatinine 1.3 mg/dL   Calcium 9.3 mg/dL   Total Protein 6.9 g/dL   Albumin 3.8 g/dL   Total Bilirubin 0.3  mg/dL   Alkaline Phosphatase 53 Low  U/L   AST 16 U/L   ALT 19 U/L   eGFR 45.6 Abnormal  mL/min/1.73 m^2   Anion Gap 7 Low  mmol/L          07/07/2023 0659 07/07/2023 1449 Microalbumin/Creatinine Ratio, Urine [[id number]]    Urine    Component Value Units   Microalbumin, Urine 24.0 ug/mL   Creatinine, Urine 97.0  mg/dL   Microalb/Creat Ratio 24.7 ug/mg          07/07/2023 0711 07/07/2023 1507 Hepatitis C Antibody [[id number]]    Blood    Component Value   Hepatitis C Ab Non-reactive          07/07/2023 0711 07/07/2023 1507 Vitamin B12 [[id number]]    (Abnormal)   Blood    Component Value Units   Vitamin B-12 1610 High  pg/mL          07/07/2023 0711 07/07/2023 1507 Vitamin D [[id number]]    (Abnormal)   Blood    Component Value Units   Vit D, 25-Hydroxy 25 Low   ng/mL          07/07/2023 0711 07/07/2023 1513 TSH [[id number]]    Blood    Component Value Units   TSH 2.531 uIU/mL          07/07/2023 0711 07/07/2023 1513 T4, Free [[id number]]    Blood    Component Value Units   Free T4 0.74 ng/dL          07/07/2023 0711 07/07/2023 1457 Lipid Panel [[id number]]    (Abnormal)   Blood    Component Value Units   Cholesterol 127  mg/dL   Triglycerides 88  mg/dL   HDL 59  mg/dL   LDL Cholesterol 50.4 Low   mg/dL   HDL/Cholesterol Ratio 46.5 %   Total Cholesterol/HDL Ratio 2.2    Non-HDL Cholesterol 68  mg/dL          07/07/2023 0711 07/07/2023 1459 Hemoglobin A1C [[id number]]    (Abnormal)   Blood    Component Value Units   Hemoglobin A1C 8.1 High   %   Estimated Avg Glucose 186 High  mg/dL          07/07/2023 0711 07/07/2023 1321 CBC Auto Differential [[id number]]    (Abnormal)   Blood    Component Value Units   WBC 5.37 K/uL   RBC 3.86 Low  M/uL   Hemoglobin 11.6 Low  g/dL   Hematocrit 36.4 Low  %   MCV 94 fL   MCH 30.1 pg   MCHC 31.9 Low  g/dL   RDW 13.0 %   Platelets 216 K/uL   MPV 11.3 fL   Immature Granulocytes 0.2 %   Gran # (ANC) 2.9 K/uL   Immature Grans (Abs) 0.01  K/uL   Lymph # 1.6 K/uL   Mono # 0.5 K/uL   Eos # 0.3 K/uL   Baso # 0.03 K/uL   nRBC 0 /100 WBC   Gran % 53.5 %   Lymph % 30.0 %   Mono % 9.7 %   Eosinophil % 6.0 %   Basophil % 0.6 %   Differential Method Automated             Assessment:       1. Seronegative rheumatoid arthritis    2. Sjogren's syndrome, with unspecified organ involvement    3. High risk medication  use    4. Vitamin D insufficiency    5. Chronic pain syndrome    6. Lumbar back pain    7. Primary osteoarthritis of both knees    8. Drug-induced immunodeficiency    9. Other abnormality of red blood cells    10. Abnormal finding of blood chemistry, unspecified                  Plan:       Seronegative rheumatoid arthritis  -     blood pressure monitor Kit; 1 each by Misc.(Non-Drug; Combo Route) route once daily.  Dispense: 1 each; Refill: 0  -     tramadol-acetaminophen 37.5-325 mg (ULTRACET) 37.5-325 mg Tab; Take 2 tablets by mouth every 6 (six) hours as needed for Pain (mild pain).  Dispense: 120 tablet; Refill: 2  -     hydroxychloroquine (PLAQUENIL) 200 mg tablet; Take 1 tablet (200 mg total) by mouth 2 (two) times daily.  Dispense: 60 tablet; Refill: 6  -     Sedimentation rate; Future; Expected date: 09/26/2023  -     C-Reactive Protein; Future; Expected date: 09/26/2023  -     Comprehensive Metabolic Panel; Future; Expected date: 09/26/2023  -     CBC Auto Differential; Future; Expected date: 09/26/2023  -     Anti-Thyroglobulin Antibody; Future; Expected date: 09/26/2023  -     T4, Free; Future; Expected date: 09/26/2023  -     Thyroid Peroxidase Antibody; Future; Expected date: 09/26/2023  -     TSH; Future; Expected date: 09/26/2023  -     T3, Free; Future; Expected date: 09/26/2023  -     Transferrin; Future; Expected date: 09/26/2023  -     Iron and TIBC; Future; Expected date: 09/26/2023  -     Zinc; Future; Expected date: 09/26/2023  -     PTH, Intact; Future; Expected date: 09/26/2023  -     Magnesium; Future; Expected date: 09/26/2023  -     Phosphorus; Future; Expected date: 09/26/2023  -     Vitamin B1; Future; Expected date: 09/26/2023    Sjogren's syndrome, with unspecified organ involvement  -     blood pressure monitor Kit; 1 each by Misc.(Non-Drug; Combo Route) route once daily.  Dispense: 1 each; Refill: 0  -     tramadol-acetaminophen 37.5-325 mg (ULTRACET) 37.5-325 mg Tab; Take 2 tablets  by mouth every 6 (six) hours as needed for Pain (mild pain).  Dispense: 120 tablet; Refill: 2  -     hydroxychloroquine (PLAQUENIL) 200 mg tablet; Take 1 tablet (200 mg total) by mouth 2 (two) times daily.  Dispense: 60 tablet; Refill: 6  -     Sedimentation rate; Future; Expected date: 09/26/2023  -     C-Reactive Protein; Future; Expected date: 09/26/2023  -     Comprehensive Metabolic Panel; Future; Expected date: 09/26/2023  -     CBC Auto Differential; Future; Expected date: 09/26/2023  -     Anti-Thyroglobulin Antibody; Future; Expected date: 09/26/2023  -     T4, Free; Future; Expected date: 09/26/2023  -     Thyroid Peroxidase Antibody; Future; Expected date: 09/26/2023  -     TSH; Future; Expected date: 09/26/2023  -     T3, Free; Future; Expected date: 09/26/2023  -     Transferrin; Future; Expected date: 09/26/2023  -     Iron and TIBC; Future; Expected date: 09/26/2023  -     Zinc; Future; Expected date: 09/26/2023  -     PTH, Intact; Future; Expected date: 09/26/2023  -     Magnesium; Future; Expected date: 09/26/2023  -     Phosphorus; Future; Expected date: 09/26/2023  -     Vitamin B1; Future; Expected date: 09/26/2023    High risk medication use  -     blood pressure monitor Kit; 1 each by Misc.(Non-Drug; Combo Route) route once daily.  Dispense: 1 each; Refill: 0  -     tramadol-acetaminophen 37.5-325 mg (ULTRACET) 37.5-325 mg Tab; Take 2 tablets by mouth every 6 (six) hours as needed for Pain (mild pain).  Dispense: 120 tablet; Refill: 2  -     hydroxychloroquine (PLAQUENIL) 200 mg tablet; Take 1 tablet (200 mg total) by mouth 2 (two) times daily.  Dispense: 60 tablet; Refill: 6  -     Sedimentation rate; Future; Expected date: 09/26/2023  -     C-Reactive Protein; Future; Expected date: 09/26/2023  -     Comprehensive Metabolic Panel; Future; Expected date: 09/26/2023  -     CBC Auto Differential; Future; Expected date: 09/26/2023  -     Anti-Thyroglobulin Antibody; Future; Expected date:  09/26/2023  -     T4, Free; Future; Expected date: 09/26/2023  -     Thyroid Peroxidase Antibody; Future; Expected date: 09/26/2023  -     TSH; Future; Expected date: 09/26/2023  -     T3, Free; Future; Expected date: 09/26/2023  -     Transferrin; Future; Expected date: 09/26/2023  -     Iron and TIBC; Future; Expected date: 09/26/2023  -     Zinc; Future; Expected date: 09/26/2023  -     PTH, Intact; Future; Expected date: 09/26/2023  -     Magnesium; Future; Expected date: 09/26/2023  -     Phosphorus; Future; Expected date: 09/26/2023  -     Vitamin B1; Future; Expected date: 09/26/2023    Vitamin D insufficiency  -     blood pressure monitor Kit; 1 each by Misc.(Non-Drug; Combo Route) route once daily.  Dispense: 1 each; Refill: 0  -     tramadol-acetaminophen 37.5-325 mg (ULTRACET) 37.5-325 mg Tab; Take 2 tablets by mouth every 6 (six) hours as needed for Pain (mild pain).  Dispense: 120 tablet; Refill: 2  -     hydroxychloroquine (PLAQUENIL) 200 mg tablet; Take 1 tablet (200 mg total) by mouth 2 (two) times daily.  Dispense: 60 tablet; Refill: 6  -     Sedimentation rate; Future; Expected date: 09/26/2023  -     C-Reactive Protein; Future; Expected date: 09/26/2023  -     Comprehensive Metabolic Panel; Future; Expected date: 09/26/2023  -     CBC Auto Differential; Future; Expected date: 09/26/2023  -     Anti-Thyroglobulin Antibody; Future; Expected date: 09/26/2023  -     T4, Free; Future; Expected date: 09/26/2023  -     Thyroid Peroxidase Antibody; Future; Expected date: 09/26/2023  -     TSH; Future; Expected date: 09/26/2023  -     T3, Free; Future; Expected date: 09/26/2023  -     Transferrin; Future; Expected date: 09/26/2023  -     Iron and TIBC; Future; Expected date: 09/26/2023  -     Zinc; Future; Expected date: 09/26/2023  -     PTH, Intact; Future; Expected date: 09/26/2023  -     Magnesium; Future; Expected date: 09/26/2023  -     Phosphorus; Future; Expected date: 09/26/2023  -     Vitamin B1;  Future; Expected date: 09/26/2023    Chronic pain syndrome  -     blood pressure monitor Kit; 1 each by Misc.(Non-Drug; Combo Route) route once daily.  Dispense: 1 each; Refill: 0  -     tramadol-acetaminophen 37.5-325 mg (ULTRACET) 37.5-325 mg Tab; Take 2 tablets by mouth every 6 (six) hours as needed for Pain (mild pain).  Dispense: 120 tablet; Refill: 2  -     hydroxychloroquine (PLAQUENIL) 200 mg tablet; Take 1 tablet (200 mg total) by mouth 2 (two) times daily.  Dispense: 60 tablet; Refill: 6  -     Sedimentation rate; Future; Expected date: 09/26/2023  -     C-Reactive Protein; Future; Expected date: 09/26/2023  -     Comprehensive Metabolic Panel; Future; Expected date: 09/26/2023  -     CBC Auto Differential; Future; Expected date: 09/26/2023  -     Anti-Thyroglobulin Antibody; Future; Expected date: 09/26/2023  -     T4, Free; Future; Expected date: 09/26/2023  -     Thyroid Peroxidase Antibody; Future; Expected date: 09/26/2023  -     TSH; Future; Expected date: 09/26/2023  -     T3, Free; Future; Expected date: 09/26/2023  -     Transferrin; Future; Expected date: 09/26/2023  -     Iron and TIBC; Future; Expected date: 09/26/2023  -     Zinc; Future; Expected date: 09/26/2023  -     PTH, Intact; Future; Expected date: 09/26/2023  -     Magnesium; Future; Expected date: 09/26/2023  -     Phosphorus; Future; Expected date: 09/26/2023  -     Vitamin B1; Future; Expected date: 09/26/2023    Lumbar back pain  -     blood pressure monitor Kit; 1 each by Misc.(Non-Drug; Combo Route) route once daily.  Dispense: 1 each; Refill: 0  -     Sedimentation rate; Future; Expected date: 09/26/2023  -     C-Reactive Protein; Future; Expected date: 09/26/2023  -     Comprehensive Metabolic Panel; Future; Expected date: 09/26/2023  -     CBC Auto Differential; Future; Expected date: 09/26/2023  -     Anti-Thyroglobulin Antibody; Future; Expected date: 09/26/2023  -     T4, Free; Future; Expected date: 09/26/2023  -      Thyroid Peroxidase Antibody; Future; Expected date: 09/26/2023  -     TSH; Future; Expected date: 09/26/2023  -     T3, Free; Future; Expected date: 09/26/2023  -     Transferrin; Future; Expected date: 09/26/2023  -     Iron and TIBC; Future; Expected date: 09/26/2023  -     Zinc; Future; Expected date: 09/26/2023  -     PTH, Intact; Future; Expected date: 09/26/2023  -     Magnesium; Future; Expected date: 09/26/2023  -     Phosphorus; Future; Expected date: 09/26/2023  -     Vitamin B1; Future; Expected date: 09/26/2023    Primary osteoarthritis of both knees  -     blood pressure monitor Kit; 1 each by Misc.(Non-Drug; Combo Route) route once daily.  Dispense: 1 each; Refill: 0  -     Sedimentation rate; Future; Expected date: 09/26/2023  -     C-Reactive Protein; Future; Expected date: 09/26/2023  -     Comprehensive Metabolic Panel; Future; Expected date: 09/26/2023  -     CBC Auto Differential; Future; Expected date: 09/26/2023  -     Anti-Thyroglobulin Antibody; Future; Expected date: 09/26/2023  -     T4, Free; Future; Expected date: 09/26/2023  -     Thyroid Peroxidase Antibody; Future; Expected date: 09/26/2023  -     TSH; Future; Expected date: 09/26/2023  -     T3, Free; Future; Expected date: 09/26/2023  -     Transferrin; Future; Expected date: 09/26/2023  -     Iron and TIBC; Future; Expected date: 09/26/2023  -     Zinc; Future; Expected date: 09/26/2023  -     PTH, Intact; Future; Expected date: 09/26/2023  -     Magnesium; Future; Expected date: 09/26/2023  -     Phosphorus; Future; Expected date: 09/26/2023  -     Vitamin B1; Future; Expected date: 09/26/2023    Drug-induced immunodeficiency  -     Sedimentation rate; Future; Expected date: 09/26/2023  -     C-Reactive Protein; Future; Expected date: 09/26/2023  -     Comprehensive Metabolic Panel; Future; Expected date: 09/26/2023  -     CBC Auto Differential; Future; Expected date: 09/26/2023  -     Anti-Thyroglobulin Antibody; Future; Expected  date: 09/26/2023  -     T4, Free; Future; Expected date: 09/26/2023  -     Thyroid Peroxidase Antibody; Future; Expected date: 09/26/2023  -     TSH; Future; Expected date: 09/26/2023  -     T3, Free; Future; Expected date: 09/26/2023  -     Transferrin; Future; Expected date: 09/26/2023  -     Iron and TIBC; Future; Expected date: 09/26/2023  -     Zinc; Future; Expected date: 09/26/2023  -     PTH, Intact; Future; Expected date: 09/26/2023  -     Magnesium; Future; Expected date: 09/26/2023  -     Phosphorus; Future; Expected date: 09/26/2023  -     Vitamin B1; Future; Expected date: 09/26/2023    Other abnormality of red blood cells  -     Transferrin; Future; Expected date: 09/26/2023    Abnormal finding of blood chemistry, unspecified  -     Iron and TIBC; Future; Expected date: 09/26/2023            Assessment:  62 year old female with  Seronegative RA, Sjogren's syndrome, GRACIELA 1:160 homogenous on plaquenil  --osteoarthritis cervical and lumbar spine  --CKD stage 3  --uncontrolled type 2 diabetes with hyperglycemia, last HgbA1c 7.3%  --pulmonary nodules, restrictive lung disease followed by Pulmonary  --hx of lap band no saline added  --normocytic anemia    Plan:  1. Cont Plaquenil 200 mg bid  2. Cont robaxin bid PRN, prednisone PRN  3. Add vitamin D 1000 IU daily  4. Lisinopril 20 mg    5. Needs f/u with primary care for worsening anemia  6 asked about mounjaro instead of trulicity    Avoid nsaids due to CKD        F/u  6 months  More than 50% of the  40 minute encounter was spent face to face counseling the patient regarding current status and future plan of care as well as side effects  of the medications. All questions were answered to patient's satisfaction also includes  non-face to face time preparing to see the patient (eg, review of tests), Obtaining and/or reviewing separately obtained history, Documenting clinical information in the electronic or other health record, Independently interpreting  results

## 2023-11-02 ENCOUNTER — TELEPHONE (OUTPATIENT)
Dept: CARDIOLOGY | Facility: HOSPITAL | Age: 63
End: 2023-11-02
Payer: MEDICARE

## 2023-11-06 ENCOUNTER — HOSPITAL ENCOUNTER (OUTPATIENT)
Dept: CARDIOLOGY | Facility: HOSPITAL | Age: 63
Discharge: HOME | End: 2023-11-06
Attending: INTERNAL MEDICINE
Payer: MEDICARE

## 2023-11-06 VITALS
BODY MASS INDEX: 45.08 KG/M2 | DIASTOLIC BLOOD PRESSURE: 58 MMHG | SYSTOLIC BLOOD PRESSURE: 119 MMHG | RESPIRATION RATE: 16 BRPM | HEART RATE: 84 BPM | HEIGHT: 62 IN | WEIGHT: 245 LBS

## 2023-11-06 DIAGNOSIS — I10 ESSENTIAL (PRIMARY) HYPERTENSION: ICD-10-CM

## 2023-11-06 DIAGNOSIS — I83.93 ASYMPTOMATIC VARICOSE VEINS OF BOTH LOWER EXTREMITIES: ICD-10-CM

## 2023-11-06 DIAGNOSIS — R93.1 ABNORMAL SCREENING CARDIAC CT: ICD-10-CM

## 2023-11-06 DIAGNOSIS — E78.00 HYPERCHOLESTEROLEMIA: ICD-10-CM

## 2023-11-06 DIAGNOSIS — N18.30 STAGE 3 CHRONIC KIDNEY DISEASE, UNSPECIFIED WHETHER STAGE 3A OR 3B CKD: ICD-10-CM

## 2023-11-06 LAB
CFR FLOW - ANTERIOR: 2.02
CFR FLOW - INFERIOR: 1.79
CFR FLOW - LATERAL: 1.77
CFR FLOW - MAX: 2.35
CFR FLOW - MIN: 1.29
CFR FLOW - SEPTAL: 1.81
CFR FLOW - WHOLE HEART: 1.85
CV STRESS BASE HR: 82 BPM
DIASTOLIC BLOOD PRESSURE: 71 MMHG
EJECTION FRACTION- HIGH: 59 %
END DIASTOLIC INDEX-HIGH: 155 ML/M2
END DIASTOLIC INDEX-LOW: 91 ML/M2
END SYSTOLIC INDEX-HIGH: 78 ML/M2
END SYSTOLIC INDEX-LOW: 40 ML/M2
NUC REST DIASTOLIC VOLUME INDEX: 83
NUC REST EJECTION FRACTION: 55
NUC REST SYSTOLIC VOLUME INDEX: 37
NUC STRESS DIASTOLIC VOLUME INDEX: 81
NUC STRESS EJECTION FRACTION: 72 %
NUC STRESS SYSTOLIC VOLUME INDEX: 22
OHS CV CPX 1 MINUTE RECOVERY HEART RATE: 88 BPM
OHS CV CPX 85 PERCENT MAX PREDICTED HEART RATE MALE: 132
OHS CV CPX MAX PREDICTED HEART RATE: 155
OHS CV CPX PATIENT IS FEMALE: 1
OHS CV CPX PATIENT IS MALE: 0
OHS CV CPX PEAK DIASTOLIC BLOOD PRESSURE: 38 MMHG
OHS CV CPX PEAK HEAR RATE: 80 BPM
OHS CV CPX PEAK RATE PRESSURE PRODUCT: 8480
OHS CV CPX PEAK SYSTOLIC BLOOD PRESSURE: 106 MMHG
OHS CV CPX PERCENT MAX PREDICTED HEART RATE ACHIEVED: 54
OHS CV CPX RATE PRESSURE PRODUCT PRESENTING: NORMAL
REST FLOW - ANTERIOR: 0.46 CC/MIN/G
REST FLOW - INFERIOR: 0.55 CC/MIN/G
REST FLOW - LATERAL: 0.51 CC/MIN/G
REST FLOW - MAX: 0.66 CC/MIN/G
REST FLOW - MIN: 0.34 CC/MIN/G
REST FLOW - SEPTAL: 0.48 CC/MIN/G
REST FLOW - WHOLE HEART: 0.5 CC/MIN/G
RETIRED EF AND QEF - SEE NOTES: 47 %
STRESS FLOW - ANTERIOR: 0.93 CC/MIN/G
STRESS FLOW - INFERIOR: 0.98 CC/MIN/G
STRESS FLOW - LATERAL: 0.91 CC/MIN/G
STRESS FLOW - MAX: 1.25 CC/MIN/G
STRESS FLOW - MIN: 0.55 CC/MIN/G
STRESS FLOW - SEPTAL: 0.86 CC/MIN/G
STRESS FLOW - WHOLE HEART: 0.92 CC/MIN/G
SYSTOLIC BLOOD PRESSURE: 145 MMHG

## 2023-11-06 PROCEDURE — 78431 CARDIAC PET SCAN STRESS (CUPID ONLY): ICD-10-PCS | Mod: 26,,, | Performed by: INTERNAL MEDICINE

## 2023-11-06 PROCEDURE — 78431 MYOCRD IMG PET RST&STRS CT: CPT | Mod: 26,,, | Performed by: INTERNAL MEDICINE

## 2023-11-06 PROCEDURE — 78434 AQMBF PET REST & RX STRESS: CPT

## 2023-11-06 PROCEDURE — 63600175 PHARM REV CODE 636 W HCPCS: Performed by: INTERNAL MEDICINE

## 2023-11-06 PROCEDURE — 93016 CV STRESS TEST SUPVJ ONLY: CPT | Mod: ,,, | Performed by: INTERNAL MEDICINE

## 2023-11-06 PROCEDURE — 93018 CV STRESS TEST I&R ONLY: CPT | Mod: ,,, | Performed by: INTERNAL MEDICINE

## 2023-11-06 PROCEDURE — 78434 CARDIAC PET SCAN STRESS (CUPID ONLY): ICD-10-PCS | Mod: 26,,, | Performed by: INTERNAL MEDICINE

## 2023-11-06 PROCEDURE — 93018 CARDIAC PET SCAN STRESS (CUPID ONLY): ICD-10-PCS | Mod: ,,, | Performed by: INTERNAL MEDICINE

## 2023-11-06 PROCEDURE — 93016 CARDIAC PET SCAN STRESS (CUPID ONLY): ICD-10-PCS | Mod: ,,, | Performed by: INTERNAL MEDICINE

## 2023-11-06 PROCEDURE — 78434 AQMBF PET REST & RX STRESS: CPT | Mod: 26,,, | Performed by: INTERNAL MEDICINE

## 2023-11-06 RX ORDER — REGADENOSON 0.08 MG/ML
0.4 INJECTION, SOLUTION INTRAVENOUS
Status: COMPLETED | OUTPATIENT
Start: 2023-11-06 | End: 2023-11-06

## 2023-11-06 RX ORDER — AMINOPHYLLINE 25 MG/ML
75 INJECTION, SOLUTION INTRAVENOUS
Status: COMPLETED | OUTPATIENT
Start: 2023-11-06 | End: 2023-11-06

## 2023-11-06 RX ADMIN — REGADENOSON 0.4 MG: 0.08 INJECTION, SOLUTION INTRAVENOUS at 12:11

## 2023-11-06 RX ADMIN — AMINOPHYLLINE 75 MG: 25 INJECTION, SOLUTION INTRAVENOUS at 12:11

## 2023-11-20 ENCOUNTER — OFFICE VISIT (OUTPATIENT)
Dept: CARDIOLOGY | Facility: CLINIC | Age: 63
End: 2023-11-20
Attending: INTERNAL MEDICINE
Payer: MEDICARE

## 2023-11-20 VITALS
WEIGHT: 245.81 LBS | RESPIRATION RATE: 18 BRPM | DIASTOLIC BLOOD PRESSURE: 69 MMHG | BODY MASS INDEX: 45.24 KG/M2 | SYSTOLIC BLOOD PRESSURE: 143 MMHG | HEIGHT: 62 IN

## 2023-11-20 DIAGNOSIS — G47.33 OSA (OBSTRUCTIVE SLEEP APNEA): ICD-10-CM

## 2023-11-20 DIAGNOSIS — I83.12 VARICOSE VEINS OF BOTH LOWER EXTREMITIES WITH INFLAMMATION: ICD-10-CM

## 2023-11-20 DIAGNOSIS — N18.32 STAGE 3B CHRONIC KIDNEY DISEASE: ICD-10-CM

## 2023-11-20 DIAGNOSIS — I10 ESSENTIAL (PRIMARY) HYPERTENSION: Primary | ICD-10-CM

## 2023-11-20 DIAGNOSIS — R93.1 ABNORMAL SCREENING CARDIAC CT: ICD-10-CM

## 2023-11-20 DIAGNOSIS — D64.9 NORMOCYTIC ANEMIA: ICD-10-CM

## 2023-11-20 DIAGNOSIS — I83.11 VARICOSE VEINS OF BOTH LOWER EXTREMITIES WITH INFLAMMATION: ICD-10-CM

## 2023-11-20 DIAGNOSIS — E78.00 HYPERCHOLESTEROLEMIA: ICD-10-CM

## 2023-11-20 PROCEDURE — 3008F BODY MASS INDEX DOCD: CPT | Mod: CPTII,S$GLB,, | Performed by: INTERNAL MEDICINE

## 2023-11-20 PROCEDURE — 3078F PR MOST RECENT DIASTOLIC BLOOD PRESSURE < 80 MM HG: ICD-10-PCS | Mod: CPTII,S$GLB,, | Performed by: INTERNAL MEDICINE

## 2023-11-20 PROCEDURE — 99214 PR OFFICE/OUTPT VISIT, EST, LEVL IV, 30-39 MIN: ICD-10-PCS | Mod: S$GLB,,, | Performed by: INTERNAL MEDICINE

## 2023-11-20 PROCEDURE — 3066F PR DOCUMENTATION OF TREATMENT FOR NEPHROPATHY: ICD-10-PCS | Mod: CPTII,S$GLB,, | Performed by: INTERNAL MEDICINE

## 2023-11-20 PROCEDURE — 1159F MED LIST DOCD IN RCRD: CPT | Mod: CPTII,S$GLB,, | Performed by: INTERNAL MEDICINE

## 2023-11-20 PROCEDURE — 3077F SYST BP >= 140 MM HG: CPT | Mod: CPTII,S$GLB,, | Performed by: INTERNAL MEDICINE

## 2023-11-20 PROCEDURE — 3061F NEG MICROALBUMINURIA REV: CPT | Mod: CPTII,S$GLB,, | Performed by: INTERNAL MEDICINE

## 2023-11-20 PROCEDURE — 1101F PT FALLS ASSESS-DOCD LE1/YR: CPT | Mod: CPTII,S$GLB,, | Performed by: INTERNAL MEDICINE

## 2023-11-20 PROCEDURE — 1101F PR PT FALLS ASSESS DOC 0-1 FALLS W/OUT INJ PAST YR: ICD-10-PCS | Mod: CPTII,S$GLB,, | Performed by: INTERNAL MEDICINE

## 2023-11-20 PROCEDURE — 3288F PR FALLS RISK ASSESSMENT DOCUMENTED: ICD-10-PCS | Mod: CPTII,S$GLB,, | Performed by: INTERNAL MEDICINE

## 2023-11-20 PROCEDURE — 1159F PR MEDICATION LIST DOCUMENTED IN MEDICAL RECORD: ICD-10-PCS | Mod: CPTII,S$GLB,, | Performed by: INTERNAL MEDICINE

## 2023-11-20 PROCEDURE — 4010F ACE/ARB THERAPY RXD/TAKEN: CPT | Mod: CPTII,S$GLB,, | Performed by: INTERNAL MEDICINE

## 2023-11-20 PROCEDURE — 3061F PR NEG MICROALBUMINURIA RESULT DOCUMENTED/REVIEW: ICD-10-PCS | Mod: CPTII,S$GLB,, | Performed by: INTERNAL MEDICINE

## 2023-11-20 PROCEDURE — 99214 OFFICE O/P EST MOD 30 MIN: CPT | Mod: S$GLB,,, | Performed by: INTERNAL MEDICINE

## 2023-11-20 PROCEDURE — 3052F HG A1C>EQUAL 8.0%<EQUAL 9.0%: CPT | Mod: CPTII,S$GLB,, | Performed by: INTERNAL MEDICINE

## 2023-11-20 PROCEDURE — 3066F NEPHROPATHY DOC TX: CPT | Mod: CPTII,S$GLB,, | Performed by: INTERNAL MEDICINE

## 2023-11-20 PROCEDURE — 3288F FALL RISK ASSESSMENT DOCD: CPT | Mod: CPTII,S$GLB,, | Performed by: INTERNAL MEDICINE

## 2023-11-20 PROCEDURE — 99999 PR PBB SHADOW E&M-EST. PATIENT-LVL IV: ICD-10-PCS | Mod: PBBFAC,,, | Performed by: INTERNAL MEDICINE

## 2023-11-20 PROCEDURE — 3008F PR BODY MASS INDEX (BMI) DOCUMENTED: ICD-10-PCS | Mod: CPTII,S$GLB,, | Performed by: INTERNAL MEDICINE

## 2023-11-20 PROCEDURE — 3077F PR MOST RECENT SYSTOLIC BLOOD PRESSURE >= 140 MM HG: ICD-10-PCS | Mod: CPTII,S$GLB,, | Performed by: INTERNAL MEDICINE

## 2023-11-20 PROCEDURE — 3078F DIAST BP <80 MM HG: CPT | Mod: CPTII,S$GLB,, | Performed by: INTERNAL MEDICINE

## 2023-11-20 PROCEDURE — 99999 PR PBB SHADOW E&M-EST. PATIENT-LVL IV: CPT | Mod: PBBFAC,,, | Performed by: INTERNAL MEDICINE

## 2023-11-20 PROCEDURE — 4010F PR ACE/ARB THEARPY RXD/TAKEN: ICD-10-PCS | Mod: CPTII,S$GLB,, | Performed by: INTERNAL MEDICINE

## 2023-11-20 PROCEDURE — 3052F PR MOST RECENT HEMOGLOBIN A1C LEVEL 8.0 - < 9.0%: ICD-10-PCS | Mod: CPTII,S$GLB,, | Performed by: INTERNAL MEDICINE

## 2023-11-20 NOTE — PROGRESS NOTES
Subjective:    Patient ID:  Patricia Fournier is a 63 y.o. female patient here for evaluation Follow-up (Er f/u)      History of Present Illness:  Cardiology follow-up.  Numerous risk factors include diabetes mellitus hypertension, dyslipidemia past tobacco use.  Positive family history.  Patient elevated calcium score of 184, nuclear study done with technetium at best equivocal for anterior wall ischemia.  Follow-up cardiac PET completely normal.  No ischemic changes.  EF normal.  Patient remains relatively asymptomatic.    No definite angina.  Stable dyspnea.  No PND orthopnea.  No edema.  History of chronic kidney disease.  GFR 45.  Followed by primary care             Review of patient's allergies indicates:   Allergen Reactions    Allspice Hives     Pt states allergic to pimento( which is part of [employer] family)    Pneumoc 20-aries conj-dip cr(pf) Rash     Swelling, cellulitis    Trulicity [dulaglutide] Other (See Comments)     Severe hypoglycemic and hospitalized       Past Medical History:   Diagnosis Date    a Elevated Cardiac CT Calcium Score     Dr. Saji Alford; 6/5/23 RXd ASA 81 Mg Daily; 6/19/23 Cardiac CT Ca+ Score = 184.0 (See Report)    a Family H/O Early CAD     Dr. Saji Alford; 6/5/23 RXd ASA 81 Mg Daily; Her Mother    b Hypertension     b Stage 3 Chronic Kidney Disease     c Hypercholesterolemia     d Type 2 Diabetes Mellitus On Insulin     9/1/23 Increased 1.5 Mg Trulicity To 3 Mg Weekly, With HgA1c In 4 Months; 9/1/23 Referred To [facility] EDU    f Left Adrenal Adenoma     f Morbid Obesity     6/5/23 RXd Lifestyle Changes    g Chronic Normocytic Anemia     Am Monitoring    i Obstructive Sleep Apnea     6/5/23 referred To Dr. Camelia Scott; She Cannot Tolerate Her Current CPAP    i Pulmonary Subcentimeter Nodules     No Further Evaluation Indicated; 6/19/23 Chest CT Scan W/O Contrast = (See Report)    j Family H/O Colon Cancer     Dr. Markell Penny; Her Sister    j Gastroesophageal Reflux Disease       "Markell Penny    j H/O Colon Polyps On 2019 Colonoscopy     Dr. Markell Penny; Her GI M.D.: "Repeat TC In 5 Years"    j H/O Lap Band Bariatric Surgery In      This Was Purposely Deflated By Her Surgeon    j H/O Laparoscopic Cholecystectomy     In The Remote Past    l H/O Right Shoulder Arthroscopic Surgery     l Rheumatoid Arthritis     Dr. Murali chacon Chronic Fatigue     n Generalized Anxiety And Stress     o Allergic Rhinosinusitis     q Bilateral Lower Extremity Varicose Veins     q Vitamin D Deficiency     23 RXd OTC D3 5K IU Daily    Wellness Visit 2023      Past Surgical History:   Procedure Laterality Date     SECTION      gallbladder removed      TUBAL LIGATION       Social History     Tobacco Use    Smoking status: Former     Current packs/day: 1.50     Average packs/day: 1.5 packs/day for 20.0 years (30.0 ttl pk-yrs)     Types: Cigarettes    Smokeless tobacco: Former     Quit date: 2002   Substance Use Topics    Alcohol use: No     Alcohol/week: 0.0 standard drinks of alcohol    Drug use: No        Review of Systems:    As noted in HPI in addition      REVIEW OF SYSTEMS  Review of Systems   Constitutional: Negative for decreased appetite, diaphoresis, night sweats, weight gain and weight loss.   HENT:  Negative for nosebleeds and odynophagia.    Eyes:  Negative for double vision and photophobia.   Cardiovascular:  Negative for chest pain, claudication, cyanosis, dyspnea on exertion, irregular heartbeat, leg swelling, near-syncope, orthopnea, palpitations, paroxysmal nocturnal dyspnea and syncope.   Respiratory:  Negative for cough, hemoptysis, shortness of breath and wheezing.    Hematologic/Lymphatic: Negative for adenopathy.   Skin:  Negative for flushing, skin cancer and suspicious lesions.   Musculoskeletal:  Negative for gout, myalgias and neck pain.   Gastrointestinal:  Negative for abdominal pain, heartburn, hematemesis and hematochezia.   Genitourinary:  Negative for " bladder incontinence, hesitancy and nocturia.   Neurological:  Negative for focal weakness, headaches, light-headedness and paresthesias.   Psychiatric/Behavioral:  Negative for memory loss and substance abuse.               Objective:        Vitals:    11/20/23 0958   BP: (!) 143/69   Pulse: (P) 73   Resp: 18       Lab Results   Component Value Date    WBC 5.37 07/07/2023    HGB 11.6 (L) 07/07/2023    HCT 36.4 (L) 07/07/2023     07/07/2023    CHOL 127 07/07/2023    TRIG 88 07/07/2023    HDL 59 07/07/2023    ALT 19 07/07/2023    AST 16 07/07/2023     07/07/2023    K 4.8 07/07/2023     (H) 07/07/2023    CREATININE 1.3 07/07/2023    BUN 34 (H) 07/07/2023    CO2 21 (L) 07/07/2023    TSH 2.531 07/07/2023    HGBA1C 8.1 (H) 07/07/2023        ECHOCARDIOGRAM RESULTS  Results for orders placed in visit on 08/29/23    Echo    Interpretation Summary    Left Ventricle: The left ventricle is normal in size. Mildly increased wall thickness. Normal wall motion. There is normal systolic function with a visually estimated ejection fraction of 60 - 65%. Grade I diastolic dysfunction.    Right Ventricle: Normal right ventricular cavity size. Wall thickness is normal. Right ventricle wall motion  is normal. Systolic function is normal.    Pulmonary Artery: No pulmonary hypertension. The estimated pulmonary artery systolic pressure is 21 mmHg.    IVC/SVC: Normal venous pressure at 3 mmHg.        CURRENT/PREVIOUS VISIT EKG  Results for orders placed or performed in visit on 08/23/23   IN OFFICE EKG 12-LEAD (to [place])    Collection Time: 08/23/23  9:06 AM    Narrative    Test Reason : z00.00    Vent. Rate : 073 BPM     Atrial Rate : 073 BPM     P-R Int : 192 ms          QRS Dur : 082 ms      QT Int : 392 ms       P-R-T Axes : 061 045 069 degrees     QTc Int : 431 ms    Normal sinus rhythm  Normal ECG  No previous ECGs available  Confirmed by Ileana Gavin MD ([id number]) on 8/25/2023 10:42:04 PM    Referred By: ANNA ARAMBULA    "        Confirmed By:Ileana Gavin MD     No valid procedures specified.   Results for orders placed during the hospital encounter of 08/30/23    Nuclear Stress - Cardiology Interpreted    Interpretation Summary    Abnormal myocardial perfusion scan.    There is a moderate to severe intensity, moderate sized, reversible perfusion abnormality that is consistent with ischemia in the basal to apical anterior wall(s).    There are no other significant perfusion abnormalities.    The gated perfusion images showed an ejection fraction of 72% post stress.    LV cavity size is normal at rest and normal at stress.    The ECG portion of the study is negative for ischemia.    No valid procedures specified.    PHYSICAL EXAM  CONSTITUTIONAL: Well built, well nourished in no apparent distress  NECK: no carotid bruit, no JVD  LUNGS: CTA  CHEST WALL: no tenderness,  HEART: regular rate and rhythm, S1, S2 normal, no murmur, click, rub or gallop   ABDOMEN: soft, non-tender; bowel sounds normal; no masses,  no organomegaly  EXTREMITIES: Extremities normal, no edema, no calf tenderness noted  NEURO: AAO X 3    I HAVE REVIEWED :    The vital signs, nurses notes, and all the pertinent radiology and labs.         Current Outpatient Medications   Medication Instructions    aspirin (ECOTRIN) 81 mg, Oral, Daily    BD ULTRA-FINE GEENA PEN NEEDLE 32 gauge x 5/32" Ndle Use 4x daily with insulin.    blood pressure monitor Kit 1 each, Misc.(Non-Drug; Combo Route), Daily    blood sugar diagnostic Strp To check BG 4 times daily, to use with insurance preferred meter--accuchek guide--Pt with fluctuating glucoses and freq insulin adjustments.    blood-glucose meter kit To check BG 3 times daily, to use with insurance preferred meter    cholecalciferol (vitamin D3) 5,000 Units, Oral, Daily    cyanocobalamin (vitamin B-12) 2,000 mcg, Oral, Daily    ferrous sulfate (FEOSOL) 325 mg, Oral, With breakfast    FLUoxetine 20 mg, Oral, Daily    fluticasone " propionate (FLONASE) 50 mcg/actuation nasal spray 2 sprays, Each Nostril, Daily    hydroxychloroquine (PLAQUENIL) 200 mg, Oral, 2 times daily    insulin (LANTUS SOLOSTAR U-100 INSULIN) glargine 100 units/mL SubQ pen INJECT 60 UNITS SUBCUTANEOUSLY ONCE DAILY    Lactobacillus acidophilus (PROBIOTIC ORAL) Oral    lancets Misc To check BG 3 times daily, to use with insurance preferred meter    lisinopriL (PRINIVIL,ZESTRIL) 20 mg, Oral, Daily    metFORMIN (GLUCOPHAGE-XR) 1,000 mg, Oral, 2 times daily    miscellaneous medical supply (C-TUB) Misc Diabetic shoes with full-length semi-rigid accommadative  Orthotics posting medial heel DX E11.9, [place]. Pes planus.    MOUNJARO 5 mg, Subcutaneous, Every 7 days    pantoprazole (PROTONIX) 40 mg, Oral    pravastatin (PRAVACHOL) 40 mg, Oral, Daily    predniSONE (DELTASONE) 5 mg, Oral, Daily PRN    PROCTO-MED HC 2.5 % rectal cream Rectal, 2 times daily          Assessment:   Follow-up cardiac PET normal.  Risk factors positive for hypertension, diabetes mellitus, dyslipidemia, past tobacco use, positive family history.    Chronic kidney disease stage 3, GFR 45.         Plan:   Risk factor modification via weight loss, diet, exercise.  Labs follow-up primary care.  Cardiology follow-up in 1 year.          No follow-ups on file.

## 2023-12-08 NOTE — PROGRESS NOTES
Subjective:       Patient ID: Patricia Fournier is a 62 y.o. female.    Chief Complaint: Disease Management        Follow up:  62 year old female who presents to clinic for follow up on seronegative RA, osteoarthritis, sjogren's syndrome. She has had worsening pain in the last. 2 months worse in wrist, ankle and lower back, she has am gelling 30 min. She is doing fair overall. She denies stiffness in her hands or wrists. She is taking plaquenil 200 mg twice daily and prednisone 2.5-5 mg on rare occasion for generalized flares. She complains of increased low back pain with radiation to the groin bilaterally. No urinary symptoms. She has known osteoarthritis of L4-5 and L5-S1. Pt may have taken 1 or 2 ultracet. She stopped evoxac due to increase sweating and excessive saliva.  Pt is always fatigued , not loosing weight and sleeps well.   We reviewed her recent labs.     Current tx:  1.  mg bid   2. prednisone      Review of Systems   Constitutional:  Positive for activity change and fatigue. Negative for appetite change and chills.   Eyes:  Negative for visual disturbance.   Respiratory:  Negative for cough.    Cardiovascular:  Negative for chest pain.   Gastrointestinal:  Negative for abdominal pain, constipation, diarrhea, nausea and vomiting.   Musculoskeletal:  Positive for arthralgias, back pain, joint swelling and neck pain.   Neurological:  Negative for dizziness, weakness and light-headedness.       Objective:     Vitals:    23 1444   BP: (!) 83/50   Pulse: 88       Past Medical History:   Diagnosis Date    Diabetes mellitus, type 2     GERD (gastroesophageal reflux disease)     Hypertension      Past Surgical History:   Procedure Laterality Date     SECTION      gallbladder removed      TUBAL LIGATION            Physical Exam   Constitutional: She is oriented to person, place, and time.   Eyes: Right conjunctiva is not injected. Left conjunctiva is not injected.   Neck: No JVD present.  No thyromegaly present.   Cardiovascular: Normal rate. Exam reveals no decreased pulses.   Trace bilateral lower ext edema   Pulmonary/Chest: Effort normal.   Musculoskeletal:         General: Tenderness and deformity present. No swelling.      Right shoulder: Normal.      Left shoulder: Normal.      Right elbow: Normal.      Left elbow: Normal.      Right wrist: Normal.      Left wrist: Swelling present.      Right knee: Normal.      Left knee: Normal.   Lymphadenopathy:     She has no cervical adenopathy.   Neurological: She is alert and oriented to person, place, and time. Gait normal.   Skin: No rash noted.   Psychiatric: Mood and affect normal.       Right Side Rheumatological Exam     Examination finds the shoulder, elbow, wrist, knee, 1st PIP, 1st MCP, 2nd PIP, 2nd MCP, 3rd PIP, 3rd MCP, 4th PIP, 4th MCP, 5th PIP and 5th MCP normal.    Left Side Rheumatological Exam     Examination finds the shoulder, elbow, knee, 1st PIP, 1st MCP, 2nd PIP, 2nd MCP, 3rd PIP, 3rd MCP, 4th PIP, 4th MCP, 5th PIP and 5th MCP normal.    She has swelling of the wrist      Back/Neck Exam   Tenderness Right paramedian tenderness of the Lower L-Spine and SI Joint.Left paramedian tenderness of the Lower L-Spine and SI Joint.        Collected Updated Procedure    01/18/2023 0710 01/18/2023 1336 Hemoglobin A1C [[id number]]   (Abnormal)   Blood    Component Value Units   Hemoglobin A1C 7.0 High   %   Estimated Avg Glucose 154 High  mg/dL          01/18/2023 0710 01/18/2023 1353 Sedimentation rate [[id number]]   Blood    Component Value Units   Sed Rate 5 mm/Hr          01/18/2023 0710 01/18/2023 1336 C-Reactive Protein [[id number]]   Blood    Component Value Units   CRP 0.8 mg/L          01/18/2023 0710 01/18/2023 1336 Comprehensive Metabolic Panel [[id number]]   (Abnormal)   Blood    Component Value Units   Sodium 139 mmol/L   Potassium 4.5 mmol/L   Chloride 105 mmol/L   CO2 27 mmol/L   Glucose 135 High  mg/dL   BUN 30 High  mg/dL    Creatinine 1.3 mg/dL   Calcium 9.0 mg/dL   Total Protein 6.9 g/dL   Albumin 3.8 g/dL   Total Bilirubin 0.4  mg/dL   Alkaline Phosphatase 51 Low  U/L   AST 16 U/L   ALT 17 U/L   Anion Gap 7 Low  mmol/L   eGFR 45.9 Abnormal  mL/min/1.73 m^2          01/18/2023 0710 01/18/2023 1336 CBC Auto Differential [[id number]]   (Abnormal)   Blood    Component Value Units   WBC 4.26 K/uL   RBC 3.61 Low  M/uL   Hemoglobin 10.4 Low  g/dL   Hematocrit 33.5 Low  %   MCV 93 fL   MCH 28.8 pg   MCHC 31.0 Low  g/dL   RDW 13.1 %   Platelets 256 K/uL   MPV 10.8 fL   Immature Granulocytes 0.0 %   Gran # (ANC) 2.3 K/uL   Immature Grans (Abs) 0.00  K/uL   Lymph # 1.2 K/uL   Mono # 0.5 K/uL   Eos # 0.2 K/uL   Baso # 0.03 K/uL   nRBC 0 /100 WBC   Gran % 54.5 %   Lymph % 28.4 %   Mono % 11.0 %   Eosinophil % 5.4 %   Basophil % 0.7 %   Differential Method Automated               Assessment:       1. Seronegative rheumatoid arthritis    2. Lumbar back pain    3. Cervical arthritis    4. Chronic pain syndrome    5. Sjogren's syndrome, with unspecified organ involvement                Plan:       Seronegative rheumatoid arthritis  -     tramadol-acetaminophen 37.5-325 mg (ULTRACET) 37.5-325 mg Tab; Take 1 tablet by mouth every 12 (twelve) hours as needed for Pain.  Dispense: 40 tablet; Refill: 0  -     hydrOXYchloroQUINE (PLAQUENIL) 200 mg tablet; Take 1 tablet (200 mg total) by mouth 2 (two) times daily.  Dispense: 60 tablet; Refill: 6  -     cyanocobalamin injection 1,000 mcg  -     CBC Auto Differential; Future; Expected date: 01/25/2023  -     Comprehensive Metabolic Panel; Future; Expected date: 01/25/2023  -     C-Reactive Protein; Future; Expected date: 01/25/2023  -     GRACIELA Screen w/Reflex; Future; Expected date: 01/25/2023  -     Iron and TIBC; Future; Expected date: 01/25/2023  -     Sedimentation rate; Future; Expected date: 01/25/2023  -     TSH; Future; Expected date: 01/25/2023  -     T4, Free; Future; Expected date: 01/25/2023  -      T3; Future; Expected date: 01/25/2023  -     Vitamin D; Future; Expected date: 01/25/2023  -     VITAMIN B12; Future; Expected date: 01/25/2023    Lumbar back pain  -     tramadol-acetaminophen 37.5-325 mg (ULTRACET) 37.5-325 mg Tab; Take 1 tablet by mouth every 12 (twelve) hours as needed for Pain.  Dispense: 40 tablet; Refill: 0  -     cyanocobalamin injection 1,000 mcg  -     CBC Auto Differential; Future; Expected date: 01/25/2023  -     Comprehensive Metabolic Panel; Future; Expected date: 01/25/2023  -     C-Reactive Protein; Future; Expected date: 01/25/2023  -     GRACIELA Screen w/Reflex; Future; Expected date: 01/25/2023  -     Iron and TIBC; Future; Expected date: 01/25/2023  -     Sedimentation rate; Future; Expected date: 01/25/2023  -     TSH; Future; Expected date: 01/25/2023  -     T4, Free; Future; Expected date: 01/25/2023  -     T3; Future; Expected date: 01/25/2023  -     Vitamin D; Future; Expected date: 01/25/2023  -     VITAMIN B12; Future; Expected date: 01/25/2023    Cervical arthritis  -     tramadol-acetaminophen 37.5-325 mg (ULTRACET) 37.5-325 mg Tab; Take 1 tablet by mouth every 12 (twelve) hours as needed for Pain.  Dispense: 40 tablet; Refill: 0  -     cyanocobalamin injection 1,000 mcg  -     CBC Auto Differential; Future; Expected date: 01/25/2023  -     Comprehensive Metabolic Panel; Future; Expected date: 01/25/2023  -     C-Reactive Protein; Future; Expected date: 01/25/2023  -     GRACIELA Screen w/Reflex; Future; Expected date: 01/25/2023  -     Iron and TIBC; Future; Expected date: 01/25/2023  -     Sedimentation rate; Future; Expected date: 01/25/2023  -     TSH; Future; Expected date: 01/25/2023  -     T4, Free; Future; Expected date: 01/25/2023  -     T3; Future; Expected date: 01/25/2023  -     Vitamin D; Future; Expected date: 01/25/2023  -     VITAMIN B12; Future; Expected date: 01/25/2023    Chronic pain syndrome  -     tramadol-acetaminophen 37.5-325 mg (ULTRACET) 37.5-325 mg  Tab; Take 1 tablet by mouth every 12 (twelve) hours as needed for Pain.  Dispense: 40 tablet; Refill: 0  -     cyanocobalamin injection 1,000 mcg  -     CBC Auto Differential; Future; Expected date: 01/25/2023  -     Comprehensive Metabolic Panel; Future; Expected date: 01/25/2023  -     C-Reactive Protein; Future; Expected date: 01/25/2023  -     GRACIELA Screen w/Reflex; Future; Expected date: 01/25/2023  -     Iron and TIBC; Future; Expected date: 01/25/2023  -     Sedimentation rate; Future; Expected date: 01/25/2023  -     TSH; Future; Expected date: 01/25/2023  -     T4, Free; Future; Expected date: 01/25/2023  -     T3; Future; Expected date: 01/25/2023  -     Vitamin D; Future; Expected date: 01/25/2023  -     VITAMIN B12; Future; Expected date: 01/25/2023    Sjogren's syndrome, with unspecified organ involvement  -     hydrOXYchloroQUINE (PLAQUENIL) 200 mg tablet; Take 1 tablet (200 mg total) by mouth 2 (two) times daily.  Dispense: 60 tablet; Refill: 6  -     cyanocobalamin injection 1,000 mcg  -     CBC Auto Differential; Future; Expected date: 01/25/2023  -     Comprehensive Metabolic Panel; Future; Expected date: 01/25/2023  -     C-Reactive Protein; Future; Expected date: 01/25/2023  -     GRACIELA Screen w/Reflex; Future; Expected date: 01/25/2023  -     Iron and TIBC; Future; Expected date: 01/25/2023  -     Sedimentation rate; Future; Expected date: 01/25/2023  -     TSH; Future; Expected date: 01/25/2023  -     T4, Free; Future; Expected date: 01/25/2023  -     T3; Future; Expected date: 01/25/2023  -     Vitamin D; Future; Expected date: 01/25/2023  -     VITAMIN B12; Future; Expected date: 01/25/2023            Assessment:  62 year old female with  Seronegative RA, Sjogren's syndrome, GRACIELA 1:160 homogenous on plaquenil  --osteoarthritis cervical and lumbar spine  --CKD stage 3  --type 2 diabetes with hyperglycemia, last HgbA1c 7.0%  --pulmonary nodules, restrictive lung disease followed by Pulmonary  --hx  of lap band  --normocytic anemia    Plan:  1. Cont Plaquenil 200 mg bid  2. pt will discuss w/ Linda Askew NP. Talia and or ozempic  3. Cont robaxin bid PRN  4. Stopped evoxac daily prn for dry mouth  5. Ultracet printed pt will check at home to see if she has some because she rarely takes. I have checked [place] prescription monitoring program site and no unusual or abnormal behavior has occurred pt understand the risk and benefits of taking opioid medications and has decided to continue the medication.  6. B12 sq and labs prior           0 (no pain/absence of nonverbal indicators of pain)

## 2024-01-24 PROBLEM — N18.32 STAGE 3B CHRONIC KIDNEY DISEASE: Status: ACTIVE | Noted: 2024-01-24

## 2024-01-24 PROBLEM — E11.51 TYPE 2 DIABETES MELLITUS WITH DIABETIC PERIPHERAL ANGIOPATHY WITHOUT GANGRENE, WITH LONG-TERM CURRENT USE OF INSULIN: Status: ACTIVE | Noted: 2024-01-24

## 2024-01-24 PROBLEM — Z79.4 TYPE 2 DIABETES MELLITUS WITH DIABETIC PERIPHERAL ANGIOPATHY WITHOUT GANGRENE, WITH LONG-TERM CURRENT USE OF INSULIN: Status: ACTIVE | Noted: 2024-01-24

## 2024-01-26 DIAGNOSIS — E87.5 HYPERKALEMIA: Primary | ICD-10-CM

## 2024-01-29 ENCOUNTER — TELEPHONE (OUTPATIENT)
Dept: RHEUMATOLOGY | Facility: CLINIC | Age: 64
End: 2024-01-29
Payer: MEDICARE

## 2024-01-29 NOTE — TELEPHONE ENCOUNTER
Called patient no answer unable to send my chart message  ----- Message from Areli Angulo PA-C sent at 1/26/2024  5:07 PM CST -----  Inflammation marker is slightly elevated. Blood counts are stable. Kidney function is slightly declined, but stable overall. Potassium is high. Repeat BMP.

## 2024-02-04 NOTE — TELEPHONE ENCOUNTER
----- Message from Areli Angulo PA-C sent at 1/18/2023  2:33 PM CST -----  Labs are stable. Please keep your follow up visit with Dr. Wilson as scheduled to review results in further detail.     Please follow-up with their pediatrician within 1-2 days for a follow-up check to ensure your child is improving. Return to the emergency department if your child develops vomiting and is unable to keep down any fluids, if they stop making urine, if they appear dehydrated, lethargic or difficult to arouse, if they develop difficulty breathing, or if any other new or concerning symptoms arise.

## 2024-02-27 ENCOUNTER — LAB VISIT (OUTPATIENT)
Dept: LAB | Facility: HOSPITAL | Age: 64
End: 2024-02-27
Payer: MEDICARE

## 2024-02-27 ENCOUNTER — OFFICE VISIT (OUTPATIENT)
Dept: RHEUMATOLOGY | Facility: CLINIC | Age: 64
End: 2024-02-27
Payer: MEDICARE

## 2024-02-27 VITALS
WEIGHT: 238 LBS | HEIGHT: 62 IN | HEART RATE: 90 BPM | SYSTOLIC BLOOD PRESSURE: 106 MMHG | DIASTOLIC BLOOD PRESSURE: 66 MMHG | BODY MASS INDEX: 43.79 KG/M2

## 2024-02-27 DIAGNOSIS — M17.0 PRIMARY OSTEOARTHRITIS OF BOTH KNEES: ICD-10-CM

## 2024-02-27 DIAGNOSIS — M06.00 SERONEGATIVE RHEUMATOID ARTHRITIS: Primary | ICD-10-CM

## 2024-02-27 DIAGNOSIS — Z79.899 HIGH RISK MEDICATION USE: ICD-10-CM

## 2024-02-27 DIAGNOSIS — G89.4 CHRONIC PAIN SYNDROME: ICD-10-CM

## 2024-02-27 DIAGNOSIS — E87.5 HYPERKALEMIA: ICD-10-CM

## 2024-02-27 DIAGNOSIS — M35.00 SJOGREN'S SYNDROME, WITH UNSPECIFIED ORGAN INVOLVEMENT: ICD-10-CM

## 2024-02-27 DIAGNOSIS — M06.00 SERONEGATIVE RHEUMATOID ARTHRITIS: ICD-10-CM

## 2024-02-27 LAB
ALBUMIN SERPL BCP-MCNC: 3.9 G/DL (ref 3.5–5.2)
ALP SERPL-CCNC: 49 U/L (ref 55–135)
ALT SERPL W/O P-5'-P-CCNC: 16 U/L (ref 10–44)
ANION GAP SERPL CALC-SCNC: 12 MMOL/L (ref 8–16)
ANION GAP SERPL CALC-SCNC: 12 MMOL/L (ref 8–16)
AST SERPL-CCNC: 16 U/L (ref 10–40)
BILIRUB SERPL-MCNC: 0.4 MG/DL (ref 0.1–1)
BUN SERPL-MCNC: 27 MG/DL (ref 8–23)
BUN SERPL-MCNC: 27 MG/DL (ref 8–23)
CALCIUM SERPL-MCNC: 9.8 MG/DL (ref 8.7–10.5)
CALCIUM SERPL-MCNC: 9.8 MG/DL (ref 8.7–10.5)
CHLORIDE SERPL-SCNC: 108 MMOL/L (ref 95–110)
CHLORIDE SERPL-SCNC: 108 MMOL/L (ref 95–110)
CO2 SERPL-SCNC: 20 MMOL/L (ref 23–29)
CO2 SERPL-SCNC: 20 MMOL/L (ref 23–29)
CREAT SERPL-MCNC: 1.5 MG/DL (ref 0.5–1.4)
CREAT SERPL-MCNC: 1.5 MG/DL (ref 0.5–1.4)
EST. GFR  (NO RACE VARIABLE): 38.4 ML/MIN/1.73 M^2
EST. GFR  (NO RACE VARIABLE): 38.4 ML/MIN/1.73 M^2
GLUCOSE SERPL-MCNC: 147 MG/DL (ref 70–110)
GLUCOSE SERPL-MCNC: 147 MG/DL (ref 70–110)
POTASSIUM SERPL-SCNC: 5 MMOL/L (ref 3.5–5.1)
POTASSIUM SERPL-SCNC: 5 MMOL/L (ref 3.5–5.1)
PROT SERPL-MCNC: 7.2 G/DL (ref 6–8.4)
SODIUM SERPL-SCNC: 140 MMOL/L (ref 136–145)
SODIUM SERPL-SCNC: 140 MMOL/L (ref 136–145)

## 2024-02-27 PROCEDURE — 1125F AMNT PAIN NOTED PAIN PRSNT: CPT | Mod: CPTII,S$GLB,, | Performed by: PHYSICIAN ASSISTANT

## 2024-02-27 PROCEDURE — 1159F MED LIST DOCD IN RCRD: CPT | Mod: CPTII,S$GLB,, | Performed by: PHYSICIAN ASSISTANT

## 2024-02-27 PROCEDURE — 80053 COMPREHEN METABOLIC PANEL: CPT | Performed by: PHYSICIAN ASSISTANT

## 2024-02-27 PROCEDURE — 36415 COLL VENOUS BLD VENIPUNCTURE: CPT | Mod: PO | Performed by: PHYSICIAN ASSISTANT

## 2024-02-27 PROCEDURE — 99999 PR PBB SHADOW E&M-EST. PATIENT-LVL IV: CPT | Mod: PBBFAC,,, | Performed by: PHYSICIAN ASSISTANT

## 2024-02-27 PROCEDURE — 3044F HG A1C LEVEL LT 7.0%: CPT | Mod: CPTII,S$GLB,, | Performed by: PHYSICIAN ASSISTANT

## 2024-02-27 PROCEDURE — 3008F BODY MASS INDEX DOCD: CPT | Mod: CPTII,S$GLB,, | Performed by: PHYSICIAN ASSISTANT

## 2024-02-27 PROCEDURE — 3074F SYST BP LT 130 MM HG: CPT | Mod: CPTII,S$GLB,, | Performed by: PHYSICIAN ASSISTANT

## 2024-02-27 PROCEDURE — 99214 OFFICE O/P EST MOD 30 MIN: CPT | Mod: S$GLB,,, | Performed by: PHYSICIAN ASSISTANT

## 2024-02-27 PROCEDURE — 4010F ACE/ARB THERAPY RXD/TAKEN: CPT | Mod: CPTII,S$GLB,, | Performed by: PHYSICIAN ASSISTANT

## 2024-02-27 PROCEDURE — 3078F DIAST BP <80 MM HG: CPT | Mod: CPTII,S$GLB,, | Performed by: PHYSICIAN ASSISTANT

## 2024-02-27 RX ORDER — HYDROXYCHLOROQUINE SULFATE 200 MG/1
200 TABLET, FILM COATED ORAL 2 TIMES DAILY
Qty: 60 TABLET | Refills: 11 | Status: SHIPPED | OUTPATIENT
Start: 2024-02-27

## 2024-02-27 ASSESSMENT — ROUTINE ASSESSMENT OF PATIENT INDEX DATA (RAPID3)
TOTAL RAPID3 SCORE: 1.55
PSYCHOLOGICAL DISTRESS SCORE: 0
PATIENT GLOBAL ASSESSMENT SCORE: 0.5
PAIN SCORE: 1.5
MDHAQ FUNCTION SCORE: 0.8
FATIGUE SCORE: 0

## 2024-02-27 NOTE — PROGRESS NOTES
Subjective:       Patient ID: Patricia Fournier is a 63 y.o. female.    Chief Complaint: Disease Management        Mrs. Fournier is a 63 year old female who presents to clinic for follow up on seronegative RA, osteoarthritis, sjogren's syndrome. She is doing fair overall. She is taking plaquenil 200 mg twice daily. No recent flares of peripheral joint pain.     She complains of increased low back pain with moving from seated to standing, but she states pain improves with activity. She has known osteoarthritis of L4-5 and L5-S1. No radicular pain. No lower extremity weakness. She is taking ultracet on rare occasion for severe back pain.    We reviewed her recent labs-- potassium is elevated. HgbA1c is improving 6.9%. She has lost 10 lbs on mounjaro. CRP was elevated; however, pt was ill at time of lab draw.    Current tx:  1.  mg bid         Review of Systems   Constitutional:  Negative for activity change, appetite change, chills, fatigue and fever.   Eyes:  Negative for visual disturbance.   Respiratory:  Negative for cough.    Cardiovascular:  Negative for chest pain.   Gastrointestinal:  Negative for abdominal pain, constipation, diarrhea, nausea and vomiting.   Musculoskeletal:  Positive for arthralgias, back pain, joint swelling and neck pain.   Neurological:  Negative for dizziness, weakness, light-headedness and headaches.         Objective:     Vitals:    02/27/24 0944   BP: 106/66   Pulse: 90       Past Medical History:   Diagnosis Date    a Elevated Cardiac CT Calcium Score     Dr. Saji Alford; 6/5/23 RXd ASA 81 Mg Daily; 6/19/23 Cardiac CT Ca+ Score = 184.0 (See Report)    a Family H/O Early CAD     Dr. Saji Alford; 6/5/23 RXd ASA 81 Mg Daily; Her Mother    b Hypertension     b Stage 3 Chronic Kidney Disease     c Hypercholesterolemia     d Type 2 Diabetes Mellitus On Insulin     9/1/23 Increased 1.5 Mg Trulicity To 3 Mg Weekly, With HgA1c In 4 Months; 9/1/23 Referred To [facility]    f Left Adrenal  "Adenoma     f Morbid Obesity     23 RXd Lifestyle Changes    g Chronic Normocytic Anemia     Am Monitoring    i Obstructive Sleep Apnea     23 referred To Dr. Camelia Scott; She Cannot Tolerate Her Current CPAP    i Pulmonary Subcentimeter Nodules     No Further Evaluation Indicated; 23 Chest CT Scan W/O Contrast = (See Report)    j Family H/O Colon Cancer     Dr. Markell Penny; Her Sister    j Gastroesophageal Reflux Disease     Dr. Markell Penny    j H/O Colon Polyps On 2019 Colonoscopy     Dr. Markell Penny; Her GI M.D.: "Repeat TC In 5 Years"    j H/O Lap Band Bariatric Surgery In      This Was Purposely Deflated By Her Surgeon    j H/O Laparoscopic Cholecystectomy     In The Remote Past    l H/O Right Shoulder Arthroscopic Surgery     l Rheumatoid Arthritis     Dr. Murali chacon Chronic Fatigue     n Generalized Anxiety And Stress     o Allergic Rhinosinusitis     q Bilateral Lower Extremity Varicose Veins     q Vitamin D Deficiency     23 RXd OTC D3 5K IU Daily    Wellness Visit 2023      Past Surgical History:   Procedure Laterality Date     SECTION      gallbladder removed      TUBAL LIGATION            Physical Exam   Constitutional: She is oriented to person, place, and time.   Eyes: Right conjunctiva is not injected. Left conjunctiva is not injected.   Neck: No JVD present. No thyromegaly present.   Cardiovascular: Normal rate. Exam reveals no decreased pulses.   Pulmonary/Chest: Effort normal.   Musculoskeletal:         General: No swelling.      Right shoulder: Normal.      Left shoulder: Normal.      Right elbow: Normal.      Left elbow: Normal.      Right wrist: Normal.      Left wrist: Normal.      Right knee: Normal.      Left knee: Normal.   Lymphadenopathy:     She has no cervical adenopathy.   Neurological: She is alert and oriented to person, place, and time. Gait normal.   Skin: No rash noted.   Psychiatric: Mood and affect normal.       Right Side " Rheumatological Exam     Examination finds the shoulder, elbow, wrist, knee, 1st PIP, 1st MCP, 2nd PIP, 2nd MCP, 3rd PIP, 3rd MCP, 4th PIP, 4th MCP, 5th PIP and 5th MCP normal.    Left Side Rheumatological Exam     Examination finds the shoulder, elbow, wrist, knee, 1st PIP, 1st MCP, 2nd PIP, 2nd MCP, 3rd PIP, 3rd MCP, 4th PIP, 4th MCP, 5th PIP and 5th MCP normal.      Back/Neck Exam   Tenderness Right paramedian tenderness of the Lower L-Spine and SI Joint.Left paramedian tenderness of the Lower L-Spine and SI Joint.        Recent labs reviewed:  Component      Latest Ref Rng 1/15/2024   WBC      3.90 - 12.70 K/uL 4.95    RBC      4.00 - 5.40 M/uL 3.74 (L)    Hemoglobin      12.0 - 16.0 g/dL 11.4 (L)    Hematocrit      37.0 - 48.5 % 35.0 (L)    MCV      82 - 98 fL 94    MCH      27.0 - 31.0 pg 30.5    MCHC      32.0 - 36.0 g/dL 32.6    RDW      11.5 - 14.5 % 12.6    Platelet Count      150 - 450 K/uL 238    MPV      9.2 - 12.9 fL 10.5    Immature Granulocytes      0.0 - 0.5 % 0.4    Gran # (ANC)      1.8 - 7.7 K/uL 2.7    Immature Grans (Abs)      0.00 - 0.04 K/uL 0.02    Lymph #      1.0 - 4.8 K/uL 1.3    Mono #      0.3 - 1.0 K/uL 0.5    Eos #      0.0 - 0.5 K/uL 0.4    Baso #      0.00 - 0.20 K/uL 0.03    nRBC      0 /100 WBC 0    Gran %      38.0 - 73.0 % 54.3    Lymph %      18.0 - 48.0 % 26.3    Mono %      4.0 - 15.0 % 9.9    Eos %      0.0 - 8.0 % 8.5 (H)    Basophil %      0.0 - 1.9 % 0.6    Differential Method Automated    Sodium      136 - 145 mmol/L 140    Potassium      3.5 - 5.1 mmol/L 5.4 (H)    Chloride      95 - 110 mmol/L 105    CO2      22 - 31 mmol/L 25    Glucose      70 - 110 mg/dL 148 (H)    BUN      7 - 18 mg/dL 24 (H)    Creatinine      0.50 - 1.40 mg/dL 1.19    Calcium      8.4 - 10.2 mg/dL 10.1    PROTEIN TOTAL      6.0 - 8.4 g/dL 6.7    Albumin      3.5 - 5.2 g/dL 4.0    BILIRUBIN TOTAL      0.2 - 1.3 mg/dL 0.3    ALP      38 - 145 U/L 55    AST      14 - 36 U/L 25    ALT      0 - 35 U/L  20    Anion Gap      5 - 12 mmol/L 10    eGFR      >60 mL/min/1.73 m^2 51 !    Iron      30 - 160 ug/dL 66    TIBC      265 - 497 ug/dL 336    Iron Saturation      20 - 50 % 20    Sed Rate      0 - 29 mm/Hr 24    CRP      0.00 - 0.90 mg/dL 2.10 (H)    Thyroglobulin Ab Screen      0.0 - 4.0 IU/mL <0.9    Free T4      0.78 - 2.19 ng/dL 1.02    Thyroperoxidase Antibodies      0.0 - 9.0 IU/mL 0.7    TSH      0.400 - 4.000 uIU/mL 2.330    T3, Free      2.77 - 5.27 pg/mL 4.03    Transferrin      200 - 360 mg/dL 259    Zinc, Serum-ALT      60.0 - 120.0 ug/dL 76.5    PTH      9.0 - 77.0 pg/mL 16.0    Magnesium       1.6 - 2.6 mg/dL 1.8    Phosphorus Level      2.7 - 4.5 mg/dL 5.3 (H)    Thiamine      70 - 180 nmol/L 81         [id number] Routine     Narrative & Impression  EXAMINATION:  DXA BONE DENSITY AXIAL SKELETON 1 OR MORE SITES     CLINICAL HISTORY:  Encounter for screening for osteoporosis     TECHNIQUE:  DXA scanning was performed over the bilateral hip and lumbar spine.  Review of the images confirms satisfactory positioning and technique.     COMPARISON:  None     FINDINGS:  The L1 to L4 vertebral bone mineral density is equal to 1.139 g/cm squared with a T score of -0.4.     The total hip bone mineral density is equal to 0.971 g/cm squared with a T score of -0.3.     There is a 9.1% risk of a major osteoporotic fracture and a 0.7% risk of hip fracture in the next 10 years (FRAX).     Impression:     Normal bone mineral density  Assessment:       1. Seronegative rheumatoid arthritis    2. Sjogren's syndrome, with unspecified organ involvement    3. Hyperkalemia    4. High risk medication use    5. Chronic pain syndrome                  Plan:       Seronegative rheumatoid arthritis  -     hydroxychloroquine (PLAQUENIL) 200 mg tablet; Take 1 tablet (200 mg total) by mouth 2 (two) times daily.  Dispense: 60 tablet; Refill: 11  -     CBC Auto Differential; Future; Expected date: 2024  -      Comprehensive Metabolic Panel; Future; Expected date: 02/27/2024  -     C-Reactive Protein; Future; Expected date: 02/27/2024  -     Sedimentation rate; Future; Expected date: 02/27/2024    Sjogren's syndrome, with unspecified organ involvement  -     hydroxychloroquine (PLAQUENIL) 200 mg tablet; Take 1 tablet (200 mg total) by mouth 2 (two) times daily.  Dispense: 60 tablet; Refill: 11  -     CBC Auto Differential; Future; Expected date: 02/27/2024  -     Comprehensive Metabolic Panel; Future; Expected date: 02/27/2024  -     C-Reactive Protein; Future; Expected date: 02/27/2024  -     Sedimentation rate; Future; Expected date: 02/27/2024    Hyperkalemia    High risk medication use  -     hydroxychloroquine (PLAQUENIL) 200 mg tablet; Take 1 tablet (200 mg total) by mouth 2 (two) times daily.  Dispense: 60 tablet; Refill: 11    Chronic pain syndrome  -     hydroxychloroquine (PLAQUENIL) 200 mg tablet; Take 1 tablet (200 mg total) by mouth 2 (two) times daily.  Dispense: 60 tablet; Refill: 11            Assessment:  63 year old female with  Seronegative RA, Sjogren's syndrome, GRACIELA 1:160 homogenous on plaquenil  --osteoarthritis cervical and lumbar spine  --CKD stage 3  --uncontrolled type 2 diabetes with hyperglycemia, improving,last HgbA1c 6.9%  --pulmonary nodules, restrictive lung disease followed by Pulmonary  --hx of lap band  --normocytic anemia, improving    Plan:  1. Cont Plaquenil 200 mg bid. Discussed eye exam.   2. Cont prednisone 2.5 mg prn flares  3. Repeat bmp today  4. Cont ultracet prn. Pended refill to Dr. Wilson  I have checked [place] prescription monitoring program site and no unusual or abnormal behavior has occurred pt understand the risk and benefits of taking opioid medications and has decided to continue the medication.      Avoid nsaids due to CKD      Follow up:  5 months w/labs prior

## 2024-02-28 RX ORDER — TRAMADOL HYDROCHLORIDE AND ACETAMINOPHEN 37.5; 325 MG/1; MG/1
1 TABLET, FILM COATED ORAL EVERY 6 HOURS PRN
Qty: 120 TABLET | Refills: 0 | Status: SHIPPED | OUTPATIENT
Start: 2024-02-28

## 2024-03-06 ENCOUNTER — TELEPHONE (OUTPATIENT)
Dept: RHEUMATOLOGY | Facility: CLINIC | Age: 64
End: 2024-03-06
Payer: MEDICARE

## 2024-03-06 NOTE — TELEPHONE ENCOUNTER
Called patient informed her of lab results understanding verbalized  ----- Message from Areli Angulo PA-C sent at 2/29/2024 11:00 AM CST -----  Potassium is normal, but kidney function is slightly declined. Please stay hydrated and follow up with Dr. Hernandez for your kidneys.

## 2024-03-14 PROBLEM — R23.2 FACIAL FLUSHING: Status: ACTIVE | Noted: 2024-03-14

## 2024-03-14 PROBLEM — N18.30 STAGE III CHRONIC KIDNEY DISEASE: Status: RESOLVED | Noted: 2023-06-05 | Resolved: 2024-03-14

## 2024-04-18 PROBLEM — K59.04 CHRONIC IDIOPATHIC CONSTIPATION: Status: ACTIVE | Noted: 2024-04-18

## 2024-05-05 NOTE — PROGRESS NOTES
Subjective:       Patient ID: Patricia Fournier is a 60 y.o. female.    Chief Complaint: Disease Management      Mrs. Fournier is a 60 year old female who presents to clinic for follow up on seronegative RA, osteoarthritis, sjogren's syndrome. She has been doing poorly for the last month and has noticed increased pain and stiffness in her hands, wrists, and ankles. She ran out of plaquenil and did not call for a refill because she didn't know if she was supposed to continue this medication chronically. She has been taking tylenol which does provide some pain relief as well as mobic 15 mg daily. She has chronic neck and low back pain, but this is minimal compared to her peripheral joint pain. We reviewed recent labs.     Current tx:  1.  mg bid (ran out a few months ago)  2. mobic 15 mg daily    Review of Systems   Constitutional: Positive for fatigue. Negative for activity change, appetite change, chills and fever.   Eyes: Negative for visual disturbance.   Respiratory: Negative for cough.    Cardiovascular: Negative for chest pain.   Gastrointestinal: Negative for abdominal pain, constipation, diarrhea, nausea and vomiting.   Musculoskeletal: Positive for arthralgias, back pain, joint swelling and neck pain.   Neurological: Negative for dizziness, weakness, light-headedness and headaches.         Objective:     Vitals:    20 0838   BP: 129/75   Pulse: 81       Past Medical History:   Diagnosis Date    Diabetes mellitus, type 2     GERD (gastroesophageal reflux disease)     Hypertension      Past Surgical History:   Procedure Laterality Date     SECTION      gallbladder removed      TUBAL LIGATION            Physical Exam   Constitutional: She is oriented to person, place, and time and well-developed, well-nourished, and in no distress.   Eyes: Right conjunctiva is not injected. Left conjunctiva is not injected. Right eye exhibits normal extraocular motion. Left eye exhibits normal extraocular  motion.   Neck: No JVD present. No thyromegaly present.   Cardiovascular: Normal rate.  Exam reveals no decreased pulses.    Trace bilateral lower ext edema   Pulmonary/Chest: Effort normal.       Right Side Rheumatological Exam     Examination finds the shoulder, elbow, knee, 1st PIP, 1st MCP, 2nd PIP, 2nd MCP, 3rd PIP, 3rd MCP, 4th PIP, 4th MCP, 5th PIP and 5th MCP normal.    The patient is tender to palpation of the wrist and ankle    She has swelling of the wrist and ankle    Left Side Rheumatological Exam     Examination finds the shoulder, elbow, knee, 1st PIP, 1st MCP, 2nd PIP, 2nd MCP, 3rd PIP, 3rd MCP, 4th PIP, 4th MCP, 5th PIP and 5th MCP normal.    The patient is tender to palpation of the wrist and ankle.    She has swelling of the wrist and knee      Lymphadenopathy:     She has no cervical adenopathy.   Neurological: She is alert and oriented to person, place, and time. Gait normal.   Skin: No rash noted.     Psychiatric: Mood and affect normal.       Recent labs reviewed:  Component      Latest Ref Rng & Units 11/11/2020 11/11/2020          11:46 AM 11:46 AM   WBC      3.90 - 12.70 K/uL  5.19   RBC      4.00 - 5.40 M/uL  3.75 (L)   Hemoglobin      12.0 - 16.0 g/dL  11.8 (L)   Hematocrit      37.0 - 48.5 %  36.7 (L)   MCV      82 - 98 fL  98   MCH      27.0 - 31.0 pg  31.5 (H)   MCHC      32.0 - 36.0 g/dL  32.2   RDW      11.5 - 14.5 %  12.4   Platelets      150 - 350 K/uL  234   MPV      9.2 - 12.9 fL  10.8   Immature Granulocytes      0.0 - 0.5 %  0.2   Gran # (ANC)      1.8 - 7.7 K/uL  2.9   Immature Grans (Abs)      0.00 - 0.04 K/uL  0.01   Lymph #      1.0 - 4.8 K/uL  1.4   Mono #      0.3 - 1.0 K/uL  0.6   Eos #      0.0 - 0.5 K/uL  0.3   Baso #      0.00 - 0.20 K/uL  0.03   nRBC      0 /100 WBC  0   Gran %      38.0 - 73.0 %  55.6   Lymph %      18.0 - 48.0 %  26.6   Mono %      4.0 - 15.0 %  10.6   Eosinophil %      0.0 - 8.0 %  6.4   Basophil %      0.0 - 1.9 %  0.6   Differential Method         Automated   Sodium      136 - 145 mmol/L  139   Potassium      3.5 - 5.1 mmol/L  5.2 (H)   Chloride      95 - 110 mmol/L  104   CO2      23 - 29 mmol/L  27   Glucose      70 - 110 mg/dL  186 (H)   BUN      8 - 23 mg/dL  37 (H)   Creatinine      0.5 - 1.4 mg/dL  1.2   Calcium      8.7 - 10.5 mg/dL  9.3   PROTEIN TOTAL      6.0 - 8.4 g/dL  6.9   Albumin      3.5 - 5.2 g/dL  4.0   BILIRUBIN TOTAL      0.1 - 1.0 mg/dL  0.3   Alkaline Phosphatase      55 - 135 U/L  56   AST      10 - 40 U/L  26   ALT      10 - 44 U/L  33   Anion Gap      8 - 16 mmol/L  8   eGFR if African American      >60 mL/min/1.73 m:2  56.0 (A)   eGFR if non African American      >60 mL/min/1.73 m:2  48.6 (A)   Anti Sm Antibody      0.00 - 0.99 Ratio 0.07    Anti-Sm Interpretation      Negative Negative    Anti-SSA Antibody      0.00 - 0.99 Ratio 0.05 0.05   Anti-SSA Interpretation      Negative Negative Negative   Anti-SSB Antibody      0.00 - 0.99 Ratio 0.07 0.07   Anti-SSB Interpretation      Negative Negative Negative   ds DNA Ab      Negative 1:10 Negative 1:10    Anti Sm/RNP Antibody      0.00 - 0.99 Ratio 0.06    Anti-Sm/RNP Interpretation      Negative Negative    GRACIELA Screen      Negative <1:80  Positive (A)   TSH      0.400 - 4.000 uIU/mL  1.400   Free T4      0.71 - 1.51 ng/dL  0.77   T3, Free      2.3 - 4.2 pg/mL  2.3   CRP      0.0 - 8.2 mg/L  1.3   Sed Rate      0 - 20 mm/Hr  7   GRACIELA PATTERN 1        Homogeneous   GRACIELA Titer 1        1:80       Assessment:       1. Seronegative rheumatoid arthritis    2. Sjogren's syndrome, with unspecified organ involvement    3. Cervical arthritis            Plan:       Seronegative rheumatoid arthritis  -     meloxicam (MOBIC) 7.5 MG tablet; Take 1 tablet (7.5 mg total) by mouth once daily.  Dispense: 30 tablet; Refill: 6  -     hydrOXYchloroQUINE (PLAQUENIL) 200 mg tablet; Take 1 tablet (200 mg total) by mouth 2 (two) times daily.  Dispense: 60 tablet; Refill: 6  -     CBC Auto Differential; Future;  Expected date: 11/18/2020  -     Comprehensive Metabolic Panel; Future; Expected date: 11/18/2020  -     C-Reactive Protein; Future; Expected date: 11/18/2020  -     Sedimentation rate; Future; Expected date: 11/18/2020    Sjogren's syndrome, with unspecified organ involvement  -     meloxicam (MOBIC) 7.5 MG tablet; Take 1 tablet (7.5 mg total) by mouth once daily.  Dispense: 30 tablet; Refill: 6  -     hydrOXYchloroQUINE (PLAQUENIL) 200 mg tablet; Take 1 tablet (200 mg total) by mouth 2 (two) times daily.  Dispense: 60 tablet; Refill: 6  -     CBC Auto Differential; Future; Expected date: 11/18/2020  -     Comprehensive Metabolic Panel; Future; Expected date: 11/18/2020  -     C-Reactive Protein; Future; Expected date: 11/18/2020  -     Sedimentation rate; Future; Expected date: 11/18/2020    Cervical arthritis  -     CBC Auto Differential; Future; Expected date: 11/18/2020  -     Comprehensive Metabolic Panel; Future; Expected date: 11/18/2020  -     C-Reactive Protein; Future; Expected date: 11/18/2020  -     Sedimentation rate; Future; Expected date: 11/18/2020        Assessment:  60 year old female with  Seronegative RA, Sjogren's syndrome, GRACIELA 1:80 homogenous on plaquenil  --osteoarthritis cervical and lumbar spine  --CKD stage 3  --uncontrolled type 2 diabetes with hyperglycemia, last HgbA1c 8%-  --pulmonary nodules, restrictive lung disease followed by Pulmonary  --hx of lap band    Plan:  1. Restart Plaquenil 200 mg bid  2. Decrease mobic 7.5 mg daily  3. Consider PT for neck/lumbar spine arthritis in the future    The patient is aware that there is a COVID-19 pandemic and that the immunosuppressants being taken to treat arthritis can increased the  risk for infection/Viruses.  This patient understands  to hold (not to take) all DMARD/Immunsuppressants with the exception of Plaquenil,  if having fever chills, cough, shortness of breath loss of sense of smell and or myalgias.  It is understood that the  patient is to call the office as well as call their primary care physician and observe  social isolation    Follow up:  3 months w/routine labs prior       155

## 2024-05-17 PROBLEM — R23.2 FACIAL FLUSHING: Status: RESOLVED | Noted: 2024-03-14 | Resolved: 2024-05-17

## 2024-09-09 ENCOUNTER — LAB VISIT (OUTPATIENT)
Dept: LAB | Facility: HOSPITAL | Age: 64
End: 2024-09-09
Attending: PHYSICIAN ASSISTANT
Payer: MEDICARE

## 2024-09-09 DIAGNOSIS — M06.00 SERONEGATIVE RHEUMATOID ARTHRITIS: ICD-10-CM

## 2024-09-09 DIAGNOSIS — M35.00 SJOGREN'S SYNDROME, WITH UNSPECIFIED ORGAN INVOLVEMENT: ICD-10-CM

## 2024-09-09 DIAGNOSIS — M17.0 PRIMARY OSTEOARTHRITIS OF BOTH KNEES: ICD-10-CM

## 2024-09-09 DIAGNOSIS — G89.4 CHRONIC PAIN SYNDROME: ICD-10-CM

## 2024-09-09 LAB
ALBUMIN SERPL BCP-MCNC: 3.7 G/DL (ref 3.5–5.2)
ALP SERPL-CCNC: 46 U/L (ref 55–135)
ALT SERPL W/O P-5'-P-CCNC: 19 U/L (ref 10–44)
ANION GAP SERPL CALC-SCNC: 9 MMOL/L (ref 8–16)
AST SERPL-CCNC: 21 U/L (ref 10–40)
BASOPHILS # BLD AUTO: 0.06 K/UL (ref 0–0.2)
BASOPHILS NFR BLD: 1.2 % (ref 0–1.9)
BILIRUB SERPL-MCNC: 0.4 MG/DL (ref 0.1–1)
BUN SERPL-MCNC: 28 MG/DL (ref 8–23)
CALCIUM SERPL-MCNC: 9.9 MG/DL (ref 8.7–10.5)
CHLORIDE SERPL-SCNC: 104 MMOL/L (ref 95–110)
CO2 SERPL-SCNC: 26 MMOL/L (ref 23–29)
CREAT SERPL-MCNC: 1.5 MG/DL (ref 0.5–1.4)
CRP SERPL-MCNC: 1.6 MG/L (ref 0–8.2)
DIFFERENTIAL METHOD BLD: NORMAL
EOSINOPHIL # BLD AUTO: 0.3 K/UL (ref 0–0.5)
EOSINOPHIL NFR BLD: 6.2 % (ref 0–8)
ERYTHROCYTE [DISTWIDTH] IN BLOOD BY AUTOMATED COUNT: 12.7 % (ref 11.5–14.5)
ERYTHROCYTE [SEDIMENTATION RATE] IN BLOOD BY PHOTOMETRIC METHOD: 18 MM/HR (ref 0–36)
EST. GFR  (NO RACE VARIABLE): 38.2 ML/MIN/1.73 M^2
GLUCOSE SERPL-MCNC: 154 MG/DL (ref 70–110)
HCT VFR BLD AUTO: 38.3 % (ref 37–48.5)
HGB BLD-MCNC: 12.5 G/DL (ref 12–16)
IMM GRANULOCYTES # BLD AUTO: 0.02 K/UL (ref 0–0.04)
IMM GRANULOCYTES NFR BLD AUTO: 0.4 % (ref 0–0.5)
LYMPHOCYTES # BLD AUTO: 1.3 K/UL (ref 1–4.8)
LYMPHOCYTES NFR BLD: 25.4 % (ref 18–48)
MCH RBC QN AUTO: 30.7 PG (ref 27–31)
MCHC RBC AUTO-ENTMCNC: 32.6 G/DL (ref 32–36)
MCV RBC AUTO: 94 FL (ref 82–98)
MONOCYTES # BLD AUTO: 0.6 K/UL (ref 0.3–1)
MONOCYTES NFR BLD: 10.7 % (ref 4–15)
NEUTROPHILS # BLD AUTO: 2.9 K/UL (ref 1.8–7.7)
NEUTROPHILS NFR BLD: 56.1 % (ref 38–73)
NRBC BLD-RTO: 0 /100 WBC
PLATELET # BLD AUTO: 217 K/UL (ref 150–450)
PMV BLD AUTO: 11.2 FL (ref 9.2–12.9)
POTASSIUM SERPL-SCNC: 4.3 MMOL/L (ref 3.5–5.1)
PROT SERPL-MCNC: 6.9 G/DL (ref 6–8.4)
RBC # BLD AUTO: 4.07 M/UL (ref 4–5.4)
SODIUM SERPL-SCNC: 139 MMOL/L (ref 136–145)
WBC # BLD AUTO: 5.15 K/UL (ref 3.9–12.7)

## 2024-09-09 PROCEDURE — 86140 C-REACTIVE PROTEIN: CPT | Performed by: PHYSICIAN ASSISTANT

## 2024-09-09 PROCEDURE — 36415 COLL VENOUS BLD VENIPUNCTURE: CPT | Mod: PO | Performed by: PHYSICIAN ASSISTANT

## 2024-09-09 PROCEDURE — 85025 COMPLETE CBC W/AUTO DIFF WBC: CPT | Performed by: PHYSICIAN ASSISTANT

## 2024-09-09 PROCEDURE — 85652 RBC SED RATE AUTOMATED: CPT | Performed by: PHYSICIAN ASSISTANT

## 2024-09-09 PROCEDURE — 80053 COMPREHEN METABOLIC PANEL: CPT | Performed by: PHYSICIAN ASSISTANT

## 2024-09-18 ENCOUNTER — OFFICE VISIT (OUTPATIENT)
Dept: RHEUMATOLOGY | Facility: CLINIC | Age: 64
End: 2024-09-18
Payer: MEDICARE

## 2024-09-18 VITALS
WEIGHT: 237.88 LBS | BODY MASS INDEX: 43.77 KG/M2 | SYSTOLIC BLOOD PRESSURE: 114 MMHG | HEIGHT: 62 IN | HEART RATE: 74 BPM | DIASTOLIC BLOOD PRESSURE: 68 MMHG

## 2024-09-18 DIAGNOSIS — M25.531 WRIST PAIN, CHRONIC, RIGHT: ICD-10-CM

## 2024-09-18 DIAGNOSIS — G89.29 CHRONIC PAIN OF LEFT ANKLE: ICD-10-CM

## 2024-09-18 DIAGNOSIS — Z79.4 TYPE 2 DIABETES MELLITUS WITH OTHER SPECIFIED COMPLICATION, WITH LONG-TERM CURRENT USE OF INSULIN: ICD-10-CM

## 2024-09-18 DIAGNOSIS — M17.0 PRIMARY OSTEOARTHRITIS OF BOTH KNEES: ICD-10-CM

## 2024-09-18 DIAGNOSIS — E11.69 TYPE 2 DIABETES MELLITUS WITH OTHER SPECIFIED COMPLICATION, WITH LONG-TERM CURRENT USE OF INSULIN: ICD-10-CM

## 2024-09-18 DIAGNOSIS — M35.00 SJOGREN'S SYNDROME, WITH UNSPECIFIED ORGAN INVOLVEMENT: ICD-10-CM

## 2024-09-18 DIAGNOSIS — M54.50 LUMBAR PAIN: ICD-10-CM

## 2024-09-18 DIAGNOSIS — G89.29 WRIST PAIN, CHRONIC, RIGHT: ICD-10-CM

## 2024-09-18 DIAGNOSIS — M06.00 SERONEGATIVE RHEUMATOID ARTHRITIS: Primary | ICD-10-CM

## 2024-09-18 DIAGNOSIS — M25.572 CHRONIC PAIN OF LEFT ANKLE: ICD-10-CM

## 2024-09-18 PROCEDURE — 3060F POS MICROALBUMINURIA REV: CPT | Mod: CPTII,S$GLB,, | Performed by: INTERNAL MEDICINE

## 2024-09-18 PROCEDURE — 3008F BODY MASS INDEX DOCD: CPT | Mod: CPTII,S$GLB,, | Performed by: INTERNAL MEDICINE

## 2024-09-18 PROCEDURE — 1101F PT FALLS ASSESS-DOCD LE1/YR: CPT | Mod: CPTII,S$GLB,, | Performed by: INTERNAL MEDICINE

## 2024-09-18 PROCEDURE — 99215 OFFICE O/P EST HI 40 MIN: CPT | Mod: S$GLB,,, | Performed by: INTERNAL MEDICINE

## 2024-09-18 PROCEDURE — 3066F NEPHROPATHY DOC TX: CPT | Mod: CPTII,S$GLB,, | Performed by: INTERNAL MEDICINE

## 2024-09-18 PROCEDURE — 1159F MED LIST DOCD IN RCRD: CPT | Mod: CPTII,S$GLB,, | Performed by: INTERNAL MEDICINE

## 2024-09-18 PROCEDURE — 3052F HG A1C>EQUAL 8.0%<EQUAL 9.0%: CPT | Mod: CPTII,S$GLB,, | Performed by: INTERNAL MEDICINE

## 2024-09-18 PROCEDURE — 4010F ACE/ARB THERAPY RXD/TAKEN: CPT | Mod: CPTII,S$GLB,, | Performed by: INTERNAL MEDICINE

## 2024-09-18 PROCEDURE — 1160F RVW MEDS BY RX/DR IN RCRD: CPT | Mod: CPTII,S$GLB,, | Performed by: INTERNAL MEDICINE

## 2024-09-18 PROCEDURE — 99999 PR PBB SHADOW E&M-EST. PATIENT-LVL V: CPT | Mod: PBBFAC,,, | Performed by: INTERNAL MEDICINE

## 2024-09-18 PROCEDURE — 3078F DIAST BP <80 MM HG: CPT | Mod: CPTII,S$GLB,, | Performed by: INTERNAL MEDICINE

## 2024-09-18 PROCEDURE — 3288F FALL RISK ASSESSMENT DOCD: CPT | Mod: CPTII,S$GLB,, | Performed by: INTERNAL MEDICINE

## 2024-09-18 PROCEDURE — 3074F SYST BP LT 130 MM HG: CPT | Mod: CPTII,S$GLB,, | Performed by: INTERNAL MEDICINE

## 2024-09-18 PROCEDURE — 1125F AMNT PAIN NOTED PAIN PRSNT: CPT | Mod: CPTII,S$GLB,, | Performed by: INTERNAL MEDICINE

## 2024-09-18 RX ORDER — TIZANIDINE 2 MG/1
4 TABLET ORAL EVERY 6 HOURS
Qty: 80 TABLET | Refills: 0 | Status: SHIPPED | OUTPATIENT
Start: 2024-09-18 | End: 2024-09-28

## 2024-09-18 RX ORDER — LINACLOTIDE 290 UG/1
290 CAPSULE, GELATIN COATED ORAL EVERY MORNING
COMMUNITY
Start: 2024-08-06

## 2024-09-18 NOTE — PROGRESS NOTES
Subjective:     Patient ID:  Patricia Fournier    Chief Complaint:  Disease Management     History of Present Illness:  Pt is a 64 y.o. female  seronegative RA, osteoarthritis, sjogren's syndrome. She is doing fair overall. She is taking plaquenil 200 mg twice daily. No recent flares of peripheral joint pain.   She complains of increased low back pain with moving from seated to standing, but she states pain improves with activity. She has known osteoarthritis of L4-5 and L5-S1. No radicular pain. No lower extremity weakness. She is taking ultracet on rare occasion for severe back pain.     We reviewed her recent labs-- potassium is elevated. HgbA1c is improving 6.9%. She has lost 10 lbs on mounjaro. CRP was elevated; however, pt was ill at time of lab draw.     Current tx:  1.  mg bid      Rheumatologic History:   - Diagnosis/es:  - Positive serologies:  - Infectious screening labs:  - Previous Treatments:  - Current Treatments:     Interval History:   Hospitalization since last office visit: No    Patient Active Problem List    Diagnosis Date Noted    Microalbuminuria     Prandial Facial Flushing     Chronic idiopathic constipation 04/18/2024    Stage 3b chronic kidney disease 01/24/2024    Type 2 diabetes mellitus with diabetic peripheral angiopathy without gangrene, with long-term current use of insulin 01/24/2024    Drug-induced immunodeficiency 09/26/2023    Vitamin D Deficiency 08/29/2023    Elevated Cardiac CT Calcium Score 07/03/2023    Gastroesophageal Reflux Disease 06/05/2023    Essential (primary) hypertension 06/05/2023    Type 2 diabetes mellitus with hyperglycemia, with long-term current use of insulin 06/05/2023    Type 2 diabetes mellitus with kidney complication, with long-term current use of insulin 06/05/2023    Bilateral Lower Extremity Varicose Veins 06/05/2023    Allergic Rhinosinusitis 06/05/2023    H/O Colon Polyps On 2019 Colonoscopy 06/05/2023    Hypercholesterolemia 06/05/2023    Left  Adrenal Adenoma 2023    Chronic Normocytic Anemia 2023    H/O Cholecystectomy 2023    Family H/O Colon Cancer 2023    Rheumatoid Arthritis 2023    Generalized Anxiety And Stress 2023    Chronic Fatigue 2023    Obstructive Sleep Apnea 2023    H/O Right Shoulder Arthroscopic Surgery 2023    H/O Lap Band Bariatric Surgery In 2023    Pulmonary Subcentimeter Nodules 2023    Class 3 severe obesity due to excess calories with serious comorbidity and body mass index (BMI) of 40.0 to 44.9 in adult 2020     Past Surgical History:   Procedure Laterality Date     SECTION      COLONOSCOPY N/A 2024    Procedure: COLONOSCOPY;  Surgeon: Markell Urena MD;  Location: [facility];  Service: Endoscopy;  Laterality: N/A;    COLONOSCOPY W/ POLYPECTOMY      2019    ESOPHAGOGASTRODUODENOSCOPY      2019    gallbladder removed      TUBAL LIGATION       Social History     Tobacco Use    Smoking status: Former     Current packs/day: 1.50     Average packs/day: 1.5 packs/day for 20.0 years (30.0 ttl pk-yrs)     Types: Cigarettes    Smokeless tobacco: Former     Quit date: 2002   Substance Use Topics    Alcohol use: No     Alcohol/week: 0.0 standard drinks of alcohol    Drug use: No     Family History   Problem Relation Name Age of Onset    Heart disease Mother      Diabetes Mother      Cancer Father      Diabetes Sister      Diabetes Brother      Diabetes Maternal Grandmother       Review of patient's allergies indicates:   Allergen Reactions    Allspice Hives     Pt states allergic to pimento( which is part of allspice family)    Augmentin [amoxicillin-pot clavulanate]      Headache, loss of appetite, nausea    Metformin      Contraindicated due to CKD    Pneumoc 20-aries conj-dip cr(pf) Rash     Swelling, cellulitis    Trulicity [dulaglutide] Other (See Comments)     Severe hypoglycemic and hospitalized       Review of Systems   Review of Systems  "  Constitutional:  Negative for activity change, appetite change, chills, diaphoresis, fatigue, fever and unexpected weight change.   HENT:  Negative for congestion, dental problem, ear discharge, ear pain, facial swelling, mouth sores, nosebleeds, postnasal drip, rhinorrhea, sinus pressure, sneezing, sore throat, tinnitus, trouble swallowing and voice change.    Eyes:  Negative for photophobia, pain, discharge, redness and itching.   Respiratory:  Negative for apnea, cough, chest tightness, shortness of breath and wheezing.    Cardiovascular:  Positive for leg swelling. Negative for chest pain and palpitations.   Gastrointestinal:  Negative for abdominal distention, abdominal pain, constipation, diarrhea, nausea and vomiting.   Endocrine: Negative for cold intolerance, heat intolerance, polydipsia and polyuria.   Genitourinary:  Negative for decreased urine volume, difficulty urinating, dysuria, flank pain, frequency, hematuria and urgency.   Musculoskeletal:  Positive for arthralgias, back pain, gait problem, joint swelling, myalgias, neck pain and neck stiffness.   Skin:  Negative for pallor, rash and wound.   Allergic/Immunologic: Negative for immunocompromised state.   Neurological:  Negative for dizziness, tremors, weakness, numbness and headaches.   Hematological:  Negative for adenopathy. Does not bruise/bleed easily.   Psychiatric/Behavioral:  Negative for sleep disturbance. The patient is not nervous/anxious.         Current Medications:  Current Outpatient Medications   Medication Instructions    BD ULTRA-FINE GEENA PEN NEEDLE 32 gauge x 5/32" Ndle Use 4x daily with insulin.    blood pressure monitor Kit 1 each, Misc.(Non-Drug; Combo Route), Daily    blood sugar diagnostic (ACCU-CHEK GUIDE TEST STRIPS) Strp 1 each, Misc.(Non-Drug; Combo Route), 2 times daily    blood sugar diagnostic Strp To check BG 4 times daily, to use with insurance preferred meter--accuchek guide--Pt with fluctuating glucoses and freq " "insulin adjustments.    blood-glucose meter kit To check BG 3 times daily, to use with insurance preferred meter    cetirizine (ZYRTEC) 10 mg, Oral, Nightly    cholecalciferol (vitamin D3) 5,000 Units, Oral, Daily    ciprofloxacin HCl (CIPRO) 250 mg, Oral, 2 times daily    cyanocobalamin (VITAMIN B-12) 1,000 mcg, Oral, Daily    FARXIGA 5 mg, Oral, Daily    ferrous sulfate (FEOSOL) 325 mg, Oral, With breakfast    FLUoxetine 20 mg, Oral    fluticasone propionate (FLONASE) 50 mcg/actuation nasal spray 2 sprays, Each Nostril, Daily    hydroxychloroquine (PLAQUENIL) 200 mg, Oral, 2 times daily    Lactobacillus acidophilus (PROBIOTIC ORAL) Oral    lancets Misc To check BG 3 times daily, to use with insurance preferred meter    LANTUS SOLOSTAR U-100 INSULIN 74 Units, Subcutaneous, Nightly    lisinopriL (PRINIVIL,ZESTRIL) 1.25 mg, Oral, Every morning, Hold if blood pressure is less than 120 systolic    miscellaneous medical supply (C-TUB) Misc Diabetic shoes with full-length semi-rigid accommadative  Orthotics posting medial heel DX E11.9, [place]. Pes planus.    pantoprazole (PROTONIX) 40 mg, Oral, Daily    pravastatin (PRAVACHOL) 80 mg, Oral, Nightly    PROCTO-MED HC 2.5 % rectal cream Rectal, 2 times daily    tirzepatide 10 mg, Subcutaneous, Every 7 days    tramadol-acetaminophen 37.5-325 mg (ULTRACET) 37.5-325 mg Tab 1 tablet, Oral, Every 6 hours PRN         Objective:     Vitals:    09/18/24 1604   BP: 114/68   Pulse: 74   Weight: 107.9 kg (237 lb 14 oz)   Height: 5' 2.01" (1.575 m)   PainSc:   3      Body mass index is 43.5 kg/m².     Physical Examinations:  Physical Exam   Constitutional: She is oriented to person, place, and time.   HENT:   Head: Normocephalic and atraumatic.   Eyes: Pupils are equal, round, and reactive to light. Right eye exhibits no discharge. Left eye exhibits no discharge.   Neck: No thyromegaly present.   Cardiovascular: Normal rate, regular rhythm and normal heart sounds. Exam reveals no gallop " and no friction rub.   No murmur heard.  Pulmonary/Chest: Breath sounds normal. She has no wheezes. She has no rales. She exhibits no tenderness.   Abdominal: There is no abdominal tenderness. There is no rebound and no guarding.   Musculoskeletal:         General: Tenderness present.      Right shoulder: Tenderness present.      Left shoulder: Tenderness present.      Right elbow: Normal.      Left elbow: Tenderness present.      Right wrist: Tenderness present.      Left wrist: Tenderness present.      Cervical back: Neck supple.      Right knee: Effusion present. Tenderness present.      Left knee: Effusion present. Tenderness present.   Lymphadenopathy:     She has no cervical adenopathy.   Neurological: She is alert and oriented to person, place, and time. Gait normal.   Skin: Skin is dry. No rash noted. No erythema. There is pallor.   Psychiatric: Mood and affect normal.   Vitals reviewed.      Right Side Rheumatological Exam     Examination finds the elbow normal.    The patient is tender to palpation of the shoulder, wrist, knee, 1st PIP, 1st MCP, 2nd PIP, 2nd MCP, 3rd PIP, 3rd MCP, 4th PIP, 4th MCP, 5th PIP and 5th MCP    She has swelling of the 1st PIP, 1st MCP, 2nd PIP, 2nd MCP, 3rd PIP, 3rd MCP, 4th PIP, 4th MCP, 5th PIP and 5th MCP    Shoulder Exam   Tenderness Location: no tenderness    Range of Motion   Active abduction:  abnormal   Adduction: abnormal  Sensation: normal    Knee Exam   Patellofemoral Crepitus: positive  Effusion: positive  Sensation: normal    Hip Exam   Tenderness Location: posterior  Sensation: normal    Elbow/Wrist Exam   Tenderness Location: no tenderness  Sensation: normal    Left Side Rheumatological Exam     The patient is tender to palpation of the shoulder, elbow, wrist, knee, 1st PIP, 1st MCP, 2nd PIP, 2nd MCP, 3rd PIP, 3rd MCP, 4th PIP, 4th MCP, 5th PIP and 5th MCP.    She has swelling of the 1st PIP, 1st MCP, 2nd PIP, 2nd MCP, 3rd PIP, 3rd MCP, 4th PIP, 4th MCP, 5th PIP  and 5th MCP    Shoulder Exam   Tenderness Location: no tenderness    Range of Motion   Active abduction:  abnormal   Sensation: normal    Knee Exam     Patellofemoral Crepitus: positive  Effusion: positive  Sensation: normal    Hip Exam   Tenderness Location: posterior  Sensation: normal    Elbow/Wrist Exam   Sensation: normal      Back/Neck Exam   General Inspection   Gait: normal            Disease Assessment Scores:  Patient's Global Assessment of arthritis (0-10): 2  Physician's Global Assessment of arthritis (0-10): 2  Number of Tender Joints (0-28): 2  Number of Swollen Joints (0-28): 2         No data to display                Monitoring Lab Results:  Lab Results   Component Value Date    WBC 5.15 09/09/2024    RBC 4.07 09/09/2024    HGB 12.5 09/09/2024    HCT 38.3 09/09/2024    MCV 94 09/09/2024    MCH 30.7 09/09/2024    MCHC 32.6 09/09/2024    RDW 12.7 09/09/2024     09/09/2024        Lab Results   Component Value Date     09/09/2024    K 4.3 09/09/2024     09/09/2024    CO2 26 09/09/2024     (H) 09/09/2024    BUN 28 (H) 09/09/2024    CREATININE 1.5 (H) 09/09/2024    CALCIUM 9.9 09/09/2024    PROT 6.9 09/09/2024    ALBUMIN 3.7 09/09/2024    BILITOT 0.4 09/09/2024    ALKPHOS 46 (L) 09/09/2024    AST 21 09/09/2024    ALT 19 09/09/2024    ANIONGAP 9 09/09/2024    EGFRNORACEVR 38.2 (A) 09/09/2024       Lab Results   Component Value Date    SEDRATE 18 09/09/2024    CRP 1.6 09/09/2024        Lab Results   Component Value Date    QMKDGPOB02PQ 68 05/28/2024    MNNMMWLD94 >1000 (H) 05/28/2024        Lab Results   Component Value Date    CHOL 138 05/28/2024    HDL 47 05/28/2024    LDLCALC 71.6 05/28/2024    TRIG 97 05/28/2024       Lab Results   Component Value Date    RF <13.0 08/03/2022    CCPANTIBODIE <0.5 08/03/2022     Lab Results   Component Value Date    ANASCREEN Positive (A) 05/15/2023    ANATITER 1:160 05/15/2023    DSDNA Negative 1:10 05/15/2023     No results found for:  ""HLABB27"    Infectious Disease Screening:  No results found for: "HEPBSAG", "HEPBCAB", "HEPBSAB", "HEPBSURFABQU", "HEPBIGM"  Lab Results   Component Value Date    HEPCAB Non-reactive 07/07/2023     No results found for: "TBGOLDPLUS", "QUANTTBGDPL"  No results found for: "QUANTIFERON", "SVCMT", "QUANTAGVALUE", "QUANTNILVALU", "QUANTMITOGEN", "QFTTBAG", "QINT"     Imaging: DEXA, Xrays, MRIs, CTs, etc    Old & Outside Medical Records:  Reviewed old and all outside medical records available in Care Everywhere     Assessment:     Encounter Diagnoses   Name Primary?    Seronegative rheumatoid arthritis Yes    Sjogren's syndrome, with unspecified organ involvement     Primary osteoarthritis of both knees     Chronic pain of left ankle     Wrist pain, chronic, right     Type 2 diabetes mellitus with other specified complication, with long-term current use of insulin        Plan:      Encounter Diagnoses   Name Primary?    Seronegative rheumatoid arthritis Yes    Sjogren's syndrome, with unspecified organ involvement     Primary osteoarthritis of both knees     Chronic pain of left ankle     Wrist pain, chronic, right     Type 2 diabetes mellitus with other specified complication, with long-term current use of insulin      Patricia was seen today for disease management.    Diagnoses and all orders for this visit:    Seronegative rheumatoid arthritis  -     tirzepatide 10 mg/0.5 mL PnIj; Inject 10 mg into the skin every 7 days.  -     Hemoglobin A1C; Future  -     Comprehensive Metabolic Panel; Future  -     CBC Auto Differential; Future  -     Hemoglobin A1C  -     Comprehensive Metabolic Panel  -     CBC Auto Differential    Sjogren's syndrome, with unspecified organ involvement  -     tirzepatide 10 mg/0.5 mL PnIj; Inject 10 mg into the skin every 7 days.  -     Hemoglobin A1C; Future  -     Comprehensive Metabolic Panel; Future  -     CBC Auto Differential; Future  -     Hemoglobin A1C  -     Comprehensive Metabolic " Panel  -     CBC Auto Differential    Primary osteoarthritis of both knees  -     tirzepatide 10 mg/0.5 mL PnIj; Inject 10 mg into the skin every 7 days.  -     Hemoglobin A1C; Future  -     Comprehensive Metabolic Panel; Future  -     CBC Auto Differential; Future  -     Hemoglobin A1C  -     Comprehensive Metabolic Panel  -     CBC Auto Differential    Chronic pain of left ankle  -     tirzepatide 10 mg/0.5 mL PnIj; Inject 10 mg into the skin every 7 days.  -     Hemoglobin A1C; Future  -     Comprehensive Metabolic Panel; Future  -     CBC Auto Differential; Future  -     Hemoglobin A1C  -     Comprehensive Metabolic Panel  -     CBC Auto Differential    Wrist pain, chronic, right  -     tirzepatide 10 mg/0.5 mL PnIj; Inject 10 mg into the skin every 7 days.  -     Hemoglobin A1C; Future  -     Comprehensive Metabolic Panel; Future  -     CBC Auto Differential; Future  -     Hemoglobin A1C  -     Comprehensive Metabolic Panel  -     CBC Auto Differential    Type 2 diabetes mellitus with other specified complication, with long-term current use of insulin  -     tirzepatide 10 mg/0.5 mL PnIj; Inject 10 mg into the skin every 7 days.  -     Hemoglobin A1C; Future  -     Hemoglobin A1C    Lumbar pain  -     tiZANidine (ZANAFLEX) 2 MG tablet; Take 2 tablets (4 mg total) by mouth every 6 (six) hours. for 10 days        1. Take  ultracet more  2. Continue  plaquenil seeing eye doc monthly for injections MD  3. Increase mounjaro 10    Follow-up 6 months    More than 50% of the  42 minute encounter was spent face to face counseling the patient regarding current status and future plan of care as well as side effects  of the medications. All questions were answered to patient's satisfaction also includes  non-face to face time preparing to see the patient (eg, review of tests), Obtaining and/or reviewing separately obtained history, Documenting clinical information in the electronic or other health record, Independently  interpreting results

## 2024-09-18 NOTE — PATIENT INSTRUCTIONS
Take  ultracet more  2. Continue  plaquenil seeing eye doc monthly for injections MD  3. Increase mounjaro 10    Follow-up 6 months

## 2025-02-27 PROBLEM — E11.3513 TYPE 2 DIABETES MELLITUS WITH PROLIFERATIVE RETINOPATHY OF BOTH EYES AND MACULAR EDEMA, UNSPECIFIED WHETHER LONG TERM INSULIN USE: Status: ACTIVE | Noted: 2025-02-27

## 2025-02-27 PROBLEM — E78.5 HYPERLIPIDEMIA: Status: ACTIVE | Noted: 2025-02-27

## 2025-02-27 PROBLEM — E78.00 HYPERCHOLESTEROLEMIA: Status: RESOLVED | Noted: 2023-06-05 | Resolved: 2025-02-27

## 2025-02-27 PROBLEM — I10 ESSENTIAL (PRIMARY) HYPERTENSION: Status: RESOLVED | Noted: 2023-06-05 | Resolved: 2025-02-27

## 2025-04-03 ENCOUNTER — LAB VISIT (OUTPATIENT)
Dept: LAB | Facility: HOSPITAL | Age: 65
End: 2025-04-03
Attending: INTERNAL MEDICINE
Payer: MEDICARE

## 2025-04-03 ENCOUNTER — OFFICE VISIT (OUTPATIENT)
Dept: RHEUMATOLOGY | Facility: CLINIC | Age: 65
End: 2025-04-03
Payer: MEDICARE

## 2025-04-03 VITALS
DIASTOLIC BLOOD PRESSURE: 61 MMHG | HEIGHT: 64 IN | SYSTOLIC BLOOD PRESSURE: 98 MMHG | WEIGHT: 234.56 LBS | HEART RATE: 85 BPM | BODY MASS INDEX: 40.04 KG/M2

## 2025-04-03 DIAGNOSIS — G89.4 CHRONIC PAIN SYNDROME: ICD-10-CM

## 2025-04-03 DIAGNOSIS — M79.672 PAIN IN BOTH FEET: ICD-10-CM

## 2025-04-03 DIAGNOSIS — E11.51 TYPE 2 DIABETES MELLITUS WITH DIABETIC PERIPHERAL ANGIOPATHY WITHOUT GANGRENE, WITH LONG-TERM CURRENT USE OF INSULIN: Primary | ICD-10-CM

## 2025-04-03 DIAGNOSIS — M35.00 SJOGREN'S SYNDROME, WITH UNSPECIFIED ORGAN INVOLVEMENT: ICD-10-CM

## 2025-04-03 DIAGNOSIS — Z79.4 TYPE 2 DIABETES MELLITUS WITH DIABETIC PERIPHERAL ANGIOPATHY WITHOUT GANGRENE, WITH LONG-TERM CURRENT USE OF INSULIN: ICD-10-CM

## 2025-04-03 DIAGNOSIS — M79.671 PAIN IN BOTH FEET: ICD-10-CM

## 2025-04-03 DIAGNOSIS — E11.51 TYPE 2 DIABETES MELLITUS WITH DIABETIC PERIPHERAL ANGIOPATHY WITHOUT GANGRENE, WITH LONG-TERM CURRENT USE OF INSULIN: ICD-10-CM

## 2025-04-03 DIAGNOSIS — Z79.899 HIGH RISK MEDICATION USE: ICD-10-CM

## 2025-04-03 DIAGNOSIS — M06.00 SERONEGATIVE RHEUMATOID ARTHRITIS: ICD-10-CM

## 2025-04-03 DIAGNOSIS — Z79.4 TYPE 2 DIABETES MELLITUS WITH DIABETIC PERIPHERAL ANGIOPATHY WITHOUT GANGRENE, WITH LONG-TERM CURRENT USE OF INSULIN: Primary | ICD-10-CM

## 2025-04-03 DIAGNOSIS — L40.50 PSA (PSORIATIC ARTHRITIS): ICD-10-CM

## 2025-04-03 LAB
25(OH)D3+25(OH)D2 SERPL-MCNC: 60 NG/ML (ref 30–96)
ABSOLUTE EOSINOPHIL (OHS): 0.33 K/UL
ABSOLUTE MONOCYTE (OHS): 0.54 K/UL (ref 0.3–1)
ABSOLUTE NEUTROPHIL COUNT (OHS): 3.3 K/UL (ref 1.8–7.7)
BASOPHILS # BLD AUTO: 0.04 K/UL
BASOPHILS NFR BLD AUTO: 0.7 %
CYCLIC CITRULLINATED PEPTIDE (CCP) (OHS): 0.7 U/ML
ERYTHROCYTE [DISTWIDTH] IN BLOOD BY AUTOMATED COUNT: 13.2 % (ref 11.5–14.5)
ERYTHROCYTE [SEDIMENTATION RATE] IN BLOOD BY PHOTOMETRIC METHOD: 10 MM/HR
HCT VFR BLD AUTO: 39.8 % (ref 37–48.5)
HGB BLD-MCNC: 12.8 GM/DL (ref 12–16)
IMM GRANULOCYTES # BLD AUTO: 0.02 K/UL (ref 0–0.04)
IMM GRANULOCYTES NFR BLD AUTO: 0.3 % (ref 0–0.5)
LYMPHOCYTES # BLD AUTO: 1.82 K/UL (ref 1–4.8)
MCH RBC QN AUTO: 30.1 PG (ref 27–31)
MCHC RBC AUTO-ENTMCNC: 32.2 G/DL (ref 32–36)
MCV RBC AUTO: 94 FL (ref 82–98)
NUCLEATED RBC (/100WBC) (OHS): 0 /100 WBC
PLATELET # BLD AUTO: 203 K/UL (ref 150–450)
PMV BLD AUTO: 11.4 FL (ref 9.2–12.9)
RBC # BLD AUTO: 4.25 M/UL (ref 4–5.4)
RELATIVE EOSINOPHIL (OHS): 5.5 %
RELATIVE LYMPHOCYTE (OHS): 30.1 % (ref 18–48)
RELATIVE MONOCYTE (OHS): 8.9 % (ref 4–15)
RELATIVE NEUTROPHIL (OHS): 54.5 % (ref 38–73)
WBC # BLD AUTO: 6.05 K/UL (ref 3.9–12.7)

## 2025-04-03 PROCEDURE — 84439 ASSAY OF FREE THYROXINE: CPT

## 2025-04-03 PROCEDURE — 86225 DNA ANTIBODY NATIVE: CPT

## 2025-04-03 PROCEDURE — 1160F RVW MEDS BY RX/DR IN RCRD: CPT | Mod: CPTII,S$GLB,, | Performed by: INTERNAL MEDICINE

## 2025-04-03 PROCEDURE — 86431 RHEUMATOID FACTOR QUANT: CPT

## 2025-04-03 PROCEDURE — 86235 NUCLEAR ANTIGEN ANTIBODY: CPT | Mod: 59

## 2025-04-03 PROCEDURE — 99999 PR PBB SHADOW E&M-EST. PATIENT-LVL V: CPT | Mod: PBBFAC,,, | Performed by: INTERNAL MEDICINE

## 2025-04-03 PROCEDURE — 4010F ACE/ARB THERAPY RXD/TAKEN: CPT | Mod: CPTII,S$GLB,, | Performed by: INTERNAL MEDICINE

## 2025-04-03 PROCEDURE — 82247 BILIRUBIN TOTAL: CPT

## 2025-04-03 PROCEDURE — 86038 ANTINUCLEAR ANTIBODIES: CPT

## 2025-04-03 PROCEDURE — 85652 RBC SED RATE AUTOMATED: CPT

## 2025-04-03 PROCEDURE — 3008F BODY MASS INDEX DOCD: CPT | Mod: CPTII,S$GLB,, | Performed by: INTERNAL MEDICINE

## 2025-04-03 PROCEDURE — 1159F MED LIST DOCD IN RCRD: CPT | Mod: CPTII,S$GLB,, | Performed by: INTERNAL MEDICINE

## 2025-04-03 PROCEDURE — 85025 COMPLETE CBC W/AUTO DIFF WBC: CPT

## 2025-04-03 PROCEDURE — 3078F DIAST BP <80 MM HG: CPT | Mod: CPTII,S$GLB,, | Performed by: INTERNAL MEDICINE

## 2025-04-03 PROCEDURE — 86800 THYROGLOBULIN ANTIBODY: CPT

## 2025-04-03 PROCEDURE — 1125F AMNT PAIN NOTED PAIN PRSNT: CPT | Mod: CPTII,S$GLB,, | Performed by: INTERNAL MEDICINE

## 2025-04-03 PROCEDURE — 84443 ASSAY THYROID STIM HORMONE: CPT

## 2025-04-03 PROCEDURE — 86200 CCP ANTIBODY: CPT

## 2025-04-03 PROCEDURE — 82306 VITAMIN D 25 HYDROXY: CPT

## 2025-04-03 PROCEDURE — 86235 NUCLEAR ANTIGEN ANTIBODY: CPT

## 2025-04-03 PROCEDURE — 36415 COLL VENOUS BLD VENIPUNCTURE: CPT | Mod: PO

## 2025-04-03 PROCEDURE — 84481 FREE ASSAY (FT-3): CPT

## 2025-04-03 PROCEDURE — 86140 C-REACTIVE PROTEIN: CPT

## 2025-04-03 PROCEDURE — 3074F SYST BP LT 130 MM HG: CPT | Mod: CPTII,S$GLB,, | Performed by: INTERNAL MEDICINE

## 2025-04-03 PROCEDURE — 86376 MICROSOMAL ANTIBODY EACH: CPT

## 2025-04-03 PROCEDURE — 1101F PT FALLS ASSESS-DOCD LE1/YR: CPT | Mod: CPTII,S$GLB,, | Performed by: INTERNAL MEDICINE

## 2025-04-03 PROCEDURE — 99215 OFFICE O/P EST HI 40 MIN: CPT | Mod: S$GLB,,, | Performed by: INTERNAL MEDICINE

## 2025-04-03 PROCEDURE — 3288F FALL RISK ASSESSMENT DOCD: CPT | Mod: CPTII,S$GLB,, | Performed by: INTERNAL MEDICINE

## 2025-04-03 RX ORDER — TIRZEPATIDE 12.5 MG/.5ML
12.5 INJECTION, SOLUTION SUBCUTANEOUS
Qty: 2 ML | Refills: 6 | Status: SHIPPED | OUTPATIENT
Start: 2025-04-03 | End: 2025-09-30

## 2025-04-03 RX ORDER — APREMILAST 10-20-30MG
KIT ORAL
Qty: 55 TABLET | Refills: 0 | Status: ACTIVE | OUTPATIENT
Start: 2025-04-03

## 2025-04-03 RX ORDER — HYDROCODONE BITARTRATE AND ACETAMINOPHEN 7.5; 325 MG/1; MG/1
1 TABLET ORAL EVERY 8 HOURS PRN
Qty: 21 TABLET | Refills: 0 | Status: SHIPPED | OUTPATIENT
Start: 2025-04-03 | End: 2025-04-10

## 2025-04-03 RX ORDER — APREMILAST 30 MG/1
30 TABLET, FILM COATED ORAL 2 TIMES DAILY
Qty: 60 TABLET | Refills: 11 | Status: ACTIVE | OUTPATIENT
Start: 2025-04-03

## 2025-04-03 RX ORDER — HYDROXYCHLOROQUINE SULFATE 200 MG/1
200 TABLET, FILM COATED ORAL 2 TIMES DAILY
Qty: 60 TABLET | Refills: 11 | Status: SHIPPED | OUTPATIENT
Start: 2025-04-03

## 2025-04-03 ASSESSMENT — ROUTINE ASSESSMENT OF PATIENT INDEX DATA (RAPID3)
PSYCHOLOGICAL DISTRESS SCORE: 1.1
TOTAL RAPID3 SCORE: 2.94
PAIN SCORE: 4
PATIENT GLOBAL ASSESSMENT SCORE: 1.5
MDHAQ FUNCTION SCORE: 1

## 2025-04-03 NOTE — PROGRESS NOTES
Subjective:     Patient ID:  Patricia Fournier    Chief Complaint:  Disease Management     History of Present Illness:  Pt is a 64 y.o. female  seronegative RA, osteoarthritis, sjogren's syndrome. She is doing fair overall. She is taking plaquenil 200 mg twice daily. No recent flares of peripheral joint pain.   She complains of increased low back pain with moving from seated to standing, but she states pain improves with activity. She has known osteoarthritis of L4-5 and L5-S1. No radicular pain. No lower extremity weakness. She is taking ultracet on rare occasion for severe back pain.     We reviewed her recent labs-- potassium is elevated. HgbA1c is improving 6.9%. She has lost 10 lbs on mounjaro. CRP was elevated; however, pt was ill at time of lab draw.     Current tx:  1.  mg bid             Rheumatologic History:   - Diagnosis/es:  - Positive serologies:  - Infectious screening labs:  - Previous Treatments:  - Current Treatments:     Interval History:   Hospitalization since last office visit: No    Patient Active Problem List    Diagnosis Date Noted    Hyperlipidemia 02/27/2025    Type 2 diabetes mellitus with proliferative retinopathy of both eyes and macular edema, unspecified whether long term insulin use 02/27/2025    Microalbuminuria     Prandial Facial Flushing     Chronic idiopathic constipation 04/18/2024    Stage 3b chronic kidney disease 01/24/2024    Type 2 diabetes mellitus with diabetic peripheral angiopathy without gangrene, with long-term current use of insulin 01/24/2024    Drug-induced immunodeficiency 09/26/2023    Vitamin D Deficiency 08/29/2023    Elevated Cardiac CT Calcium Score 07/03/2023    Gastroesophageal Reflux Disease 06/05/2023    Type 2 diabetes mellitus with hyperglycemia, with long-term current use of insulin 06/05/2023    Type 2 diabetes mellitus with kidney complication, with long-term current use of insulin 06/05/2023    Bilateral Lower Extremity Varicose Veins 06/05/2023     Allergic Rhinosinusitis 2023    History of colonic polyps 2023    Left Adrenal Adenoma 2023    Chronic Normocytic Anemia 2023    H/O Cholecystectomy 2023    Family H/O Colon Cancer 2023    Rheumatoid Arthritis 2023    Generalized Anxiety And Stress 2023    Chronic Fatigue 2023    Obstructive Sleep Apnea 2023    H/O Right Shoulder Arthroscopic Surgery 2023    H/O Lap Band Bariatric Surgery In 2023    Pulmonary Subcentimeter Nodules 2023    Class 3 severe obesity due to excess calories with serious comorbidity and body mass index (BMI) of 40.0 to 44.9 in adult 2020     Past Surgical History:   Procedure Laterality Date     SECTION      COLONOSCOPY N/A 2024    Procedure: COLONOSCOPY;  Surgeon: Markell Urena MD;  Location: [facility];  Service: Endoscopy;  Laterality: N/A;    COLONOSCOPY W/ POLYPECTOMY      2019    ESOPHAGOGASTRODUODENOSCOPY      2019    gallbladder removed      TUBAL LIGATION       Social History[1]  Family History   Problem Relation Name Age of Onset    Heart disease Mother      Diabetes Mother      Cancer Father      Diabetes Sister      Diabetes Brother      Diabetes Maternal Grandmother       Review of patient's allergies indicates:   Allergen Reactions    Allspice Hives     Pt states allergic to pimento( which is part of allspice family)    Augmentin [amoxicillin-pot clavulanate]      Headache, loss of appetite, nausea    Metformin      Contraindicated due to CKD    Pneumoc 20-aries conj-dip cr(pf) Rash     Swelling, cellulitis    Trulicity [dulaglutide] Other (See Comments)     Severe hypoglycemic and hospitalized       Review of Systems   Review of Systems   Constitutional:  Positive for chills and fatigue. Negative for activity change, appetite change, diaphoresis, fever and unexpected weight change.   HENT:  Negative for congestion, dental problem, ear discharge, ear pain, facial swelling,  "mouth sores, nosebleeds, postnasal drip, rhinorrhea, sinus pressure, sneezing, sore throat, tinnitus, trouble swallowing and voice change.    Eyes:  Negative for photophobia, pain, discharge, redness and itching.   Respiratory:  Positive for cough. Negative for apnea, chest tightness, shortness of breath and wheezing.    Cardiovascular:  Positive for leg swelling. Negative for chest pain and palpitations.   Gastrointestinal:  Positive for abdominal pain. Negative for abdominal distention, constipation, diarrhea, nausea and vomiting.   Endocrine: Negative for cold intolerance, heat intolerance, polydipsia and polyuria.   Genitourinary:  Negative for decreased urine volume, difficulty urinating, dysuria, flank pain, frequency, hematuria and urgency.   Musculoskeletal:  Positive for arthralgias, back pain, gait problem, joint swelling, myalgias, neck pain and neck stiffness.   Skin:  Negative for pallor, rash and wound.   Allergic/Immunologic: Negative for immunocompromised state.   Neurological:  Negative for dizziness, tremors, numbness and headaches.   Hematological:  Negative for adenopathy. Does not bruise/bleed easily.   Psychiatric/Behavioral:  Negative for sleep disturbance. The patient is not nervous/anxious.         Current Medications:  Current Outpatient Medications   Medication Instructions    apremilast (OTEZLA STARTER) 10 mg (4)-20 mg (4)-30 mg (47) DsPk As directed    BD ULTRA-FINE GEENA PEN NEEDLE 32 gauge x 5/32" Ndle Use 4x daily with insulin.    blood pressure monitor Kit 1 each, Misc.(Non-Drug; Combo Route), Daily    blood sugar diagnostic (ACCU-CHEK GUIDE TEST STRIPS) Strp 1 each, Misc.(Non-Drug; Combo Route), 2 times daily    blood sugar diagnostic Strp To check BG 4 times daily, to use with insurance preferred meter--accuchek guide--Pt with fluctuating glucoses and freq insulin adjustments.    blood-glucose meter kit To check BG 3 times daily, to use with insurance preferred meter    cetirizine " "(ZYRTEC) 10 mg, Oral, Nightly    cholecalciferol (vitamin D3) 5,000 Units, Oral, Daily    cyanocobalamin (VITAMIN B-12) 1,000 mcg, Oral, Daily    FARXIGA 5 mg, Oral, Daily    ferrous sulfate (FEOSOL) 325 mg, With breakfast    FLUoxetine 20 mg, Oral    fluticasone propionate (FLONASE) 50 mcg/actuation nasal spray 2 sprays, Daily    hydroxychloroquine (PLAQUENIL) 200 mg, Oral, 2 times daily    Lactobacillus acidophilus (PROBIOTIC ORAL) Take by mouth.    lancets [facility] [place] To check BG 3 times daily, to use with insurance preferred meter    LANTUS SOLOSTAR U-100 INSULIN 74 Units, Subcutaneous, Nightly    LINZESS 290 mcg, Every morning    miscellaneous medical supply (C-TUB) Misc Diabetic shoes with full-length semi-rigid accommadative  Orthotics posting medial heel DX E11.9, [place]. Pes planus.    montelukast (SINGULAIR) 10 mg, Oral, Nightly    MOUNJARO 12.5 mg, Subcutaneous, Every 7 days    OTEZLA 30 mg, Oral, 2 times daily    pantoprazole (PROTONIX) 40 mg, Oral, Daily    pravastatin (PRAVACHOL) 80 mg, Oral, Nightly    PROCTO-MED HC 2.5 % rectal cream 2 times daily    tirzepatide 10 mg, Subcutaneous, Every 7 days         Objective:     Vitals:    04/03/25 1402   BP: 98/61   Pulse: 85   Weight: 106.4 kg (234 lb 9.1 oz)   Height: 5' 4.02" (1.626 m)   PainSc:   2      Body mass index is 40.24 kg/m².     Physical Examinations:  Physical Exam   Constitutional: She is oriented to person, place, and time.   HENT:   Head: Normocephalic and atraumatic.   Mouth/Throat: Oropharynx is clear and moist.   Eyes: Pupils are equal, round, and reactive to light.   Neck: No thyromegaly present.   Cardiovascular: Normal rate, regular rhythm and normal heart sounds. Exam reveals no gallop and no friction rub.   No murmur heard.  Pulmonary/Chest: Breath sounds normal. She has no wheezes. She has no rales. She exhibits no tenderness.   Abdominal: There is no abdominal tenderness. There is no rebound and no guarding.   Musculoskeletal:      Right " shoulder: Tenderness present.      Left shoulder: Tenderness present.      Right elbow: Normal.      Left elbow: Normal.      Right wrist: Swelling and tenderness present.      Left wrist: Swelling and tenderness present.      Cervical back: Neck supple.      Right knee: No effusion. Tenderness present.      Left knee: No effusion. Tenderness present.      Right lower leg: Edema present.      Left lower leg: Edema present.      Left ankle: Swelling present.      Comments: Feet on top and under B   Lymphadenopathy:     She has no cervical adenopathy.   Neurological: She is alert and oriented to person, place, and time. Gait normal.   Skin: No rash noted. No erythema. No pallor.   Psychiatric: Mood and affect normal.   Nursing note and vitals reviewed.      Right Side Rheumatological Exam     Examination finds the elbow normal.    The patient is tender to palpation of the shoulder, wrist, knee, 1st PIP, 1st MCP, 2nd PIP, 2nd MCP, 3rd PIP, 3rd MCP, 4th PIP, 4th MCP, 5th PIP and 5th MCP    She has swelling of the wrist, 1st PIP, 1st MCP, 2nd PIP, 2nd MCP, 3rd PIP, 3rd MCP, 4th PIP, 4th MCP, 5th PIP and 5th MCP    The patient has an enlarged wrist    Shoulder Exam   Tenderness Location: no tenderness    Range of Motion   Active abduction:  abnormal   Adduction: abnormal  Sensation: normal    Knee Exam   Tenderness Location: lateral joint line  Patellofemoral Crepitus: positive  Effusion: negative  Sensation: normal    Hip Exam   Tenderness Location: posterior  Sensation: normal    Elbow/Wrist Exam   Tenderness Location: no tenderness  Sensation: normal    Muscle Strength (0-5 scale):  Neck Flexion:  2  Neck Extension: 2  : 2/5     Left Side Rheumatological Exam     Examination finds the elbow normal.    The patient is tender to palpation of the shoulder, wrist, knee, 1st PIP, 1st MCP, 2nd PIP, 2nd MCP, 3rd PIP, 3rd MCP, 4th PIP, 4th MCP, 5th PIP, 5th MCP and temporomandibular.    She has swelling of the wrist, 1st  PIP, 1st MCP, 2nd PIP, 2nd MCP, 3rd PIP, 3rd MCP, 4th PIP, 4th MCP, 5th PIP, 5th MCP, 1st CMC, 2nd DIP, 3rd DIP, 4th DIP, 5th DIP, knee, 1st MTP, 2nd MTP, 3rd MTP, 4th MTP, 1st toe IP, 2nd toe IP, 3rd toe IP, 4th toe IP and 5th toe IP    The patient has an enlarged wrist, 1st CMC, 2nd DIP, 3rd DIP, 4th DIP, 5th DIP, 1st toe IP, 2nd toe IP, 3rd toe IP, 4th toe IP and 5th toe IP.    Shoulder Exam   Tenderness Location: acromioclavicular joint    Range of Motion   Active abduction:  abnormal   Sensation: normal    Knee Exam     Patellofemoral Crepitus: positive  Effusion: negative  Sensation: normal    Hip Exam   Tenderness Location: posterior  Sensation: normal    Elbow/Wrist Exam   Sensation: normal    Muscle Strength (0-5 scale):  Neck Flexion:  2  Neck Extension: 2  :  1/5       Back/Neck Exam   General Inspection   Gait: normal            Disease Assessment Scores:  Patient's Global Assessment of arthritis (0-10): 1  Physician's Global Assessment of arthritis (0-10): 2  Number of Tender Joints (0-28): 1  Number of Swollen Joints (0-28): 2         No data to display                Monitoring Lab Results:  Lab Results   Component Value Date    WBC 6.05 04/03/2025    RBC 4.25 04/03/2025    HGB 12.8 04/03/2025    HCT 39.8 04/03/2025    MCV 94 04/03/2025    MCH 30.1 04/03/2025    MCHC 32.2 04/03/2025    RDW 13.2 04/03/2025     04/03/2025        Lab Results   Component Value Date     04/03/2025    K 4.2 04/03/2025     04/03/2025    CO2 23 04/03/2025     (H) 12/03/2024    BUN 28 (H) 04/03/2025    CREATININE 1.3 04/03/2025    CALCIUM 9.8 04/03/2025    PROT 7.0 12/03/2024    ALBUMIN 4.1 04/03/2025    BILITOT 0.4 04/03/2025    ALKPHOS 58 04/03/2025    AST 24 04/03/2025    ALT 21 04/03/2025    ANIONGAP 12 04/03/2025    EGFRNORACEVR 45 (L) 04/03/2025       Lab Results   Component Value Date    SEDRATE 10 04/03/2025    CRP 2.0 04/03/2025        Lab Results   Component Value Date    XKMFELAF66LV 60  "04/03/2025    OBDUTQAY05 >1000 (H) 05/28/2024        Lab Results   Component Value Date    CHOL 138 05/28/2024    HDL 47 05/28/2024    LDLCALC 71.6 05/28/2024    TRIG 97 05/28/2024       Lab Results   Component Value Date    RF <13.0 04/03/2025    CCPANTIBODIE 0.7 04/03/2025     Lab Results   Component Value Date    ANASCREEN Positive (A) 05/15/2023    ANATITER 1:160 05/15/2023    DSDNA Negative 1:10 05/15/2023    SMRNPAB 0.10 04/03/2025    SSAANTIBODY 0.09 04/03/2025    SSBANTIBODY 0.09 04/03/2025     No results found for: "HLABB27"    Infectious Disease Screening:  No results found for: "HEPBSAG", "HEPBCAB", "HEPBSAB", "HEPBSURFABQU", "HEPBIGM"  Lab Results   Component Value Date    HEPCAB Non-reactive 07/07/2023     No results found for: "TBGOLDPLUS", "QUANTTBGDPL"  No results found for: "QUANTIFERON", "SVCMT", "QUANTAGVALUE", "QUANTNILVALU", "QUANTMITOGEN", "QFTTBAG", "QINT"     Imaging:  DEXA, Xrays, MRIs, CTs, etc  Narrative & Impression  EXAMINATION:  DXA BONE DENSITY AXIAL SKELETON 1 OR MORE SITES     CLINICAL HISTORY:  Encounter for screening for osteoporosis     TECHNIQUE:  DXA scanning was performed over the bilateral hip and lumbar spine.  Review of the images confirms satisfactory positioning and technique.     COMPARISON:  None     FINDINGS:  The L1 to L4 vertebral bone mineral density is equal to 1.139 g/cm squared with a T score of -0.4.     The total hip bone mineral density is equal to 0.971 g/cm squared with a T score of -0.3.     There is a 9.1% risk of a major osteoporotic fracture and a 0.7% risk of hip fracture in the next 10 years (FRAX).     Impression:     Normal bone mineral density     Consider FDA approved medical therapies in postmenopausal women and men aged 50 years and older, based on the following:     Old & Outside Medical Records:  Reviewed old and all outside medical records available in Care Everywhere     Assessment:     Encounter Diagnoses   Name Primary?    Type 2 diabetes " mellitus with diabetic peripheral angiopathy without gangrene, with long-term current use of insulin Yes    Seronegative rheumatoid arthritis     Chronic pain syndrome     Sjogren's syndrome, with unspecified organ involvement     High risk medication use     Pain in both feet     PSA (psoriatic arthritis)         Plan:      Encounter Diagnoses   Name Primary?    Seronegative rheumatoid arthritis     Chronic pain syndrome     Sjogren's syndrome, with unspecified organ involvement     High risk medication use     Type 2 diabetes mellitus with diabetic peripheral angiopathy without gangrene, with long-term current use of insulin Yes    Pain in both feet     PSA (psoriatic arthritis)      Patricia was seen today for disease management.    Diagnoses and all orders for this visit:    Type 2 diabetes mellitus with diabetic peripheral angiopathy without gangrene, with long-term current use of insulin  -     tirzepatide (MOUNJARO) 12.5 mg/0.5 mL PnIj; Inject 12.5 mg into the skin every 7 days.  -     Vitamin D; Future  -     Cyclic Citrullinated Peptide Antibody, IgG; Future  -     Rheumatoid Factor; Future  -     Sedimentation rate; Future  -     C-Reactive Protein; Future  -     Comprehensive Metabolic Panel; Future  -     CBC Auto Differential; Future  -     Anti-Thyroglobulin Antibody; Future  -     T4, Free; Future  -     Thyroid Peroxidase Antibody; Future  -     TSH; Future  -     T3, Free; Future    Seronegative rheumatoid arthritis  -     X-Ray Cervical Spine AP And Lateral; Future  -     tirzepatide (MOUNJARO) 12.5 mg/0.5 mL PnIj; Inject 12.5 mg into the skin every 7 days.  -     hydroxychloroquine (PLAQUENIL) 200 mg tablet; Take 1 tablet (200 mg total) by mouth 2 (two) times daily.  -     HYDROcodone-acetaminophen (NORCO) 7.5-325 mg per tablet; Take 1 tablet by mouth every 8 (eight) hours as needed for Pain.  -     Vitamin D; Future  -     Cyclic Citrullinated Peptide Antibody, IgG; Future  -     Rheumatoid  Factor; Future  -     Sedimentation rate; Future  -     C-Reactive Protein; Future  -     Comprehensive Metabolic Panel; Future  -     CBC Auto Differential; Future  -     Anti-Thyroglobulin Antibody; Future  -     T4, Free; Future  -     Thyroid Peroxidase Antibody; Future  -     TSH; Future  -     T3, Free; Future  -     GRACIELA Screen w/Reflex; Future    Chronic pain syndrome  -     hydroxychloroquine (PLAQUENIL) 200 mg tablet; Take 1 tablet (200 mg total) by mouth 2 (two) times daily.  -     HYDROcodone-acetaminophen (NORCO) 7.5-325 mg per tablet; Take 1 tablet by mouth every 8 (eight) hours as needed for Pain.  -     Vitamin D; Future  -     Cyclic Citrullinated Peptide Antibody, IgG; Future  -     Rheumatoid Factor; Future  -     Sedimentation rate; Future  -     C-Reactive Protein; Future  -     Comprehensive Metabolic Panel; Future  -     CBC Auto Differential; Future  -     Anti-Thyroglobulin Antibody; Future  -     T4, Free; Future  -     Thyroid Peroxidase Antibody; Future  -     TSH; Future  -     T3, Free; Future  -     GRACIELA Screen w/Reflex; Future    Sjogren's syndrome, with unspecified organ involvement  -     hydroxychloroquine (PLAQUENIL) 200 mg tablet; Take 1 tablet (200 mg total) by mouth 2 (two) times daily.  -     HYDROcodone-acetaminophen (NORCO) 7.5-325 mg per tablet; Take 1 tablet by mouth every 8 (eight) hours as needed for Pain.  -     Vitamin D; Future  -     Cyclic Citrullinated Peptide Antibody, IgG; Future  -     Rheumatoid Factor; Future  -     Sedimentation rate; Future  -     C-Reactive Protein; Future  -     Comprehensive Metabolic Panel; Future  -     CBC Auto Differential; Future  -     Anti-Thyroglobulin Antibody; Future  -     T4, Free; Future  -     Thyroid Peroxidase Antibody; Future  -     TSH; Future  -     T3, Free; Future  -     GRACIELA Screen w/Reflex; Future    High risk medication use  -     hydroxychloroquine (PLAQUENIL) 200 mg tablet; Take 1 tablet (200 mg total) by mouth 2  (two) times daily.  -     HYDROcodone-acetaminophen (NORCO) 7.5-325 mg per tablet; Take 1 tablet by mouth every 8 (eight) hours as needed for Pain.  -     Vitamin D; Future  -     Cyclic Citrullinated Peptide Antibody, IgG; Future  -     Rheumatoid Factor; Future  -     Sedimentation rate; Future  -     C-Reactive Protein; Future  -     Comprehensive Metabolic Panel; Future  -     CBC Auto Differential; Future  -     Anti-Thyroglobulin Antibody; Future  -     T4, Free; Future  -     Thyroid Peroxidase Antibody; Future  -     TSH; Future  -     T3, Free; Future    Pain in both feet  -     X-Ray Foot AP Bilateral; Future  -     Vitamin D; Future  -     Cyclic Citrullinated Peptide Antibody, IgG; Future  -     Rheumatoid Factor; Future  -     Sedimentation rate; Future  -     C-Reactive Protein; Future  -     Comprehensive Metabolic Panel; Future  -     CBC Auto Differential; Future  -     Anti-Thyroglobulin Antibody; Future  -     T4, Free; Future  -     Thyroid Peroxidase Antibody; Future  -     TSH; Future  -     T3, Free; Future    PSA (psoriatic arthritis)  -     apremilast (OTEZLA STARTER) 10 mg (4)-20 mg (4)-30 mg (47) DsPk; As directed  -     apremilast (OTEZLA) 30 mg Tab; Take 1 tablet (30 mg total) by mouth 2 (two) times a day.  -     Vitamin D; Future  -     Cyclic Citrullinated Peptide Antibody, IgG; Future  -     Rheumatoid Factor; Future  -     Sedimentation rate; Future  -     C-Reactive Protein; Future  -     Comprehensive Metabolic Panel; Future  -     CBC Auto Differential; Future  -     Anti-Thyroglobulin Antibody; Future  -     T4, Free; Future  -     Thyroid Peroxidase Antibody; Future  -     TSH; Future  -     T3, Free; Future        1. Start otezla and continue plaquenil  2. Labs ordered  3. F/u 6 months     Follow-up 6 months    More than 50% of the  60 minute encounter was spent face to face counseling the patient regarding current status and future plan of care as well as side effects  of the  medications. All questions were answered to patient's satisfaction also includes  non-face to face time preparing to see the patient (eg, review of tests), Obtaining and/or reviewing separately obtained history, Documenting clinical information in the electronic or other health record, Independently interpreting results            [1]   Social History  Tobacco Use    Smoking status: Former     Current packs/day: 1.50     Average packs/day: 1.5 packs/day for 20.0 years (30.0 ttl pk-yrs)     Types: Cigarettes     Passive exposure: Past    Smokeless tobacco: Former     Quit date: 2/20/2002   Substance Use Topics    Alcohol use: No     Alcohol/week: 0.0 standard drinks of alcohol    Drug use: No

## 2025-04-04 ENCOUNTER — RESULTS FOLLOW-UP (OUTPATIENT)
Dept: RHEUMATOLOGY | Facility: CLINIC | Age: 65
End: 2025-04-04

## 2025-04-04 LAB
ALBUMIN SERPL BCP-MCNC: 4.1 G/DL (ref 3.5–5.2)
ALP SERPL-CCNC: 58 UNIT/L (ref 40–150)
ALT SERPL W/O P-5'-P-CCNC: 21 UNIT/L (ref 10–44)
ANA (OHS): POSITIVE
ANA PATTERN 1 (OHS): ABNORMAL
ANA TITER 1 (OHS): ABNORMAL
ANION GAP (OHS): 12 MMOL/L (ref 8–16)
AST SERPL-CCNC: 24 UNIT/L (ref 11–45)
BILIRUB SERPL-MCNC: 0.4 MG/DL (ref 0.1–1)
BUN SERPL-MCNC: 28 MG/DL (ref 8–23)
CALCIUM SERPL-MCNC: 9.8 MG/DL (ref 8.7–10.5)
CHLORIDE SERPL-SCNC: 104 MMOL/L (ref 95–110)
CO2 SERPL-SCNC: 23 MMOL/L (ref 23–29)
CREAT SERPL-MCNC: 1.3 MG/DL (ref 0.5–1.4)
CRP SERPL-MCNC: 2 MG/L
GFR SERPLBLD CREATININE-BSD FMLA CKD-EPI: 45 ML/MIN/1.73/M2
GLUCOSE SERPL-MCNC: 95 MG/DL (ref 70–110)
POTASSIUM SERPL-SCNC: 4.2 MMOL/L (ref 3.5–5.1)
PROT SERPL-MCNC: 7.7 GM/DL (ref 6–8.4)
RHEUMATOID FACT SERPL-ACNC: <13 IU/ML
SODIUM SERPL-SCNC: 139 MMOL/L (ref 136–145)
T3FREE SERPL-MCNC: 2.8 PG/ML (ref 2.3–4.2)
T4 FREE SERPL-MCNC: 0.94 NG/DL (ref 0.71–1.51)
THYROGLOB AB SERPL IA-ACNC: <4 IU/ML (ref 0–3.9)
THYROPEROXIDASE IGG SERPL-ACNC: <6 IU/ML
TSH SERPL-ACNC: 3.89 UIU/ML (ref 0.4–4)

## 2025-04-07 LAB
DSDNA ANTIBODY (OHS): NORMAL
DSDNA ANTIBODY TITER (OHS): NORMAL
SM  ANTIBODY (OHS): 0.11 RATIO
SM INTERPRETATION (OHS): NEGATIVE
SM/RNP ANTIBODY (OHS): 0.1 RATIO
SM/RNP INTERPRETATION (OHS): NEGATIVE
SSA  ANTIBODY (OHS): 0.09 RATIO
SSA INTERPRETATION (OHS): NEGATIVE
SSB  ANTIBODY (OHS): 0.09 RATIO
SSB INTERPRETATION (OHS): NEGATIVE

## 2025-04-08 ENCOUNTER — TELEPHONE (OUTPATIENT)
Dept: RHEUMATOLOGY | Facility: CLINIC | Age: 65
End: 2025-04-08
Payer: MEDICARE

## 2025-04-08 ENCOUNTER — HOSPITAL ENCOUNTER (OUTPATIENT)
Dept: RADIOLOGY | Facility: HOSPITAL | Age: 65
Discharge: HOME | End: 2025-04-08
Attending: INTERNAL MEDICINE
Payer: MEDICARE

## 2025-04-08 DIAGNOSIS — M79.672 PAIN IN BOTH FEET: ICD-10-CM

## 2025-04-08 DIAGNOSIS — M06.00 SERONEGATIVE RHEUMATOID ARTHRITIS: ICD-10-CM

## 2025-04-08 DIAGNOSIS — M79.671 PAIN IN BOTH FEET: ICD-10-CM

## 2025-04-08 PROCEDURE — 73620 X-RAY EXAM OF FOOT: CPT | Mod: 26,50,, | Performed by: RADIOLOGY

## 2025-04-08 PROCEDURE — 73620 X-RAY EXAM OF FOOT: CPT | Mod: TC,50,FY,PO

## 2025-04-08 PROCEDURE — 72040 X-RAY EXAM NECK SPINE 2-3 VW: CPT | Mod: TC,FY,PO

## 2025-04-08 PROCEDURE — 72040 X-RAY EXAM NECK SPINE 2-3 VW: CPT | Mod: 26,,, | Performed by: RADIOLOGY

## 2025-04-08 NOTE — TELEPHONE ENCOUNTER
----- Message from Murali Wilson MD sent at 4/4/2025  6:16 PM CDT -----  GRACIELA is positive as it has been in the past your kidney function is much better your GFR is 45 whereas prior it was between 35 and 39 your thyroid levels are normal your headed in the perfect   direction.  ----- Message -----  From: Lab, Background User  Sent: 4/3/2025  10:30 PM CDT  To: Murali Wilson MD

## 2025-04-08 NOTE — TELEPHONE ENCOUNTER
Patient informed of lab communication by Dr. Wilson.  Patient stated she had her xrays performed today.  Patient verbalized understanding of lab results.  Claudia CASTILLO

## 2025-04-14 PROBLEM — L40.50 PSORIATIC ARTHRITIS: Status: ACTIVE | Noted: 2025-04-14

## 2025-04-17 ENCOUNTER — TELEPHONE (OUTPATIENT)
Dept: RHEUMATOLOGY | Facility: CLINIC | Age: 65
End: 2025-04-17
Payer: MEDICARE

## 2025-04-17 NOTE — TELEPHONE ENCOUNTER
Patient has diagnosis of seronegative RA & PsA. On  mg BID and has not tried/failed any agent to my knowledge. Could consider TNFi or Orencia to treat both RA and PsA    Will discuss with Dr. Wilson

## 2025-04-17 NOTE — TELEPHONE ENCOUNTER
----- Message from Pharmacist Rodo sent at 4/14/2025  3:08 PM CDT -----  Regarding: Avi García  Good afternoon,The Otezla PA for Ms. Fournier was denied as she has not tried the plan preferred medications Cosentyx, Skyrizi, Stelara, or Tremfya. If appropriate, please send a prescription for one of the preferred agents to [facility].Thank you,Rodo Mann, PharmDClinical PharmacistOchsner Specialty [phone]

## 2025-05-06 ENCOUNTER — OFFICE VISIT (OUTPATIENT)
Dept: RHEUMATOLOGY | Facility: CLINIC | Age: 65
End: 2025-05-06
Payer: MEDICARE

## 2025-05-06 ENCOUNTER — LAB VISIT (OUTPATIENT)
Dept: LAB | Facility: HOSPITAL | Age: 65
End: 2025-05-06
Attending: INTERNAL MEDICINE
Payer: MEDICARE

## 2025-05-06 VITALS
HEIGHT: 64 IN | HEART RATE: 73 BPM | SYSTOLIC BLOOD PRESSURE: 122 MMHG | BODY MASS INDEX: 40.24 KG/M2 | DIASTOLIC BLOOD PRESSURE: 61 MMHG | WEIGHT: 235.69 LBS

## 2025-05-06 DIAGNOSIS — D84.9 IMMUNOCOMPROMISED: ICD-10-CM

## 2025-05-06 DIAGNOSIS — L40.50 PSA (PSORIATIC ARTHRITIS): ICD-10-CM

## 2025-05-06 DIAGNOSIS — Z51.81 MEDICATION MONITORING ENCOUNTER: ICD-10-CM

## 2025-05-06 DIAGNOSIS — Z79.899 HIGH RISK MEDICATION USE: ICD-10-CM

## 2025-05-06 DIAGNOSIS — M06.00 SERONEGATIVE RHEUMATOID ARTHRITIS: ICD-10-CM

## 2025-05-06 DIAGNOSIS — M06.00 SERONEGATIVE RHEUMATOID ARTHRITIS: Primary | ICD-10-CM

## 2025-05-06 LAB
HBV CORE AB SERPL QL IA: NORMAL
HBV SURFACE AB SER-ACNC: <3 MIU/ML
HBV SURFACE AB SERPL IA-ACNC: NORMAL M[IU]/ML
HBV SURFACE AG SERPL QL IA: NORMAL
HCV AB SERPL QL IA: NORMAL

## 2025-05-06 PROCEDURE — 87340 HEPATITIS B SURFACE AG IA: CPT

## 2025-05-06 PROCEDURE — 86480 TB TEST CELL IMMUN MEASURE: CPT

## 2025-05-06 PROCEDURE — 86704 HEP B CORE ANTIBODY TOTAL: CPT

## 2025-05-06 PROCEDURE — 36415 COLL VENOUS BLD VENIPUNCTURE: CPT | Mod: PO

## 2025-05-06 PROCEDURE — 99999 PR PBB SHADOW E&M-EST. PATIENT-LVL V: CPT | Mod: PBBFAC,,,

## 2025-05-06 PROCEDURE — 86706 HEP B SURFACE ANTIBODY: CPT | Mod: 59

## 2025-05-06 PROCEDURE — 86803 HEPATITIS C AB TEST: CPT

## 2025-05-06 RX ORDER — ADALIMUMAB 40MG/0.4ML
40 KIT SUBCUTANEOUS
Qty: 2 PEN | Refills: 11 | Status: ACTIVE | OUTPATIENT
Start: 2025-05-06

## 2025-05-06 ASSESSMENT — ROUTINE ASSESSMENT OF PATIENT INDEX DATA (RAPID3)
PAIN SCORE: 5
PATIENT GLOBAL ASSESSMENT SCORE: 3
MDHAQ FUNCTION SCORE: 1.1
PSYCHOLOGICAL DISTRESS SCORE: 2.2
FATIGUE SCORE: 1.1
TOTAL RAPID3 SCORE: 3.89

## 2025-05-06 NOTE — PROGRESS NOTES
Time: 39 mins  # of DX: 5    Subjective:     Patient ID:  Patricia Fournier    Chief Complaint: Discussion of Treatment Options     Referring Provider: Murali Wilson MD    History of Present Illness:  Pt is a 64 y.o. female who presents to the clinic with PsA, HLD. T2DM, CKD, GERD, RA, and DRAKE. This is a new patient to me but has been following with Murali Wilson MD.      Social History     Tobacco Use    Smoking status: Former     Current packs/day: 1.50     Average packs/day: 1.5 packs/day for 20.0 years (30.0 ttl pk-yrs)     Types: Cigarettes     Passive exposure: Past    Smokeless tobacco: Former     Quit date: 2/20/2002   Substance Use Topics    Alcohol use: No     Alcohol/week: 0.0 standard drinks of alcohol    Drug use: No     Family History   Problem Relation Name Age of Onset    Heart disease Mother      Diabetes Mother      Cancer Father      Diabetes Sister      Diabetes Brother      Diabetes Maternal Grandmother       Review of patient's allergies indicates:   Allergen Reactions    Allspice Hives     Pt states allergic to pimento( which is part of allspice family)    Augmentin [amoxicillin-pot clavulanate]      Headache, loss of appetite, nausea    Metformin      Contraindicated due to CKD    Pneumoc 20-aries conj-dip cr(pf) Rash     Swelling, cellulitis    Trulicity [dulaglutide] Other (See Comments)     Severe hypoglycemic and hospitalized     Prior Rheum Therapies:   none    PsA & RA:  Current therapy:  mg BID  Pain in the wrist, ankles, and feet; rated it 5/10  Both ankles and legs are swollen; > in left ankle   Discussed Orencia and Humira  Patient open to trying Humira      Vaccines:  Patient is due for Influenza (annually), COVID, TdaP (every 10 years), and RSV (once if 60 years old or older)    Influenza: Discuss and recommend annually. Due next flu season starting around Sept./Oct.   PCV 20: 8/2022  PPSV 23: 1/2013 & 11/2016  Tetanus (TdaP): Discuss and recommend booster every 10 years. 10/2015.  "Due 10/2025  Shingrix: 5/2023 & 8/2023   Covid: CDC recommends 2 new 4196-2341 doses 2-6 months apart in immunocompromised patients that has received 2 or more COVID doses  RSV: Discuss and recommend 1 dose    Current Medications:  Current Outpatient Medications   Medication Instructions    apremilast (OTEZLA STARTER) 10 mg (4)-20 mg (4)-30 mg (47) DsPk As directed    BD GEENA 2ND GEN PEN NEEDLE 32 gauge x 5/32" Ndle USE AS DIRECTED 4 TIMES DAILY WITH INSULIN    blood pressure monitor Kit 1 each, Misc.(Non-Drug; Combo Route), Daily    blood sugar diagnostic (ACCU-CHEK GUIDE TEST STRIPS) Strp 1 each, Misc.(Non-Drug; Combo Route), 2 times daily    blood sugar diagnostic Strp To check BG 4 times daily, to use with insurance preferred meter--accuchek guide--Pt with fluctuating glucoses and freq insulin adjustments.    blood-glucose meter kit To check BG 3 times daily, to use with insurance preferred meter    cetirizine (ZYRTEC) 10 mg, Oral, Nightly    cholecalciferol (vitamin D3) 5,000 Units, Oral, Daily    cyanocobalamin (VITAMIN B-12) 1,000 mcg, Oral, Daily    FARXIGA 5 mg, Oral, Daily    ferrous sulfate (FEOSOL) 325 mg, With breakfast    FLUoxetine 20 mg, Oral    fluticasone propionate (FLONASE) 50 mcg/actuation nasal spray 2 sprays, Daily    hydroxychloroquine (PLAQUENIL) 200 mg, Oral, 2 times daily    Lactobacillus acidophilus (PROBIOTIC ORAL) Take by mouth.    lancets [facility] To check BG 3 times daily, to use with insurance preferred meter    LANTUS SOLOSTAR U-100 INSULIN 74 Units, Subcutaneous, Nightly    LINZESS 290 mcg, Every morning    miscellaneous medical supply (C-TUB) Misc Diabetic shoes with full-length semi-rigid accommadative  Orthotics posting medial heel DX E11.9, [place]. Pes planus.    montelukast (SINGULAIR) 10 mg, Oral, Nightly    MOUNJARO 12.5 mg, Subcutaneous, Every 7 days    OTEZLA 30 mg, Oral, 2 times daily    pantoprazole (PROTONIX) 40 mg, Oral, Daily    pravastatin (PRAVACHOL) 80 mg, Oral, Nightly " "   PROCTO-MED HC 2.5 % rectal cream 2 times daily    promethazine-dextromethorphan (PROMETHAZINE-DM) 6.25-15 mg/5 mL Syrp 5 mLs, Oral, Every 8 hours PRN    tirzepatide 10 mg, Subcutaneous, Every 7 days     Objective:     Vitals:    05/06/25 1350   BP: 122/61   Pulse: 73   Weight: 106.9 kg (235 lb 10.8 oz)   Height: 5' 4.02" (1.626 m)   PainSc:   5      BP Readings from Last 4 Encounters:   04/03/25 98/61   02/27/25 106/62   12/10/24 110/60   09/18/24 114/68     Body mass index is 40.43 kg/m².         5/6/2025     2:37 PM 4/3/2025     4:22 PM 2/27/2024    10:00 AM 9/26/2023     1:04 PM 5/22/2023    10:41 AM 1/25/2023     2:52 PM 9/23/2022     8:41 AM   Multi-Dimensional Health Assessment   MHAQ Score 1.1 1 0.8 1.3 1.6 1.5 1.3   Psychologic Score 2.2 1.1 0 0 0 0 2.2   Pain Score 5 4 1.5 4 4.5 5 4   Fatigue Score 1.1  0 1.1 1.1 1.1 2.2   Global Health Score 3 1.5 0.5 3 4.5 3 4.5   RAPID3 Score 3.89 2.94 1.55 3.78 4.78 4.33 4.28      Monitoring Lab Results:  Lab Results   Component Value Date    WBC 6.05 04/03/2025    RBC 4.25 04/03/2025    HGB 12.8 04/03/2025    HCT 39.8 04/03/2025    MCV 94 04/03/2025    MCH 30.1 04/03/2025    MCHC 32.2 04/03/2025    RDW 13.2 04/03/2025     04/03/2025      Lab Results   Component Value Date     04/03/2025    K 4.2 04/03/2025     04/03/2025    CO2 23 04/03/2025    GLU 95 04/03/2025    BUN 28 (H) 04/03/2025    CREATININE 1.3 04/03/2025    CALCIUM 9.8 04/03/2025    PROT 7.7 04/03/2025    ALBUMIN 4.1 04/03/2025    BILITOT 0.4 04/03/2025    ALKPHOS 58 04/03/2025    AST 24 04/03/2025    ALT 21 04/03/2025    ANIONGAP 12 04/03/2025    EGFRNORACEVR 45 (L) 04/03/2025     Lab Results   Component Value Date    SEDRATE 10 04/03/2025    CRP 2.0 04/03/2025      Lab Results   Component Value Date    RF <13.0 04/03/2025    CCPANTIBODIE 0.7 04/03/2025     Lab Results   Component Value Date    ANASCREEN Positive (A) 05/15/2023    ANATITER 1:160 05/15/2023    DSDNA Negative 1:10 " "05/15/2023    SMRNPAB 0.10 04/03/2025    SSAANTIBODY 0.09 04/03/2025    SSBANTIBODY 0.09 04/03/2025     No results found for: "HLABB27"    Infectious Disease Screening:  No results found for: "HEPBSAG", "HEPBCAB", "HEPBSAB", "HEPBSURFABQU", "HEPBIGM"  Lab Results   Component Value Date    HEPCAB Non-reactive 07/07/2023     No results found for: "TBGOLDPLUS", "QUANTTBGDPL"  No results found for: "QUANTIFERON", "SVCMT", "QUANTAGVALUE", "QUANTNILVALU", "QUANTMITOGEN", "QFTTBAG", "QINT"     Assessment:     Patient  is a 64 y.o. female with rheumatoid arthritis and psoriatic arthritis was referred to the Rheumatology pharmacist for discussion of treatment options. Will initiate adalimumab (HUMIRA). Provided pt with education (MOA, frequency, route of administration, onset of action, injection instructions and safety profile) on adalimumab (HUMIRA). Counseled pt on how to inject new biologic therapy. Explained importance of vaccines considering the increased risk of infections. Encouraged pt to practice proper hand hygiene considering increased risk of infection with biologics and to avoid raw seafood/live vaccines. Reviewed needed labs to ensure medication is being used safely.     Plan:      Encounter Diagnoses   Name Primary?    Seronegative rheumatoid arthritis Yes    PSA (psoriatic arthritis)     Medication monitoring encounter     Immunocompromised     High risk medication use      1. Discussion of Treatment Options: .   Humira 40 mg SC every 2 weeks sent to [facility]   Patient to make us aware if they ever experiences s/sx of an infection including fever, chills, URI symptoms or UTI symptoms.  Provided patient with handouts on education about their biologic   Due for annual Hep B, Hep C, and TB. Ordered   Patient verbalized understanding instructions    2. Health Maintenance  Vaccines needed: 2 new COVID doses, Shingrix x 2, RSV, and TdaP 10/2025   Advised pt to wait at least 7 days before getting any immunizations after " receiving first dose of current biologic     Follow-up scheduled on 12/1/2025 with MD Cheyanne Mcfarland, PharmD, [facility]  Rheumatology Clinical Pharmacist  [facility]

## 2025-05-06 NOTE — PATIENT INSTRUCTIONS
- Vaccines:   COVID vaccine: 2 new [id number] formulated dose 2-6 months - Local pharmacy    Influenza (Flu): 1 dose annually starting around Sept/Oct. - Local pharmacy or PCP's office  Respiratory syncytial virus (RSV): 1 dose - Local pharmacy   Tetanus (Tdap): 1 dose every 10 years around 10/2025 - Local pharmacy

## 2025-05-08 LAB
MITOGEN MINUS NIL (OHS): 10
NIL TB SYNCED (OHS): 0
QUANTIFERON GOLD INTERP (OHS): NEGATIVE
TB1 AG MINUS NIL (OHS): 0.01
TB2 AG MINUS NIL (OHS): 0.01

## 2025-05-19 ENCOUNTER — TELEPHONE (OUTPATIENT)
Dept: RHEUMATOLOGY | Facility: CLINIC | Age: 65
End: 2025-05-19
Payer: MEDICARE

## 2025-05-19 NOTE — TELEPHONE ENCOUNTER
----- Message from Malinda sent at 5/19/2025  9:27 AM CDT -----  Type: Needs Medical AdviceWho Called:  Aroldo Call Back Number: [phone] Information: pt is calling the office to let the nurse know she has received her Rx adalimumab (HUMIRA,CF, PEN) 40 mg/0.4 mL PnKt and needs to learn how to use them. Please call back to advise. Thanks!

## 2025-05-22 ENCOUNTER — OFFICE VISIT (OUTPATIENT)
Dept: RHEUMATOLOGY | Facility: CLINIC | Age: 65
End: 2025-05-22
Payer: MEDICARE

## 2025-05-22 VITALS
SYSTOLIC BLOOD PRESSURE: 119 MMHG | BODY MASS INDEX: 40.24 KG/M2 | DIASTOLIC BLOOD PRESSURE: 62 MMHG | HEIGHT: 64 IN | HEART RATE: 71 BPM | WEIGHT: 235.69 LBS

## 2025-05-22 DIAGNOSIS — Z71.85 IMMUNIZATION COUNSELING: ICD-10-CM

## 2025-05-22 DIAGNOSIS — Z51.81 MEDICATION MONITORING ENCOUNTER: ICD-10-CM

## 2025-05-22 DIAGNOSIS — M06.00 SERONEGATIVE RHEUMATOID ARTHRITIS: Primary | ICD-10-CM

## 2025-05-22 DIAGNOSIS — L40.50 PSA (PSORIATIC ARTHRITIS): ICD-10-CM

## 2025-05-22 PROCEDURE — 99999 PR PBB SHADOW E&M-EST. PATIENT-LVL IV: CPT | Mod: PBBFAC,,,

## 2025-05-22 NOTE — PATIENT INSTRUCTIONS
It was a pleasure meeting you today. As a reminder, my name is Dr. Cheyanne Gonzalez. I'm the clinical pharmacist in the Rheumatology office. If you have any questions or need any assistance, please reach out to the office.    Next Humira injection on Thursday, 6/5/2025     Please wait at least 7 days after receiving your first Humira injection before receiving any vaccines    - Vaccines:   COVID vaccine: 2 new [id number] formulated dose 2-6 months - Local pharmacy    Respiratory syncytial virus (RSV): 1 dose - Local pharmacy   Tetanus (Tdap): 1 dose every 10 years around 10/2025 - Local pharmacy  Influenza (Flu): 1 dose annually starting around Sept/Oct. - Local pharmacy or PCP's office

## 2025-05-22 NOTE — PROGRESS NOTES
"Time: 39 mins  # of DX: 4    Subjective:     Patient ID:  Patricia Fournier    Chief Complaint: Biologic education, training, and observation     Referring Provider: Murali Wilson MD    History of Present Illness:  Pt is a 64 y.o. female who presents to the clinic with PsA, HLD. T2DM, CKD, GERD, RA, and DRAKE. This is an established patient.       Prior Rheum Therapies:   None  Otezla - never started d/t insurance denying      PsA & RA:  Current therapy:  mg BID & Humira 40 mg SC every 2 weeks  Dispensing pharmacy: [facility]  Reviewed Humira injection instructions  Patient feels comfortable completing injection and successfully completed in the office    Vaccines:  Patient is due for Influenza (annually), COVID, TdaP (every 10 years), and RSV (once if 60 years old or older)     Influenza: Discuss and recommend annually. Due next flu season starting around Sept./Oct.   PCV 20: 8/2022  PPSV 23: 1/2013 & 11/2016  Tetanus (TdaP): Discuss and recommend booster every 10 years. 10/2015. Due 10/2025  Shingrix: 5/2023 & 8/2023   Covid: CDC recommends 2 new 3185-5142 doses 2-6 months apart in immunocompromised patients that has received 2 or more COVID doses  RSV: Discuss and recommend 1 dose    Current Medications:  Current Outpatient Medications   Medication Instructions    adalimumab (HUMIRA,CF, PEN) 40 mg/0.4 mL PnKt Inject 1 pen (40 mg total) into the skin every 14 (fourteen) days.    BD GEENA 2ND GEN PEN NEEDLE 32 gauge x 5/32" Ndle USE AS DIRECTED 4 TIMES DAILY WITH INSULIN    blood pressure monitor Kit 1 each, Misc.(Non-Drug; Combo Route), Daily    blood sugar diagnostic (ACCU-CHEK GUIDE TEST STRIPS) Strp 1 each, Misc.(Non-Drug; Combo Route), 2 times daily    blood sugar diagnostic Strp To check BG 4 times daily, to use with insurance preferred meter--accuchek guide--Pt with fluctuating glucoses and freq insulin adjustments.    blood-glucose meter kit To check BG 3 times daily, to use with insurance preferred meter    " "cetirizine (ZYRTEC) 10 mg, Oral, Nightly    cholecalciferol (vitamin D3) 5,000 Units, Oral, Daily    cyanocobalamin (VITAMIN B-12) 1,000 mcg, Oral, Daily    FARXIGA 5 mg, Oral, Daily    ferrous sulfate (FEOSOL) 325 mg, With breakfast    FLUoxetine 20 mg, Oral    fluticasone propionate (FLONASE) 50 mcg/actuation nasal spray 2 sprays, Daily    hydroxychloroquine (PLAQUENIL) 200 mg, Oral, 2 times daily    Lactobacillus acidophilus (PROBIOTIC ORAL) Take by mouth.    lancets [facility] To check BG 3 times daily, to use with insurance preferred meter    LANTUS SOLOSTAR U-100 INSULIN 74 Units, Subcutaneous, Nightly    lisinopriL (PRINIVIL,ZESTRIL) 2.5 MG tablet 0.5 tablets, Daily    miscellaneous medical supply (C-TUB) Misc Diabetic shoes with full-length semi-rigid accommadative  Orthotics posting medial heel DX E11.9, [place]. Pes planus.    montelukast (SINGULAIR) 10 mg, Oral, Nightly    pantoprazole (PROTONIX) 40 mg, Oral, Daily    pravastatin (PRAVACHOL) 80 mg, Oral, Nightly     Objective:     Vitals:    05/22/25 0800   BP: 119/62   Pulse: 71   Weight: 106.9 kg (235 lb 10.8 oz)   Height: 5' 4.02" (1.626 m)   PainSc:   2      BP Readings from Last 4 Encounters:   05/22/25 119/62   05/06/25 122/61   04/03/25 98/61   02/27/25 106/62     Body mass index is 40.43 kg/m².         5/6/2025     2:37 PM 4/3/2025     4:22 PM 2/27/2024    10:00 AM 9/26/2023     1:04 PM 5/22/2023    10:41 AM 1/25/2023     2:52 PM 9/23/2022     8:41 AM   Multi-Dimensional Health Assessment   MHAQ Score 1.1 1 0.8 1.3 1.6 1.5 1.3   Psychologic Score 2.2 1.1 0 0 0 0 2.2   Pain Score 5 4 1.5 4 4.5 5 4   Fatigue Score 1.1  0 1.1 1.1 1.1 2.2   Global Health Score 3 1.5 0.5 3 4.5 3 4.5   RAPID3 Score 3.89 2.94 1.55 3.78 4.78 4.33 4.28     Monitoring Lab Results:  Lab Results   Component Value Date    WBC 6.05 04/03/2025    RBC 4.25 04/03/2025    HGB 12.8 04/03/2025    HCT 39.8 04/03/2025    MCV 94 04/03/2025    MCH 30.1 04/03/2025    MCHC 32.2 04/03/2025    RDW " "13.2 04/03/2025     04/03/2025      Lab Results   Component Value Date     04/03/2025    K 4.2 04/03/2025     04/03/2025    CO2 23 04/03/2025    GLU 95 04/03/2025    BUN 28 (H) 04/03/2025    CREATININE 1.3 04/03/2025    CALCIUM 9.8 04/03/2025    PROT 7.7 04/03/2025    ALBUMIN 4.1 04/03/2025    BILITOT 0.4 04/03/2025    ALKPHOS 58 04/03/2025    AST 24 04/03/2025    ALT 21 04/03/2025    ANIONGAP 12 04/03/2025    EGFRNORACEVR 45 (L) 04/03/2025     Lab Results   Component Value Date    SEDRATE 10 04/03/2025    CRP 2.0 04/03/2025      Lab Results   Component Value Date    RF <13.0 04/03/2025    CCPANTIBODIE 0.7 04/03/2025     Lab Results   Component Value Date    ANASCREEN Positive (A) 05/15/2023    ANATITER 1:160 05/15/2023    DSDNA Negative 1:10 05/15/2023    SMRNPAB 0.10 04/03/2025    SSAANTIBODY 0.09 04/03/2025    SSBANTIBODY 0.09 04/03/2025     No results found for: "HLABB27"    Infectious Disease Screening:  Lab Results   Component Value Date    HEPBSAG Non-Reactive 05/06/2025    HEPBCAB Non-Reactive 05/06/2025    HEPBSAB Non-Reactive 05/06/2025     Lab Results   Component Value Date    HEPCAB Non-Reactive 05/06/2025     Lab Results   Component Value Date    QUANTTBGDPL Negative 05/06/2025     Lab Results   Component Value Date    QUANTNILVALU 0.75346 05/06/2025    QUANTMITOGEN 9.22966 05/06/2025       DEXA: 2/12/2024  Narrative & Impression  EXAMINATION:  DXA BONE DENSITY AXIAL SKELETON 1 OR MORE SITES     CLINICAL HISTORY:  Encounter for screening for osteoporosis     TECHNIQUE:  DXA scanning was performed over the bilateral hip and lumbar spine.  Review of the images confirms satisfactory positioning and technique.     COMPARISON:  None     FINDINGS:  The L1 to L4 vertebral bone mineral density is equal to 1.139 g/cm squared with a T score of -0.4.     The total hip bone mineral density is equal to 0.971 g/cm squared with a T score of -0.3.     There is a 9.1% risk of a major osteoporotic " fracture and a 0.7% risk of hip fracture in the next 10 years (FRAX).     Impression: Normal bone mineral density    Assessment:     New start Humira for RA & PsA. Patient was referred to the Rheumatology pharmacist for injection education. Pharmacy sent biologic to patient's home and was brought in for injection. Counseled patient on mechanism of action, route of administration, dose and frequency of adalimumab (HUMIRA) previously. Discussed side effect profile, risks and benefits of Humira previously. Demonstrated proper injection technique, discussed step by step instructions. Explained importance of vaccines considering the increased risk of infections. Encouraged patient to practice proper hand hygiene considering increased risk of infection with biologics and to avoid raw seafood/live vaccines. Reviewed needed labs to ensure medication is being used safely.      Plan:      Encounter Diagnoses   Name Primary?    Seronegative rheumatoid arthritis Yes    PSA (psoriatic arthritis)     Medication monitoring encounter     Immunization counseling      1. Injection Training/Education  Provided patient with resources and educational material on Humira  Patient verbalized understanding instructions and was able to demonstrate proper technique using teach back method  Patient self-administered first dose in clinic.  Checked injection site after 20 mins and no reaction observed. Patient left office in stable condition.   Patient to observe injection site for the next few days and call the office if they notice any injection site issues or side effects   ND: 6/5/2025  Patient to make us aware if they ever experiences s/sx of an infection including fever, chills, URI symptoms or UTI symptoms.    2. Health Maintenance  Vaccines needed: Influenza, 2 new COVID doses, RSV, and TdaP    Patient agreed to complete vaccines   Advised pt to wait at least 7 days before getting any immunizations after receiving first dose of current  biologic     Follow-up scheduled on 12/1/2025 with MD Cheyanne Mcfarland, PharmD, BCPS  Rheumatology Clinical Pharmacist  [facility]

## 2025-06-23 ENCOUNTER — TELEPHONE (OUTPATIENT)
Dept: RHEUMATOLOGY | Facility: CLINIC | Age: 65
End: 2025-06-23
Payer: MEDICARE

## 2025-06-23 NOTE — TELEPHONE ENCOUNTER
Copied from CRM #[id number]. Topic: Medications - Medication Question  >> Jun 23, 2025 10:54 AM Edwardo wrote:  Type:  Needs Medical Advice    Who Called: pt    Pharmacy name and phone #:  By Mail from [facility] pharmacy      Would the patient rather a call back or a response via [facility]? call  Best Call Back Number: [phone]   Additional Information: PT said she is not taking adalimumab (HUMIRA,CF, PEN) 40 mg/0.4 mL PnKt. She has been feel sick from the med. Please advise.

## 2025-06-30 NOTE — TELEPHONE ENCOUNTER
Patient stated she stopped taking Humira because her 3rd dose made her sick with diarrhea, stomach pain and she felt sick. Patient's last dose taken on 6/19/25.  Offered patient a visit with Dr. Gonzalez to discuss alternative medications.  Patient will wait and speak to Dr. Wilson at her December

## 2025-07-11 ENCOUNTER — TELEPHONE (OUTPATIENT)
Dept: RHEUMATOLOGY | Facility: CLINIC | Age: 65
End: 2025-07-11
Payer: MEDICARE

## 2025-07-11 NOTE — TELEPHONE ENCOUNTER
Spoke with patient to see how she was feeling since stopping humira. Patient stated she is feeling much better. No more loose bowels stomach pain. Does not want to be seen sooner than December appointment. Will call office if pain gets too bad and feels like she needs to be seen sooner.

## 2025-08-11 ENCOUNTER — PATIENT MESSAGE (OUTPATIENT)
Dept: OTOLARYNGOLOGY | Facility: CLINIC | Age: 65
End: 2025-08-11
Payer: MEDICARE